# Patient Record
Sex: FEMALE | Race: WHITE | NOT HISPANIC OR LATINO | Employment: FULL TIME | URBAN - METROPOLITAN AREA
[De-identification: names, ages, dates, MRNs, and addresses within clinical notes are randomized per-mention and may not be internally consistent; named-entity substitution may affect disease eponyms.]

---

## 2020-05-11 ENCOUNTER — CLINICAL SUPPORT (OUTPATIENT)
Dept: FAMILY MEDICINE CLINIC | Facility: CLINIC | Age: 55
End: 2020-05-11
Payer: COMMERCIAL

## 2020-05-11 DIAGNOSIS — Z23 NEED FOR VACCINATION: Primary | ICD-10-CM

## 2020-05-11 PROCEDURE — 90746 HEPB VACCINE 3 DOSE ADULT IM: CPT | Performed by: FAMILY MEDICINE

## 2020-05-11 PROCEDURE — 90471 IMMUNIZATION ADMIN: CPT | Performed by: FAMILY MEDICINE

## 2020-07-10 DIAGNOSIS — J45.41 MODERATE PERSISTENT ASTHMA WITH ACUTE EXACERBATION: Primary | ICD-10-CM

## 2020-07-10 RX ORDER — FLUTICASONE FUROATE AND VILANTEROL TRIFENATATE 100; 25 UG/1; UG/1
POWDER RESPIRATORY (INHALATION)
Qty: 180 EACH | Refills: 2 | Status: SHIPPED | OUTPATIENT
Start: 2020-07-10 | End: 2021-06-23 | Stop reason: ALTCHOICE

## 2020-09-09 ENCOUNTER — CLINICAL SUPPORT (OUTPATIENT)
Dept: FAMILY MEDICINE CLINIC | Facility: CLINIC | Age: 55
End: 2020-09-09

## 2020-09-09 DIAGNOSIS — Z01.84 IMMUNITY TO HEPATITIS B VIRUS DEMONSTRATED BY SEROLOGIC TEST: Primary | ICD-10-CM

## 2020-09-09 PROCEDURE — 99211 OFF/OP EST MAY X REQ PHY/QHP: CPT

## 2020-09-09 PROCEDURE — 90746 HEPB VACCINE 3 DOSE ADULT IM: CPT

## 2020-09-09 PROCEDURE — 90471 IMMUNIZATION ADMIN: CPT

## 2020-09-14 DIAGNOSIS — R39.81 FUNCTIONAL URINARY INCONTINENCE: Primary | ICD-10-CM

## 2020-09-16 DIAGNOSIS — E11.9 TYPE 2 DIABETES MELLITUS WITHOUT COMPLICATION, WITHOUT LONG-TERM CURRENT USE OF INSULIN (HCC): Primary | ICD-10-CM

## 2020-09-16 DIAGNOSIS — R39.81 FUNCTIONAL URINARY INCONTINENCE: ICD-10-CM

## 2020-09-16 DIAGNOSIS — J45.41 MODERATE PERSISTENT ASTHMA WITH ACUTE EXACERBATION: ICD-10-CM

## 2020-09-16 DIAGNOSIS — F41.9 ANXIETY: ICD-10-CM

## 2020-09-16 RX ORDER — OXYBUTYNIN CHLORIDE 5 MG/1
5 TABLET, EXTENDED RELEASE ORAL DAILY
Qty: 90 TABLET | Refills: 2 | Status: SHIPPED | OUTPATIENT
Start: 2020-09-16 | End: 2021-03-25 | Stop reason: SDUPTHER

## 2020-09-16 RX ORDER — ESCITALOPRAM OXALATE 5 MG/1
5 TABLET ORAL DAILY
Qty: 30 TABLET | Refills: 5 | Status: SHIPPED | OUTPATIENT
Start: 2020-09-16 | End: 2020-10-01 | Stop reason: SDUPTHER

## 2020-09-16 RX ORDER — METFORMIN HYDROCHLORIDE EXTENDED-RELEASE TABLETS 1000 MG/1
1000 TABLET, FILM COATED, EXTENDED RELEASE ORAL
Qty: 90 TABLET | Refills: 2 | Status: SHIPPED | OUTPATIENT
Start: 2020-09-16 | End: 2020-09-16 | Stop reason: SDUPTHER

## 2020-09-16 RX ORDER — BUDESONIDE 0.5 MG/2ML
0.5 INHALANT ORAL 2 TIMES DAILY
Qty: 120 ML | Refills: 3 | Status: SHIPPED | OUTPATIENT
Start: 2020-09-16 | End: 2020-09-16 | Stop reason: SDUPTHER

## 2020-09-16 RX ORDER — BUDESONIDE 0.5 MG/2ML
0.5 INHALANT ORAL 2 TIMES DAILY
Qty: 120 ML | Refills: 3 | Status: SHIPPED | OUTPATIENT
Start: 2020-09-16 | End: 2021-08-11 | Stop reason: ALTCHOICE

## 2020-09-16 RX ORDER — METFORMIN HYDROCHLORIDE EXTENDED-RELEASE TABLETS 1000 MG/1
1000 TABLET, FILM COATED, EXTENDED RELEASE ORAL
Qty: 90 TABLET | Refills: 2 | Status: SHIPPED | OUTPATIENT
Start: 2020-09-16 | End: 2021-03-25 | Stop reason: SDUPTHER

## 2020-09-16 RX ORDER — OXYBUTYNIN CHLORIDE 5 MG/1
5 TABLET, EXTENDED RELEASE ORAL DAILY
Qty: 90 TABLET | Refills: 2 | Status: SHIPPED | OUTPATIENT
Start: 2020-09-16 | End: 2020-09-16 | Stop reason: SDUPTHER

## 2020-10-01 ENCOUNTER — OFFICE VISIT (OUTPATIENT)
Dept: FAMILY MEDICINE CLINIC | Facility: CLINIC | Age: 55
End: 2020-10-01

## 2020-10-01 VITALS
HEIGHT: 64 IN | SYSTOLIC BLOOD PRESSURE: 108 MMHG | OXYGEN SATURATION: 98 % | DIASTOLIC BLOOD PRESSURE: 74 MMHG | TEMPERATURE: 98.2 F | WEIGHT: 226 LBS | BODY MASS INDEX: 38.58 KG/M2 | RESPIRATION RATE: 16 BRPM | HEART RATE: 76 BPM

## 2020-10-01 DIAGNOSIS — F41.9 ANXIETY: ICD-10-CM

## 2020-10-01 DIAGNOSIS — E11.9 TYPE 2 DIABETES MELLITUS WITHOUT COMPLICATION, WITHOUT LONG-TERM CURRENT USE OF INSULIN (HCC): Primary | ICD-10-CM

## 2020-10-01 DIAGNOSIS — J45.41 MODERATE PERSISTENT ASTHMA WITH ACUTE EXACERBATION: ICD-10-CM

## 2020-10-01 DIAGNOSIS — R39.81 FUNCTIONAL URINARY INCONTINENCE: ICD-10-CM

## 2020-10-01 PROCEDURE — 99213 OFFICE O/P EST LOW 20 MIN: CPT | Performed by: FAMILY MEDICINE

## 2020-10-01 RX ORDER — ESCITALOPRAM OXALATE 10 MG/1
10 TABLET ORAL DAILY
Qty: 90 TABLET | Refills: 1 | Status: SHIPPED | OUTPATIENT
Start: 2020-10-01 | End: 2021-03-25 | Stop reason: SDUPTHER

## 2020-10-01 RX ORDER — IBUPROFEN 800 MG/1
TABLET ORAL EVERY 6 HOURS PRN
COMMUNITY

## 2020-10-01 RX ORDER — LANOLIN ALCOHOL/MO/W.PET/CERES
1 CREAM (GRAM) TOPICAL 3 TIMES DAILY
COMMUNITY
End: 2022-06-16

## 2020-10-01 RX ORDER — MELATONIN
1000 DAILY
COMMUNITY

## 2020-10-01 RX ORDER — FLUTICASONE PROPIONATE 50 MCG
2 SPRAY, SUSPENSION (ML) NASAL DAILY
COMMUNITY

## 2020-10-01 RX ORDER — LEVALBUTEROL INHALATION SOLUTION 0.31 MG/3ML
1 SOLUTION RESPIRATORY (INHALATION) EVERY 4 HOURS PRN
COMMUNITY

## 2020-10-01 RX ORDER — DIPHENOXYLATE HYDROCHLORIDE AND ATROPINE SULFATE 2.5; .025 MG/1; MG/1
1 TABLET ORAL DAILY
COMMUNITY
End: 2021-06-23 | Stop reason: ALTCHOICE

## 2020-10-01 RX ORDER — ACETAMINOPHEN 325 MG/1
650 TABLET ORAL EVERY 6 HOURS PRN
COMMUNITY

## 2020-10-01 RX ORDER — THIAMINE MONONITRATE (VIT B1) 100 MG
1000 TABLET ORAL DAILY
COMMUNITY
End: 2022-06-16

## 2020-10-13 DIAGNOSIS — Z79.4 TYPE 2 DIABETES MELLITUS WITHOUT COMPLICATION, WITH LONG-TERM CURRENT USE OF INSULIN (HCC): Primary | ICD-10-CM

## 2020-10-13 DIAGNOSIS — E11.9 TYPE 2 DIABETES MELLITUS WITHOUT COMPLICATION, WITH LONG-TERM CURRENT USE OF INSULIN (HCC): Primary | ICD-10-CM

## 2020-10-14 RX ORDER — LIRAGLUTIDE 6 MG/ML
INJECTION SUBCUTANEOUS
Qty: 27 PEN | Refills: 2 | Status: SHIPPED | OUTPATIENT
Start: 2020-10-14 | End: 2021-06-23 | Stop reason: ALTCHOICE

## 2021-02-24 ENCOUNTER — OFFICE VISIT (OUTPATIENT)
Dept: FAMILY MEDICINE CLINIC | Facility: CLINIC | Age: 56
End: 2021-02-24
Payer: COMMERCIAL

## 2021-02-24 VITALS
OXYGEN SATURATION: 97 % | TEMPERATURE: 97.2 F | SYSTOLIC BLOOD PRESSURE: 118 MMHG | HEART RATE: 65 BPM | WEIGHT: 217.8 LBS | DIASTOLIC BLOOD PRESSURE: 76 MMHG | RESPIRATION RATE: 14 BRPM | BODY MASS INDEX: 37.18 KG/M2 | HEIGHT: 64 IN

## 2021-02-24 DIAGNOSIS — E55.9 VITAMIN D DEFICIENCY: ICD-10-CM

## 2021-02-24 DIAGNOSIS — E11.9 TYPE 2 DIABETES MELLITUS WITHOUT COMPLICATION, WITHOUT LONG-TERM CURRENT USE OF INSULIN (HCC): Primary | ICD-10-CM

## 2021-02-24 DIAGNOSIS — E66.09 CLASS 2 OBESITY DUE TO EXCESS CALORIES WITHOUT SERIOUS COMORBIDITY WITH BODY MASS INDEX (BMI) OF 37.0 TO 37.9 IN ADULT: ICD-10-CM

## 2021-02-24 DIAGNOSIS — J45.41 MODERATE PERSISTENT ASTHMA WITH ACUTE EXACERBATION: ICD-10-CM

## 2021-02-24 DIAGNOSIS — E53.8 CYANOCOBALAMIN DEFICIENCY: ICD-10-CM

## 2021-02-24 DIAGNOSIS — R63.8 INCREASED BMI: ICD-10-CM

## 2021-02-24 DIAGNOSIS — R39.81 FUNCTIONAL URINARY INCONTINENCE: ICD-10-CM

## 2021-02-24 DIAGNOSIS — Z11.4 SCREENING FOR HIV (HUMAN IMMUNODEFICIENCY VIRUS): ICD-10-CM

## 2021-02-24 DIAGNOSIS — F41.9 ANXIETY: ICD-10-CM

## 2021-02-24 DIAGNOSIS — Z12.31 VISIT FOR SCREENING MAMMOGRAM: ICD-10-CM

## 2021-02-24 DIAGNOSIS — Z11.59 NEED FOR HEPATITIS C SCREENING TEST: ICD-10-CM

## 2021-02-24 PROBLEM — E66.812 CLASS 2 OBESITY DUE TO EXCESS CALORIES IN ADULT: Status: ACTIVE | Noted: 2021-02-24

## 2021-02-24 PROCEDURE — 99214 OFFICE O/P EST MOD 30 MIN: CPT | Performed by: FAMILY MEDICINE

## 2021-02-24 PROCEDURE — 3008F BODY MASS INDEX DOCD: CPT | Performed by: FAMILY MEDICINE

## 2021-02-24 PROCEDURE — 1036F TOBACCO NON-USER: CPT | Performed by: FAMILY MEDICINE

## 2021-02-24 RX ORDER — EPINEPHRINE 0.15 MG/.3ML
0.15 INJECTION INTRAMUSCULAR ONCE
COMMUNITY

## 2021-02-24 RX ORDER — LORATADINE 10 MG/1
CAPSULE, LIQUID FILLED ORAL
COMMUNITY

## 2021-02-24 NOTE — PROGRESS NOTES
BMI Counseling: Body mass index is 37 68 kg/m²  The BMI is above normal  Nutrition recommendations include decreasing portion sizes, encouraging healthy choices of fruits and vegetables, limiting drinks that contain sugar and reducing intake of cholesterol  Exercise recommendations include exercising 3-5 times per week  No pharmacotherapy was ordered  Assessment/Plan:  Order for mammogram   Order for blood work  Will check hemoglobin A1c and cholesterol since she was on keto diet  She had COVID vaccine done already  Will see her back in 3 months for Pap  Will plan to colonoscopy  Will monitor her left knee pain  Advised her knee brace  She can not do pneumonia vaccine due to reaction  I have spent 25 minutes with Patient  today in which greater than 50% of this time was spent in counseling/coordination of care regarding Risks and benefits of tx options           Problem List Items Addressed This Visit        Endocrine    Type 2 diabetes mellitus without complication, without long-term current use of insulin ([facility] [address] ) - Primary    Relevant Orders    CBC and differential    Hemoglobin A1C    Lipid Panel with Direct LDL reflex    Microalbumin,Urine    TSH, 3rd generation with Free T4 reflex    Uric acid       Respiratory    Moderate persistent asthma with acute exacerbation       Other    Functional urinary incontinence    Relevant Orders    UA w Reflex to Microscopic w Reflex to Culture    Anxiety    Class 2 obesity due to excess calories in adult      Other Visit Diagnoses     Vitamin D deficiency        Relevant Orders    Vitamin D 25 hydroxy    Cyanocobalamin deficiency        Relevant Orders    Vitamin B12    Visit for screening mammogram        Relevant Orders    Mammo screening bilateral w cad    Need for hepatitis C screening test        Relevant Orders    Hepatitis C Antibody ([facility])    Screening for HIV (human immunodeficiency virus)        Relevant Orders    HIV 1/2 Antigen/Antibody (4th Generation) w Reflex [facility]    Increased BMI                Subjective:      Patient ID: Garrett Flores is a 54 y o  female  63-year-old female follow-up type 2 diabetes asthma urinary incontinent  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   She working Friday Saturday Sunday and had the rest of the days off  She ran out Victoza and back on metformin daily  She is on Lexapro 10 mg daily from she has asthma has not needing Breo as much  She has not been able to exercise due to the weather  Her left knee started bothering her again only when she sleeps  Voltaren gel seem to help  She is due for complete physical in in March with Pap  The following portions of the patient's history were reviewed and updated as appropriate:   Past Medical History:  She has a past medical history of Anxiety (9/16/2020), Arthritis, Asthma, Class 2 obesity due to excess calories in adult (2/24/2021), Diabetes mellitus ([address] ), Functional urinary incontinence (9/14/2020), Hypertension, Moderate persistent asthma with acute exacerbation (9/16/2020), Obesity, Sleep apnea, Type 2 diabetes mellitus without complication, without long-term current use of insulin ([address] ) (9/16/2020), and Vitamin D deficiency  ,  _______________________________________________________________________  Medical Problems:  does not have any pertinent problems on file ,  _______________________________________________________________________  Past Surgical History:   has a past surgical history that includes Tonsillectomy; Colonoscopy (01/01/2015); Hysteroscopy (01/01/2008); Other surgical history (1998); Cyst Removal; and Mammo (historical) (01/04/2016)  ,  _______________________________________________________________________  Family History:  family history includes Arthritis in her brother, father, and mother; Cancer in her brother; Depression in her brother, father, and mother; Diabetes in her mother; Heart attack in her mother; Hyperlipidemia in her mother; Hypertension in her father and mother; Rheum arthritis in her father and mother ,  _______________________________________________________________________  Social History:   reports that she has never smoked  She has never used smokeless tobacco  She reports current alcohol use  She reports that she does not use drugs  ,  _______________________________________________________________________  Allergies:  is allergic to pneumococcal vaccines and other     _______________________________________________________________________  Current Outpatient Medications   Medication Sig Dispense Refill    ASPIRIN 81 PO Take 81 mg by mouth daily      b complex vitamins tablet Take 1 tablet by mouth daily      cholecalciferol (VITAMIN D3) 1,000 units tablet Take 2,000 Units by mouth daily      diphenhydrAMINE HCl (BENADRYL ALLERGY PO) Take by mouth      EPINEPHrine (EPIPEN JR) 0 15 mg/0 3 mL SOAJ Inject 0 15 mg into a muscle once      escitalopram (LEXAPRO) 10 mg tablet Take 1 tablet (10 mg total) by mouth daily 90 tablet 1    fluticasone (FLONASE) 50 mcg/act nasal spray 1 spray into each nostril daily      glucosamine-chondroitin 500-400 MG tablet Take 1 tablet by mouth 3 (three) times a day      ibuprofen (MOTRIN) 800 mg tablet Take by mouth every 6 (six) hours as needed for mild pain      Loratadine 10 MG CAPS Take by mouth      metFORMIN (FORTAMET) 1000 MG (OSM) 24 hr tablet Take 1 tablet (1,000 mg total) by mouth daily with breakfast 90 tablet 2    Omega-3 Fatty Acids (Fish Oil) 1200 MG CPDR Take 1,500 mg by mouth      oxybutynin (DITROPAN-XL) 5 mg 24 hr tablet Take 1 tablet (5 mg total) by mouth daily 90 tablet 2    acetaminophen (Tylenol) 325 mg tablet Take 650 mg by mouth every 6 (six) hours as needed for mild pain      BREO ELLIPTA 100-25 MCG/INH inhaler USE 1 INHALATION ORALLY    DAILY (Patient not taking: Reported on 10/1/2020) 180 each 2    budesonide (PULMICORT) 0 5 mg/2 mL nebulizer solution Take 1 vial (0 5 mg total) by nebulization 2 (two) times a day Rinse mouth after use  (Patient not taking: Reported on 2/24/2021) 120 mL 3    levalbuterol (XOPENEX) 0 31 mg/3 mL nebulizer solution Take 1 ampule by nebulization every 4 (four) hours as needed for wheezing      multivitamin (THERAGRAN) TABS Take 1 tablet by mouth daily      thiamine (VITAMIN B1) 100 mg tablet Take 1,000 mg by mouth daily      Victoza injection INJECT 1 8 MG ONCE DAILY FOR DIABETES= 90 DAYS SUPPLY (Patient not taking: Reported on 2/24/2021) 27 pen 2     No current facility-administered medications for this visit       _______________________________________________________________________  Review of Systems   Constitutional: Negative for activity change, appetite change, fatigue and unexpected weight change  HENT: Negative for ear pain, sore throat, trouble swallowing and voice change  Eyes: Negative for photophobia and visual disturbance  Respiratory: Negative for cough, chest tightness, shortness of breath and wheezing  Cardiovascular: Negative for chest pain, palpitations and leg swelling  Gastrointestinal: Negative for abdominal pain, constipation, diarrhea, nausea, rectal pain and vomiting  Endocrine: Negative for cold intolerance, polydipsia and polyuria  Genitourinary: Negative for difficulty urinating, dysuria, flank pain, menstrual problem and pelvic pain  Musculoskeletal: Negative for arthralgias, joint swelling and myalgias  Skin: Negative for color change and rash  Allergic/Immunologic: Negative for environmental allergies and immunocompromised state  Neurological: Negative for dizziness, weakness, numbness and headaches  Hematological: Negative for adenopathy  Does not bruise/bleed easily  Psychiatric/Behavioral: Negative for decreased concentration, dysphoric mood, self-injury, sleep disturbance and suicidal ideas  The patient is not nervous/anxious  Objective:  Vitals:    02/24/21 1207   BP: 118/76   BP Location: Left arm   Patient Position: Sitting   Cuff Size: Large   Pulse: 65   Resp: 14   Temp: (!) 97 2 °F (36 2 °C)   TempSrc: Temporal   SpO2: 97%   Weight: 98 8 kg (217 lb 12 8 oz)   Height: 5' 3 75" (1 619 m)     Body mass index is 37 68 kg/m²  Physical Exam  Vitals signs and nursing note reviewed  Constitutional:       Appearance: Normal appearance  She is obese  HENT:      Head: Normocephalic  Pulmonary:      Effort: Pulmonary effort is normal    Neurological:      Mental Status: She is alert and oriented to person, place, and time  Mental status is at baseline  Psychiatric:         Mood and Affect: Mood normal          Behavior: Behavior normal          Thought Content:  Thought content normal          Judgment: Judgment normal

## 2021-04-08 DIAGNOSIS — E11.9 TYPE 2 DIABETES MELLITUS WITHOUT COMPLICATION, WITHOUT LONG-TERM CURRENT USE OF INSULIN (HCC): Primary | ICD-10-CM

## 2021-04-08 RX ORDER — METFORMIN HYDROCHLORIDE 500 MG/1
1000 TABLET, EXTENDED RELEASE ORAL
Qty: 180 TABLET | Refills: 2 | Status: SHIPPED | OUTPATIENT
Start: 2021-04-08 | End: 2022-01-04

## 2021-06-15 ENCOUNTER — TELEPHONE (OUTPATIENT)
Dept: FAMILY MEDICINE CLINIC | Facility: CLINIC | Age: 56
End: 2021-06-15

## 2021-06-15 NOTE — TELEPHONE ENCOUNTER
Patient in [address] forgot c pap mask  ---she needs and order for c pap supplies faxed to  [phone]  Thank you

## 2021-06-17 ENCOUNTER — RA CDI HCC (OUTPATIENT)
Dept: OTHER | Facility: HOSPITAL | Age: 56
End: 2021-06-17

## 2021-06-17 NOTE — PROGRESS NOTES
[facility] [address]  coding opportunities             Chart reviewed, (number of) suggestions sent to provider: 1               Number of suggestions NOT actually used: 1     Patients insurance company: [employer] (Medicare Advantage and Commercial)     Visit status: Patient arrived for their scheduled appointment     Provider never responded to [employer]  coding request     [facility] [employer] [address]  coding opportunities             Chart reviewed, (number of) suggestions sent to provider: 1                  Patients insurance company: [employer] [address] [place] [address] (Medicare Advantage and Commercial)           E66 01: Morbid (severe) obesity due to excess calories ([facility] ) - please review if this is correct and assess and document if applicable

## 2021-06-22 ENCOUNTER — APPOINTMENT (OUTPATIENT)
Dept: LAB | Facility: CLINIC | Age: 56
End: 2021-06-22
Payer: COMMERCIAL

## 2021-06-22 DIAGNOSIS — E55.9 VITAMIN D DEFICIENCY: ICD-10-CM

## 2021-06-22 DIAGNOSIS — Z11.4 SCREENING FOR HIV (HUMAN IMMUNODEFICIENCY VIRUS): ICD-10-CM

## 2021-06-22 DIAGNOSIS — E53.8 CYANOCOBALAMIN DEFICIENCY: ICD-10-CM

## 2021-06-22 DIAGNOSIS — Z11.59 NEED FOR HEPATITIS C SCREENING TEST: ICD-10-CM

## 2021-06-22 DIAGNOSIS — E11.9 TYPE 2 DIABETES MELLITUS WITHOUT COMPLICATION, WITHOUT LONG-TERM CURRENT USE OF INSULIN (HCC): ICD-10-CM

## 2021-06-22 LAB
25(OH)D3 SERPL-MCNC: 30.2 NG/ML (ref 30–100)
BASOPHILS # BLD AUTO: 0.07 THOUSANDS/ΜL (ref 0–0.1)
BASOPHILS NFR BLD AUTO: 1 % (ref 0–1)
BILIRUB UR QL STRIP: NEGATIVE
CHOLEST SERPL-MCNC: 206 MG/DL (ref 50–200)
CLARITY UR: CLEAR
COLOR UR: YELLOW
EOSINOPHIL # BLD AUTO: 0.51 THOUSAND/ΜL (ref 0–0.61)
EOSINOPHIL NFR BLD AUTO: 8 % (ref 0–6)
ERYTHROCYTE [DISTWIDTH] IN BLOOD BY AUTOMATED COUNT: 12 % (ref 11.6–15.1)
EST. AVERAGE GLUCOSE BLD GHB EST-MCNC: 114 MG/DL
GLUCOSE UR STRIP-MCNC: NEGATIVE MG/DL
HBA1C MFR BLD: 5.6 %
HCT VFR BLD AUTO: 38.2 % (ref 34.8–46.1)
HCV AB SER QL: NORMAL
HDLC SERPL-MCNC: 54 MG/DL
HGB BLD-MCNC: 12.9 G/DL (ref 11.5–15.4)
HGB UR QL STRIP.AUTO: NEGATIVE
IMM GRANULOCYTES # BLD AUTO: 0.03 THOUSAND/UL (ref 0–0.2)
IMM GRANULOCYTES NFR BLD AUTO: 1 % (ref 0–2)
KETONES UR STRIP-MCNC: NEGATIVE MG/DL
LDLC SERPL CALC-MCNC: 121 MG/DL (ref 0–100)
LEUKOCYTE ESTERASE UR QL STRIP: NEGATIVE
LYMPHOCYTES # BLD AUTO: 1.82 THOUSANDS/ΜL (ref 0.6–4.47)
LYMPHOCYTES NFR BLD AUTO: 28 % (ref 14–44)
MCH RBC QN AUTO: 31.8 PG (ref 26.8–34.3)
MCHC RBC AUTO-ENTMCNC: 33.8 G/DL (ref 31.4–37.4)
MCV RBC AUTO: 94 FL (ref 82–98)
MONOCYTES # BLD AUTO: 0.42 THOUSAND/ΜL (ref 0.17–1.22)
MONOCYTES NFR BLD AUTO: 6 % (ref 4–12)
NEUTROPHILS # BLD AUTO: 3.76 THOUSANDS/ΜL (ref 1.85–7.62)
NEUTS SEG NFR BLD AUTO: 56 % (ref 43–75)
NITRITE UR QL STRIP: NEGATIVE
NRBC BLD AUTO-RTO: 0 /100 WBCS
PH UR STRIP.AUTO: 6 [PH]
PLATELET # BLD AUTO: 260 THOUSANDS/UL (ref 149–390)
PMV BLD AUTO: 11.3 FL (ref 8.9–12.7)
PROT UR STRIP-MCNC: NEGATIVE MG/DL
RBC # BLD AUTO: 4.06 MILLION/UL (ref 3.81–5.12)
SP GR UR STRIP.AUTO: 1.02 (ref 1–1.03)
TRIGL SERPL-MCNC: 153 MG/DL
TSH SERPL DL<=0.05 MIU/L-ACNC: 2.84 UIU/ML (ref 0.36–3.74)
URATE SERPL-MCNC: 6.2 MG/DL (ref 2–6.8)
UROBILINOGEN UR QL STRIP.AUTO: 0.2 E.U./DL
VIT B12 SERPL-MCNC: 770 PG/ML (ref 100–900)
WBC # BLD AUTO: 6.61 THOUSAND/UL (ref 4.31–10.16)

## 2021-06-22 PROCEDURE — 82306 VITAMIN D 25 HYDROXY: CPT

## 2021-06-22 PROCEDURE — 36415 COLL VENOUS BLD VENIPUNCTURE: CPT

## 2021-06-22 PROCEDURE — 82607 VITAMIN B-12: CPT

## 2021-06-22 PROCEDURE — 3044F HG A1C LEVEL LT 7.0%: CPT | Performed by: FAMILY MEDICINE

## 2021-06-22 PROCEDURE — 84443 ASSAY THYROID STIM HORMONE: CPT

## 2021-06-22 PROCEDURE — 87389 HIV-1 AG W/HIV-1&-2 AB AG IA: CPT

## 2021-06-22 PROCEDURE — 86803 HEPATITIS C AB TEST: CPT

## 2021-06-22 PROCEDURE — 84550 ASSAY OF BLOOD/URIC ACID: CPT

## 2021-06-22 PROCEDURE — 83036 HEMOGLOBIN GLYCOSYLATED A1C: CPT

## 2021-06-22 PROCEDURE — 85025 COMPLETE CBC W/AUTO DIFF WBC: CPT

## 2021-06-22 PROCEDURE — 80061 LIPID PANEL: CPT

## 2021-06-22 PROCEDURE — 81003 URINALYSIS AUTO W/O SCOPE: CPT | Performed by: FAMILY MEDICINE

## 2021-06-23 ENCOUNTER — OFFICE VISIT (OUTPATIENT)
Dept: FAMILY MEDICINE CLINIC | Facility: CLINIC | Age: 56
End: 2021-06-23
Payer: COMMERCIAL

## 2021-06-23 VITALS
HEART RATE: 78 BPM | BODY MASS INDEX: 37.66 KG/M2 | SYSTOLIC BLOOD PRESSURE: 124 MMHG | WEIGHT: 220.6 LBS | HEIGHT: 64 IN | OXYGEN SATURATION: 98 % | DIASTOLIC BLOOD PRESSURE: 84 MMHG | TEMPERATURE: 97.7 F

## 2021-06-23 DIAGNOSIS — J45.41 MODERATE PERSISTENT ASTHMA WITH ACUTE EXACERBATION: ICD-10-CM

## 2021-06-23 DIAGNOSIS — Z00.00 ANNUAL PHYSICAL EXAM: ICD-10-CM

## 2021-06-23 DIAGNOSIS — Z12.11 SCREENING FOR COLORECTAL CANCER: ICD-10-CM

## 2021-06-23 DIAGNOSIS — F41.9 ANXIETY: ICD-10-CM

## 2021-06-23 DIAGNOSIS — E11.9 TYPE 2 DIABETES MELLITUS WITHOUT COMPLICATION, WITHOUT LONG-TERM CURRENT USE OF INSULIN (HCC): Primary | ICD-10-CM

## 2021-06-23 DIAGNOSIS — E66.09 CLASS 2 OBESITY DUE TO EXCESS CALORIES WITHOUT SERIOUS COMORBIDITY WITH BODY MASS INDEX (BMI) OF 37.0 TO 37.9 IN ADULT: ICD-10-CM

## 2021-06-23 DIAGNOSIS — Z12.4 SCREENING FOR CERVICAL CANCER: ICD-10-CM

## 2021-06-23 DIAGNOSIS — Z12.12 SCREENING FOR COLORECTAL CANCER: ICD-10-CM

## 2021-06-23 DIAGNOSIS — Z12.11 ENCOUNTER FOR SCREENING COLONOSCOPY: ICD-10-CM

## 2021-06-23 DIAGNOSIS — R39.81 FUNCTIONAL URINARY INCONTINENCE: ICD-10-CM

## 2021-06-23 DIAGNOSIS — R63.8 INCREASED BMI: ICD-10-CM

## 2021-06-23 LAB — HIV 1+2 AB+HIV1 P24 AG SERPL QL IA: NORMAL

## 2021-06-23 PROCEDURE — 3008F BODY MASS INDEX DOCD: CPT | Performed by: FAMILY MEDICINE

## 2021-06-23 PROCEDURE — 87624 HPV HI-RISK TYP POOLED RSLT: CPT | Performed by: FAMILY MEDICINE

## 2021-06-23 PROCEDURE — 1036F TOBACCO NON-USER: CPT | Performed by: FAMILY MEDICINE

## 2021-06-23 PROCEDURE — G0145 SCR C/V CYTO,THINLAYER,RESCR: HCPCS | Performed by: FAMILY MEDICINE

## 2021-06-23 PROCEDURE — 99396 PREV VISIT EST AGE 40-64: CPT | Performed by: FAMILY MEDICINE

## 2021-06-23 PROCEDURE — 3725F SCREEN DEPRESSION PERFORMED: CPT | Performed by: FAMILY MEDICINE

## 2021-06-23 RX ORDER — ESCITALOPRAM OXALATE 10 MG/1
10 TABLET ORAL DAILY
Qty: 90 TABLET | Refills: 1 | Status: SHIPPED | OUTPATIENT
Start: 2021-06-23 | End: 2022-02-16 | Stop reason: SDUPTHER

## 2021-06-23 NOTE — PROGRESS NOTES
ADULT ANNUAL PHYSICAL  [facility] [place]    NAME: Radha Reyes  AGE: 64 y o  SEX: female  : 1965     DATE: 2021     Assessment and Plan:    discussed blood test results with patient  She has type 2 by diabetes on metformin 1000 mg daily  Hemoglobin A1c is 5 6   triglycerides 153 elevated  She is on fish oil 1500 milligram daily  Vitamin-D is low 30 she is on vitamin-D 2000 units once a day  B12 770 normal   Uric acid of 6 2  She is on Ditropan for urinary incontinent  Urine studies negative for infection  Mood is stable on Lexapro 10 milligram daily  She is in a great he new relationship  She is sexually active with no protection  Hep C was negative  HIV is pending  Last colonoscopy 5 years ago hyperplastic polyp  Will order for Cologuard study  Wait for Pap smear with HPV testing  Recommend to monitor her blood sugar her blood pressure and cholesterol  Supplement vitamin-D increase fish oil  See her back in 3 months follow-up her weight blood pressure  Problem List Items Addressed This Visit        Endocrine    Type 2 diabetes mellitus without complication, without long-term current use of insulin ([facility]) - Primary       Respiratory    Moderate persistent asthma with acute exacerbation       Other    Functional urinary incontinence    Anxiety    Relevant Medications    escitalopram (LEXAPRO) 10 mg tablet    Class 2 obesity due to excess calories in adult      Other Visit Diagnoses     Encounter for screening colonoscopy        Relevant Orders    Cologuard    Increased BMI        Annual physical exam        Screening for cervical cancer        Relevant Orders    Liquid-based pap, screening ([facility])    HPV High Risk ([facility])    Screening for colorectal cancer        Relevant Orders    Cologuard          Immunizations and preventive care screenings were discussed with patient today   Appropriate education was printed on patient's after visit summary  Counseling:  · Sexual health: discussed sexually transmitted diseases, partner selection, use of condoms, avoidance of unintended pregnancy, and contraceptive alternatives  Return in about 3 months (around 9/23/2021) for 30 min fu bp  Chief Complaint:     Chief Complaint   Patient presents with    Physical Exam     Review lab results     Gynecologic Exam      History of Present Illness:     Adult Annual Physical   Patient here for a comprehensive physical exam  The patient reports no problems  Diet and Physical Activity  · Diet/Nutrition: well balanced diet  · Exercise: walking  Depression Screening  PHQ-9 Depression Screening    PHQ-9:   Frequency of the following problems over the past two weeks:      Little interest or pleasure in doing things: 0 - not at all  Feeling down, depressed, or hopeless: 0 - not at all  PHQ-2 Score: 0       General Health  · Sleep: sleeps well  · Hearing: normal - bilateral   · Vision: no vision problems  · Dental: regular dental visits  /GYN Health  · Patient is: postmenopausal  · Last menstrual period: 5 y ago  · Contraceptive method: none  Review of Systems:     Review of Systems   Constitutional: Negative for activity change, appetite change, fatigue and unexpected weight change  HENT: Negative for ear pain, sore throat, trouble swallowing and voice change  Eyes: Negative for photophobia and visual disturbance  Respiratory: Negative for cough, chest tightness, shortness of breath and wheezing  Cardiovascular: Negative for chest pain, palpitations and leg swelling  Gastrointestinal: Negative for abdominal pain, constipation, diarrhea, nausea, rectal pain and vomiting  Endocrine: Negative for cold intolerance, polydipsia and polyuria  Genitourinary: Negative for difficulty urinating, dysuria, flank pain, menstrual problem and pelvic pain     Musculoskeletal: Negative for arthralgias, joint swelling and myalgias  Skin: Negative for color change and rash  Allergic/Immunologic: Negative for environmental allergies and immunocompromised state  Neurological: Negative for dizziness, weakness, numbness and headaches  Hematological: Negative for adenopathy  Does not bruise/bleed easily  Psychiatric/Behavioral: Negative for decreased concentration, dysphoric mood, self-injury, sleep disturbance and suicidal ideas  The patient is not nervous/anxious  Past Medical History:     Past Medical History:   Diagnosis Date    Anxiety 9/16/2020    Arthritis     Asthma     Class 2 obesity due to excess calories in adult 2/24/2021    Diabetes mellitus ([facility] [address] )     Functional urinary incontinence 9/14/2020    Hypertension     Moderate persistent asthma with acute exacerbation 9/16/2020    Obesity     Sleep apnea     Type 2 diabetes mellitus without complication, without long-term current use of insulin ([facility] [address] ) 9/16/2020    Vitamin D deficiency       Past Surgical History:     Past Surgical History:   Procedure Laterality Date    COLONOSCOPY  01/01/2015    tiny polyp=next 2020    CYST REMOVAL      thumb cyst    HYSTEROSCOPY  01/01/2008    with biopsy    MAMMO (HISTORICAL)  01/04/2016    normal=[facility]    OTHER SURGICAL HISTORY  1998    tongue papiloma    TONSILLECTOMY        Social History:     Social History     Socioeconomic History    Marital status:      Spouse name: None    Number of children: None    Years of education: None    Highest education level: None   Occupational History    None   Tobacco Use    Smoking status: Never Smoker    Smokeless tobacco: Never Used   Vaping Use    Vaping Use: Never used   Substance and Sexual Activity    Alcohol use: Yes     Alcohol/week: 0 0 standard drinks     Comment: Three to four per month hard cider      Drug use: Never    Sexual activity: Not Currently     Partners: Male     Birth control/protection: Post-menopausal Other Topics Concern    None   Social History Narrative    · Do you currently or have you served in the [employer]:   No      · Were you activated, into active duty, as a member of the [employer] or as a Reservist:   No      · Marital status:         · Exercise level: Moderate      · Diet:   Regular      · General stress level:   Low       · Caffeine intake:   Occasional        · Guns present in home:   No      · Seat belts used routinely:   Yes      · Sunscreen used routinely:   Yes      · Advance directive:   No         Per bianca      Social Determinants of Health     Financial Resource Strain:     Difficulty of Paying Living Expenses:    Food Insecurity:     Worried About Running Out of Food in the Last Year:     [address] in the Last Year:    Transportation Needs:     Lack of Transportation (Medical):      Lack of Transportation (Non-Medical):    Physical Activity:     Days of Exercise per Week:     Minutes of Exercise per Session:    Stress:     Feeling of Stress :    Social Connections:     Frequency of Communication with Friends and Family:     Frequency of Social Gatherings with Friends and Family:     Attends [employer] Services:     Active Member of Clubs or Organizations:     Attends Club or Organization Meetings:     Marital Status:    Intimate Partner Violence:     Fear of Current or Ex-Partner:     Emotionally Abused:     Physically Abused:     Sexually Abused:       Family History:     Family History   Problem Relation Age of Onset    Diabetes Mother     Hypertension Mother     Depression Mother     Arthritis Mother     Hyperlipidemia Mother     Heart attack Mother     Rheum arthritis Mother     Hypertension Father     Depression Father     Arthritis Father     Rheum arthritis Father     ADD / ADHD Father     Anxiety disorder Father     Depression Brother     Arthritis Brother     Cancer Brother         malignant neoplasm of lip, oral cavity, and pharynx    Drug abuse Brother     Substance Abuse Brother     Bipolar disorder Brother     Suicide Attempts Brother       Current Medications:     Current Outpatient Medications   Medication Sig Dispense Refill    acetaminophen (Tylenol) 325 mg tablet Take 650 mg by mouth every 6 (six) hours as needed for mild pain      ASPIRIN 81 PO Take 81 mg by mouth daily      b complex vitamins tablet Take 1 tablet by mouth daily      cholecalciferol (VITAMIN D3) 1,000 units tablet Take 2,000 Units by mouth daily      diphenhydrAMINE HCl (BENADRYL ALLERGY PO) Take by mouth      EPINEPHrine (EPIPEN JR) 0 15 mg/0 3 mL SOAJ Inject 0 15 mg into a muscle once      escitalopram (LEXAPRO) 10 mg tablet Take 1 tablet (10 mg total) by mouth daily 90 tablet 1    fluticasone (FLONASE) 50 mcg/act nasal spray 1 spray into each nostril daily      glucosamine-chondroitin 500-400 MG tablet Take 1 tablet by mouth 3 (three) times a day      ibuprofen (MOTRIN) 800 mg tablet Take by mouth every 6 (six) hours as needed for mild pain      levalbuterol (XOPENEX) 0 31 mg/3 mL nebulizer solution Take 1 ampule by nebulization every 4 (four) hours as needed for wheezing      Loratadine 10 MG CAPS Take by mouth      metFORMIN (GLUCOPHAGE-XR) 500 mg 24 hr tablet Take 2 tablets (1,000 mg total) by mouth daily with breakfast 180 tablet 2    Omega-3 Fatty Acids (Fish Oil) 1200 MG CPDR Take 1,500 mg by mouth      oxybutynin (DITROPAN-XL) 5 mg 24 hr tablet Take 1 tablet (5 mg total) by mouth daily 90 tablet 2    thiamine (VITAMIN B1) 100 mg tablet Take 1,000 mg by mouth daily      budesonide (PULMICORT) 0 5 mg/2 mL nebulizer solution Take 1 vial (0 5 mg total) by nebulization 2 (two) times a day Rinse mouth after use  (Patient not taking: Reported on 2/24/2021) 120 mL 3     No current facility-administered medications for this visit  Allergies:      Allergies   Allergen Reactions    Bee Venom Anaphylaxis, Hives, Shortness Of Breath, Throat Swelling and Wheezing    Pneumococcal Vaccines     Other Rash and Sneezing     Beech, birch (rash), maple tree pollen  Dog and cat danger  Physical Exam:     /84 (BP Location: Left arm, Patient Position: Sitting, Cuff Size: Standard)   Pulse 78   Temp 97 7 °F (36 5 °C) (Tympanic)   Ht 5' 3 75" (1 619 m)   Wt 100 kg (220 lb 9 6 oz)   SpO2 98%   BMI 38 16 kg/m²     Physical Exam  Vitals and nursing note reviewed  Constitutional:       General: She is not in acute distress  Appearance: She is well-developed  HENT:      Head: Normocephalic and atraumatic  Right Ear: Tympanic membrane, ear canal and external ear normal       Left Ear: Tympanic membrane, ear canal and external ear normal       Nose: Nose normal       Mouth/Throat:      Mouth: Mucous membranes are dry  Pharynx: Oropharynx is clear  Eyes:      Conjunctiva/sclera: Conjunctivae normal    Cardiovascular:      Rate and Rhythm: Normal rate and regular rhythm  Heart sounds: No murmur heard  Pulmonary:      Effort: Pulmonary effort is normal  No respiratory distress  Breath sounds: Normal breath sounds  Abdominal:      General: Abdomen is flat  Bowel sounds are normal       Palpations: Abdomen is soft  Tenderness: There is no abdominal tenderness  Hernia: There is no hernia in the left inguinal area or right inguinal area  Genitourinary:     General: Normal vulva  Exam position: Supine  Pubic Area: No rash  Kobe stage (genital): 5  Labia:         Right: No rash, tenderness, lesion or injury  Left: No rash, tenderness, lesion or injury  Urethra: No prolapse, urethral pain, urethral swelling or urethral lesion  Vagina: No vaginal discharge  Rectum: Normal       Comments: Pap done of cervix  Pt refused guaic testing/rectal exam  Musculoskeletal:         General: Normal range of motion        Cervical back: Normal range of motion and neck supple  Lymphadenopathy:      Lower Body: No right inguinal adenopathy  No left inguinal adenopathy  Skin:     General: Skin is warm and dry  Capillary Refill: Capillary refill takes less than 2 seconds  Neurological:      General: No focal deficit present  Mental Status: She is alert and oriented to person, place, and time  Mental status is at baseline  Psychiatric:         Mood and Affect: Mood normal          Behavior: Behavior normal          Thought Content:  Thought content normal          Judgment: Judgment normal           Emi Cazares MD  [facility]

## 2021-06-23 NOTE — PATIENT INSTRUCTIONS

## 2021-06-25 LAB
HPV HR 12 DNA CVX QL NAA+PROBE: NEGATIVE
HPV16 DNA CVX QL NAA+PROBE: NEGATIVE
HPV18 DNA CVX QL NAA+PROBE: NEGATIVE

## 2021-07-01 ENCOUNTER — TELEPHONE (OUTPATIENT)
Dept: FAMILY MEDICINE CLINIC | Facility: CLINIC | Age: 56
End: 2021-07-01

## 2021-07-01 LAB
LAB AP GYN PRIMARY INTERPRETATION: NORMAL
Lab: NORMAL

## 2021-07-01 NOTE — TELEPHONE ENCOUNTER
----- Message from Chan Blackwell MD sent at 7/1/2021 10:38 AM EDT -----  Please notify pt of normal pap test

## 2021-07-01 NOTE — TELEPHONE ENCOUNTER
Left a message for the patient to call the office back [facility]

## 2021-07-16 ENCOUNTER — TELEPHONE (OUTPATIENT)
Dept: FAMILY MEDICINE CLINIC | Facility: CLINIC | Age: 56
End: 2021-07-16

## 2021-08-11 ENCOUNTER — OFFICE VISIT (OUTPATIENT)
Dept: FAMILY MEDICINE CLINIC | Facility: CLINIC | Age: 56
End: 2021-08-11
Payer: COMMERCIAL

## 2021-08-11 VITALS
BODY MASS INDEX: 38.72 KG/M2 | HEIGHT: 64 IN | HEART RATE: 76 BPM | WEIGHT: 226.8 LBS | OXYGEN SATURATION: 97 % | TEMPERATURE: 97.5 F | SYSTOLIC BLOOD PRESSURE: 126 MMHG | DIASTOLIC BLOOD PRESSURE: 74 MMHG | RESPIRATION RATE: 16 BRPM

## 2021-08-11 DIAGNOSIS — R05.9 COUGH: ICD-10-CM

## 2021-08-11 DIAGNOSIS — J01.20 SUBACUTE ETHMOIDAL SINUSITIS: Primary | ICD-10-CM

## 2021-08-11 DIAGNOSIS — J45.41 MODERATE PERSISTENT ASTHMA WITH ACUTE EXACERBATION: ICD-10-CM

## 2021-08-11 DIAGNOSIS — J42 CHRONIC BRONCHITIS, UNSPECIFIED CHRONIC BRONCHITIS TYPE (HCC): ICD-10-CM

## 2021-08-11 DIAGNOSIS — E66.01 CLASS 2 SEVERE OBESITY DUE TO EXCESS CALORIES WITH SERIOUS COMORBIDITY AND BODY MASS INDEX (BMI) OF 39.0 TO 39.9 IN ADULT (HCC): ICD-10-CM

## 2021-08-11 PROCEDURE — U0005 INFEC AGEN DETEC AMPLI PROBE: HCPCS | Performed by: NURSE PRACTITIONER

## 2021-08-11 PROCEDURE — 99215 OFFICE O/P EST HI 40 MIN: CPT | Performed by: NURSE PRACTITIONER

## 2021-08-11 PROCEDURE — 1036F TOBACCO NON-USER: CPT | Performed by: NURSE PRACTITIONER

## 2021-08-11 PROCEDURE — 3074F SYST BP LT 130 MM HG: CPT | Performed by: NURSE PRACTITIONER

## 2021-08-11 PROCEDURE — U0003 INFECTIOUS AGENT DETECTION BY NUCLEIC ACID (DNA OR RNA); SEVERE ACUTE RESPIRATORY SYNDROME CORONAVIRUS 2 (SARS-COV-2) (CORONAVIRUS DISEASE [COVID-19]), AMPLIFIED PROBE TECHNIQUE, MAKING USE OF HIGH THROUGHPUT TECHNOLOGIES AS DESCRIBED BY CMS-2020-01-R: HCPCS | Performed by: NURSE PRACTITIONER

## 2021-08-11 PROCEDURE — 3008F BODY MASS INDEX DOCD: CPT | Performed by: NURSE PRACTITIONER

## 2021-08-11 PROCEDURE — 3078F DIAST BP <80 MM HG: CPT | Performed by: NURSE PRACTITIONER

## 2021-08-11 RX ORDER — PREDNISONE 20 MG/1
20 TABLET ORAL DAILY
Qty: 5 TABLET | Refills: 0 | Status: SHIPPED | OUTPATIENT
Start: 2021-08-11 | End: 2021-08-16

## 2021-08-11 RX ORDER — AZITHROMYCIN 250 MG/1
TABLET, FILM COATED ORAL
Qty: 6 TABLET | Refills: 0 | Status: SHIPPED | OUTPATIENT
Start: 2021-08-11 | End: 2021-08-16

## 2021-08-11 RX ORDER — FLUTICASONE FUROATE AND VILANTEROL TRIFENATATE 100; 25 UG/1; UG/1
1 POWDER RESPIRATORY (INHALATION) DAILY
Qty: 60 BLISTER | Refills: 0 | Status: SHIPPED | OUTPATIENT
Start: 2021-08-11 | End: 2022-06-16

## 2021-08-11 NOTE — PATIENT INSTRUCTIONS
Allergies   WHAT YOU NEED TO KNOW:   What are allergies? Allergies are an immune system reaction to a substance called an allergen  Your immune system sees the allergen as harmful and attacks it  What causes allergies? You may have allergies at certain times of the year or all year  The following are common allergies:  · Seasonal airborne allergies  happen during certain times of the year  This is also called hay fever  Tree, weed, or grass pollen are examples of allergens that you breathe in  · Environmental airborne allergy  triggers you may breathe in year-round include dust, mold, and pet hair  · Contact allergies  include latex, found in items such as condoms and medical gloves  Latex allergies can be very serious  · Insect sting allergies  may be caused by bees, hornets, fire ants, or other insects that sting or bite you  Insect allergies can be very serious  · Food allergies  commonly include shellfish, wheat, and eggs  Some foods must be eaten to produce an allergic reaction  Other foods can trigger a reaction if they touch your skin or are breathed in  What increases my risk for allergies? Allergic reactions can happen at any time, even if you have not had allergies before  You may develop an allergy after you have been exposed to an allergen more than once  Allergies are most common in children and elderly people, but anyone can have an allergic reaction  Your risk is also increased if you have a family history of allergies or a medical condition such as asthma  What are the signs and symptoms of allergies? · Mild symptoms  include sneezing and a runny, itchy, or stuffy nose  You may also have swollen, watery, or itchy eyes, or skin itching  You may have swelling or pain where an insect bit or stung you  · Anaphylaxis symptoms  include trouble breathing or swallowing, a rash or hives, or severe swelling  You may also have a cough, wheezing, or feel lightheaded or dizzy  Anaphylaxis is a sudden, life-threatening reaction that needs immediate treatment  How are allergies diagnosed? Your healthcare provider will ask about your signs and symptoms  He or she will ask what allergens you have been exposed to and if you have ever had other allergic reactions  He or she may look in your nose, ears, or throat  You may need additional testing if you developed anaphylaxis after you were exposed to a trigger and then exercised  This is called exercise-induced anaphylaxis  You may also need the following tests:  · Blood tests  are used to check for signs of a reaction to allergens  · Nasal tests  are used to see how your nasal passages react to allergens  A sample of your nasal fluid may also be tested  · Skin tests  can help your healthcare provider find what you are allergic to  He will place a small amount of allergen on your arm or back and then prick your skin with a needle  He will watch how your skin reacts to the allergen  How are allergies treated? · Antihistamines  help decrease itching, sneezing, and swelling  You may take them as a pill or use drops in your nose or eyes  · Decongestants  help your nose feel less stuffy  · Steroids  decrease swelling and redness  · Topical treatments  help decrease itching or swelling  You also may be given nasal sprays or eyedrops  · Epinephrine  is medicine used to treat severe allergic reactions such as anaphylaxis  · Desensitization  gets your body used to allergens you cannot avoid  Your healthcare provider will give you a shot that contains a small amount of an allergen  He or she will treat any allergic reaction you have  Your provider will give you more of the allergen a little at a time until your body gets used to it  Your reaction to the allergen may be less serious after this treatment  Your healthcare provider will tell you how long to get the shots      What steps do I need to take for signs or symptoms of anaphylaxis? · Immediately  give 1 shot of epinephrine only into the outer thigh muscle  · Leave the shot in place  as directed  Your healthcare provider may recommend you leave it in place for up to 10 seconds before you remove it  This helps make sure all of the epinephrine is delivered  · Call 911 and go to the emergency department,  even if the shot improved symptoms  Do not drive yourself  Bring the used epinephrine shot with you  What safety precautions do I need to take if I am at risk for anaphylaxis? · Keep 2 shots of epinephrine with you at all times  You may need a second shot, because epinephrine only works for about 20 minutes and symptoms may return  Your healthcare provider can show you and family members how to give the shot  Check the expiration date every month and replace it before it expires  · Create an action plan  Your healthcare provider can help you create a written plan that explains the allergy and an emergency plan to treat a reaction  The plan explains when to give a second epinephrine shot if symptoms return or do not improve after the first  Give copies of the action plan and emergency instructions to family members and work staff  Show them how to give a shot of epinephrine  · Be careful when you exercise  If you have had exercise-induced anaphylaxis, do not exercise right after you eat  Stop exercising right away if you start to develop any signs or symptoms of anaphylaxis  You may first feel tired, warm, or have itchy skin  Hives, swelling, and severe breathing problems may develop if you continue to exercise  · Carry medical alert identification  Wear medical alert jewelry or carry a card that explains the allergy  Ask your healthcare provider where to get these items  · Inform all healthcare providers of the allergy  This includes dentists, nurses, doctors, and surgeons  How can I manage allergies? · Use nasal rinses as directed    Rinse with a saline solution daily  This will help clear allergens out of your nose  Use distilled water if possible  You can also boil tap water and let it cool before you use it  Do not use tap water that has not been boiled  · Do not smoke  Allergy symptoms may decrease if you are not around smoke  Nicotine and other chemicals in cigarettes and cigars can cause lung damage  Ask your healthcare provider for information if you currently smoke and need help to quit  E-cigarettes or smokeless tobacco still contain nicotine  Talk to your healthcare provider before you use these products  How can I prevent an allergic reaction? · Do not go outside when pollen counts are high if you have seasonal allergies  Your symptoms may be better if you go outside only in the morning or evening  Use your air conditioner, and change air filters often  · Avoid dust, fur, and mold  Dust and vacuum your home often  You may want to wear a mask when you vacuum  Keep pets in certain rooms, and bathe them often  Use a dehumidifier (machine that decreases moisture) to help prevent mold  · Do not use products that contain latex if you have a latex allergy  Use nonlatex gloves if you work in healthcare or in food preparation  Always tell healthcare providers about a latex allergy  · Avoid areas that attract insects if you have an insect bite or sting allergy  Areas include trash cans, gardens, and picnics  Do not wear bright clothing or strong scents when you will be outside  · Prevent an allergic reaction caused by food  You may have a reaction if your food is not prepared safely  For example, you could be served food that touched your trigger food during preparation  This is called cross-contamination  Kitchen tools can also cause cross-contamination  You may also eat baked foods that contain a trigger food you do not know about  Ask if the food contains your trigger food before you handle or eat it      Call 258 for signs or symptoms of anaphylaxis,  such as trouble breathing, swelling in your mouth or throat, or wheezing  You may also have itching, a rash, hives, or feel like you are going to faint  When should I seek immediate care? · You have tingling in your hands or feet  · Your skin is red or flushed  When should I contact my healthcare provider? · You have questions or concerns about your condition or care  CARE AGREEMENT:   You have the right to help plan your care  Learn about your health condition and how it may be treated  Discuss treatment options with your healthcare providers to decide what care you want to receive  You always have the right to refuse treatment  The above information is an  only  It is not intended as medical advice for individual conditions or treatments  Talk to your doctor, nurse or pharmacist before following any medical regimen to see if it is safe and effective for you  © Copyright [employer] 2021 Information is for End User's use only and may not be sold, redistributed or otherwise used for commercial purposes  All illustrations and images included in CareNotes® are the copyrighted property of [employer] A M [employer] Inc  or [employer]    Asthma   WHAT YOU NEED TO KNOW:   What is asthma? Asthma is a lung disease that makes breathing difficult  Chronic inflammation and reactions to triggers narrow the airways in the lungs  Asthma can become life-threatening if it is not managed  What is cough-variant asthma? Cough-variant asthma is a type of asthma that causes a dry cough that keeps coming back  A dry cough may be your only symptom, or you may also have chest tightness  These symptoms may be caused by exercise or exposure to odors, allergens, or respiratory tract infections  Cough-variant asthma is treated the same way as typical asthma  What are the signs and symptoms of asthma?    · Coughing    · Wheezing    · Shortness of breath    · Chest tightness    What may trigger an asthma attack? · A cold, the flu, or a sinus infection    · Exercise    · Weather changes, especially cold, dry air    · Smoking or secondhand smoke    · Fumes from chemicals, dust, air pollution, or other small particles in the air    · Pets, pollen, dust mites, or cockroaches    How is asthma diagnosed? Your healthcare provider will examine you and listen to your lungs  He or she will ask how often you have symptoms and what makes them worse  Tell him or her if you have trouble sleeping, exercising, or doing other activities because of shortness of breath  Your provider will ask about your allergies and past colds, and if anyone in your family has allergies or asthma  Tell your healthcare provider about medicines you take, including over-the-counter drugs and herbal supplements  You may need a chest x-ray to check for lung problems, or a lung function test  Lung function tests show how well you can breathe  How is asthma treated? · Medicines  may be used to decrease inflammation, open airways, and make it easier to breathe  Medicines may be inhaled, taken as a pill, or injected  Short-term medicines relieve your symptoms quickly  Long-term medicines are used to prevent future asthma attacks  Other medicines may be needed if your regular medicines are not able to prevent attacks  · Allergy testing  may find allergies that trigger an asthma attack  You may need allergy shots or medicine to control allergies that make your asthma worse  How can I manage my symptoms and prevent future attacks? · Follow your Asthma Action Plan (AAP)  This is a written plan that you and your healthcare provider create  It explains which medicine you need and when to change doses if necessary  It also explains how you can monitor symptoms and use a peak flow meter  The meter measures how well your lungs are working      · Manage other health conditions , such as allergies, acid reflux, and sleep apnea  · Identify and avoid triggers  These may include pets, dust mites, mold, and cockroaches  · Do not smoke or be around others who smoke  Nicotine and other chemicals in cigarettes and cigars can cause lung damage  Ask your healthcare provider for information if you currently smoke and need help to quit  E-cigarettes or smokeless tobacco still contain nicotine  Talk to your healthcare provider before you use these products  · Ask about the flu vaccine  The flu can make your asthma worse  You may need a yearly flu shot  Call your local emergency number (911 in the [address]) if:   · You have severe shortness of breath  · The skin around your neck and ribs pulls in with each breath  · Your peak flow numbers are in the red zone of your AAP  When should I seek immediate care? · You have shortness of breath, even after you take your short-term medicine as directed  · Your lips or nails turn blue or gray  When should I call my doctor? · You run out of medicine before your next refill is due  · Your symptoms get worse  · You need to take more medicine than usual to control your symptoms  · You have questions or concerns about your condition or care  CARE AGREEMENT:   You have the right to help plan your care  Learn about your health condition and how it may be treated  Discuss treatment options with your healthcare providers to decide what care you want to receive  You always have the right to refuse treatment  The above information is an  only  It is not intended as medical advice for individual conditions or treatments  Talk to your doctor, nurse or pharmacist before following any medical regimen to see if it is safe and effective for you  © Copyright [employer] 2021 Information is for End User's use only and may not be sold, redistributed or otherwise used for commercial purposes   All illustrations and images included in CareNotes® are the copyrighted property of [employer]  or [employer] [facility]  Acute Cough   WHAT YOU NEED TO KNOW:   What is an acute cough? An acute cough can last up to 3 weeks  Common causes of an acute cough include a cold, allergies, or a lung infection  How is the cause of an acute cough diagnosed? Your healthcare provider will examine you and listen to your lungs  Tell your healthcare provider if you cough up any mucus, or have a fever or shortness of breath  Also tell your provider what makes the cough better or worse  Depending on your symptoms, you may need a chest x-ray  A sample of mucus may be collected and tested for infection  How is an acute cough treated? An acute cough usually goes away on its own  Ask your healthcare provider about medicines you can take to decrease your cough  You may need medicine to stop the cough, decrease swelling in your airways, or help open your airways  Medicine may also be given to help you cough up mucus  If you have an infection caused by bacteria, you may need antibiotics  What can I do to manage my cough? · Do not smoke and stay away from others who smoke  Nicotine and other chemicals in cigarettes and cigars can cause lung damage and make your cough worse  Ask your healthcare provider for information if you currently smoke and need help to quit  E-cigarettes or smokeless tobacco still contain nicotine  Talk to your healthcare provider before you use these products  · Drink extra liquids as directed  Liquids will help thin and loosen mucus so you can cough it up  Liquids will also help prevent dehydration  Examples of good liquids to drink include water, fruit juice, and broth  Do not drink liquids that contain caffeine  Caffeine can increase your risk for dehydration  Ask your healthcare provider how much liquid to drink each day  · Rest as directed  Do not do activities that make your cough worse, such as exercise  · Use a humidifier or vaporizer    Use a cool mist humidifier or a vaporizer to increase air moisture in your home  This may make it easier for you to breathe and help decrease your cough  · Eat 2 to 5 mL of honey 2 times each day  Honey can help thin mucus and decrease your cough  · Use cough drops or lozenges  These can help decrease throat irritation and your cough  When should I seek immediate care? · You have trouble breathing or feel short of breath  · You cough up blood, or you see blood in your mucus  · You faint or feel weak or dizzy  · You have chest pain when you cough or take a deep breath  · You have new wheezing  When should I contact my healthcare provider? · You have a fever  · Your cough lasts longer than 4 weeks  · Your symptoms do not improve with treatment  · You have questions or concerns about your condition or care  CARE AGREEMENT:   You have the right to help plan your care  Learn about your health condition and how it may be treated  Discuss treatment options with your healthcare providers to decide what care you want to receive  You always have the right to refuse treatment  The above information is an  only  It is not intended as medical advice for individual conditions or treatments  Talk to your doctor, nurse or pharmacist before following any medical regimen to see if it is safe and effective for you  © Copyright [employer] 2021 Information is for End User's use only and may not be sold, redistributed or otherwise used for commercial purposes  All illustrations and images included in CareNotes® are the copyrighted property of [employer]  or [employer]  Sinusitis   WHAT YOU NEED TO KNOW:   What is sinusitis? Sinusitis is inflammation or infection of your sinuses  Sinusitis is most often caused by a virus  Acute sinusitis may last up to 12 weeks  Chronic sinusitis lasts longer than 12 weeks   Recurrent sinusitis means you have 4 or more infections in 1 year  What increases my risk for sinusitis? · Medical conditions, such as an upper respiratory infection, allergies, asthma, or cystic fibrosis    · Dental infections or procedures, such as gum infections, tooth decay, or a root canal    · Smoking or exposure to secondhand smoke    · Abnormal sinus structure, such as nasal growths, swollen tonsils, or a deviated septum    · A weak immune system, from diseases such as diabetes or HIV    What are the signs and symptoms of sinusitis? · Fever    · Pain, pressure, redness, or swelling around the forehead, cheeks, or eyes    · Thick yellow or green discharge from your nose    · Tenderness when you touch your face over your sinuses    · Dry cough that happens mostly at night or when you lie down    · Headache and face pain that is worse when you lean forward    · Tooth pain, or pain when you chew    How is sinusitis diagnosed? Your healthcare provider will examine you and ask about your symptoms  He or she may check inside your nose using a nasal speculum  You may need any of the following tests:  · A sample of mucus from your nose  may show what germ is causing your infection  · A CT or MRI of your head  may show the mucous lining of your sinuses  You may be given contrast liquid to help your sinuses show up better in the pictures  Tell your healthcare provider if you have ever had an allergic reaction to contrast liquid  Do not enter the MRI room with anything metal  Metal can cause serious injury  Tell your healthcare provider if you have any metal in or on your body  How is sinusitis treated? Your symptoms may go away on their own  Your healthcare provider may recommend watchful waiting for up to 10 days before starting antibiotics  You may need any of the following:  · Acetaminophen  decreases pain and fever  It is available without a doctor's order  Ask how much to take and how often to take it  Follow directions   Read the labels of all other medicines you are using to see if they also contain acetaminophen, or ask your doctor or pharmacist  Acetaminophen can cause liver damage if not taken correctly  Do not use more than 4 grams (4,000 milligrams) total of acetaminophen in one day  · NSAIDs , such as ibuprofen, help decrease swelling, pain, and fever  This medicine is available with or without a doctor's order  NSAIDs can cause stomach bleeding or kidney problems in certain people  If you take blood thinner medicine, always ask your healthcare provider if NSAIDs are safe for you  Always read the medicine label and follow directions  · Nasal steroid sprays  may help decrease inflammation in your nose and sinuses  · Decongestants  help reduce swelling and drain mucus in the nose and sinuses  They may help you breathe easier  · Antihistamines  help dry mucus in the nose and relieve sneezing  · Antibiotics  help treat or prevent a bacterial infection  How can I manage my symptoms? · Rinse your sinuses as directed  Use a sinus rinse device to rinse your nasal passages with a saline (salt water) solution or distilled water  Do not use tap water  This will help thin the mucus in your nose and rinse away pollen and dirt  It will also help reduce swelling so you can breathe normally  · Use a humidifier  to increase air moisture in your home  This may make it easier for you to breathe and help decrease your cough  · Sleep with your head elevated  Place an extra pillow under your head before you go to sleep to help your sinuses drain  · Drink liquids as directed  Ask your healthcare provider how much liquid to drink each day and which liquids are best for you  Liquids will thin the mucus in your nose and help it drain  Avoid drinks that contain alcohol or caffeine  · Do not smoke, and avoid secondhand smoke  Nicotine and other chemicals in cigarettes and cigars can make your symptoms worse   Ask your healthcare provider for information if you currently smoke and need help to quit  E-cigarettes or smokeless tobacco still contain nicotine  Talk to your healthcare provider before you use these products  How can I help prevent the spread of germs? · Wash your hands often with soap and water  Wash your hands after you use the bathroom, change a child's diaper, or sneeze  Wash your hands before you prepare or eat food  · Stay away from people who are sick  Some germs spread easily and quickly through contact  When should I seek immediate care? · You have trouble breathing or wheezing that is getting worse  · You have a stiff neck, a fever, or a bad headache  · You cannot open your eye  · Your eyeball bulges out or you cannot move your eye  · You are more sleepy than normal, or you notice changes in your ability to think, move, or talk  · You have swelling of your forehead or scalp  When should I call my doctor? · You have vision changes, such as double vision  · Your eye and eyelid are red, swollen, and painful  · Your symptoms do not improve or go away after 10 days  · You have nausea and are vomiting  · Your nose is bleeding  · You have questions or concerns about your condition or care  CARE AGREEMENT:   You have the right to help plan your care  Learn about your health condition and how it may be treated  Discuss treatment options with your healthcare providers to decide what care you want to receive  You always have the right to refuse treatment  The above information is an  only  It is not intended as medical advice for individual conditions or treatments  Talk to your doctor, nurse or pharmacist before following any medical regimen to see if it is safe and effective for you  © Copyright [employer] 2021 Information is for End User's use only and may not be sold, redistributed or otherwise used for commercial purposes   All illustrations and images included in [address] are the copyrighted property of A D A M , Inc  or [facility] Sheila Benz

## 2021-08-11 NOTE — PROGRESS NOTES
BMI Counseling: Body mass index is 39 24 kg/m²  The BMI is above normal  Nutrition recommendations include decreasing portion sizes, encouraging healthy choices of fruits and vegetables, decreasing fast food intake, consuming healthier snacks, limiting drinks that contain sugar, moderation in carbohydrate intake, increasing intake of lean protein, reducing intake of saturated and trans fat and reducing intake of cholesterol  Exercise recommendations include vigorous physical activity 75 minutes/week, exercising 3-5 times per week, obtaining a gym membership and strength training exercises  No pharmacotherapy was ordered  Depression Screening and Follow-up Plan: Clincally patient does not have depression  No treatment is required  Assessment/Plan:         Problem List Items Addressed This Visit        Respiratory    Moderate persistent asthma with acute exacerbation    Relevant Medications    azithromycin (Zithromax) 250 mg tablet    fluticasone-vilanterol (Breo Ellipta) 100-25 mcg/inh inhaler    predniSONE 20 mg tablet    Subacute ethmoidal sinusitis - Primary    Relevant Medications    azithromycin (Zithromax) 250 mg tablet    Chronic bronchitis, unspecified chronic bronchitis type (HCC)    Relevant Medications    fluticasone-vilanterol (Breo Ellipta) 100-25 mcg/inh inhaler       Other    Class 2 obesity due to excess calories in adult    Cough    Relevant Orders    Novel Coronavirus (COVID-19), PCR [facility] Collected in Office    XR chest pa & lateral            Subjective:      Patient ID: Raina Colbert is a 64 y o  female  Patient is a 64year old female present for a productive cough with burning in chest, sore throat since Monday 8/9/2021, sinus problem with post nasal drip and congestion  She also uses a cpap and cleans out routinely  She reports diarrhea with one small amount of diarrhea last night but has since resolved, she did indicate she ate [employer] yesterday   Intermittent wheezing patient states using albuterol HFA routinely 4 puffs this afternoon  The following portions of the patient's history were reviewed and updated as appropriate:   Past Medical History:  She has a past medical history of Anxiety (9/16/2020), Arthritis, Asthma, Class 2 obesity due to excess calories in adult (2/24/2021), Diabetes mellitus ([facility] [address] ), Functional urinary incontinence (9/14/2020), Hypertension, Moderate persistent asthma with acute exacerbation (9/16/2020), Obesity, Sleep apnea, Type 2 diabetes mellitus without complication, without long-term current use of insulin ([facility] [address] ) (9/16/2020), and Vitamin D deficiency  ,  _______________________________________________________________________  Medical Problems:  does not have any pertinent problems on file ,  _______________________________________________________________________  Past Surgical History:   has a past surgical history that includes Tonsillectomy; Colonoscopy (01/01/2015); Hysteroscopy (01/01/2008); Other surgical history (1998); Cyst Removal; and Mammo (historical) (01/04/2016)  ,  _______________________________________________________________________  Family History:  family history includes ADD / ADHD in her father; Anxiety disorder in her father; Arthritis in her brother, father, and mother; Bipolar disorder in her brother; Cancer in her brother; Depression in her brother, father, and mother; Diabetes in her mother; Drug abuse in her brother; Heart attack in her mother; Hyperlipidemia in her mother; Hypertension in her father and mother; Rheum arthritis in her father and mother; Substance Abuse in her brother; Suicide Attempts in her brother ,  _______________________________________________________________________  Social History:   reports that she has never smoked  She has never used smokeless tobacco  She reports current alcohol use   She reports that she does not use drugs ,  _______________________________________________________________________  Allergies:  is allergic to bee venom, pneumococcal vaccines, and other     _______________________________________________________________________  Current Outpatient Medications   Medication Sig Dispense Refill    acetaminophen (Tylenol) 325 mg tablet Take 650 mg by mouth every 6 (six) hours as needed for mild pain      ASPIRIN 81 PO Take 81 mg by mouth daily      b complex vitamins tablet Take 1 tablet by mouth daily      cholecalciferol (VITAMIN D3) 1,000 units tablet Take 1,000 Units by mouth daily Patient states 10,000 units daily      diphenhydrAMINE HCl (BENADRYL ALLERGY PO) Take by mouth      EPINEPHrine (EPIPEN JR) 0 15 mg/0 3 mL SOAJ Inject 0 15 mg into a muscle once      escitalopram (LEXAPRO) 10 mg tablet Take 1 tablet (10 mg total) by mouth daily 90 tablet 1    fluticasone (FLONASE) 50 mcg/act nasal spray 1 spray into each nostril daily      glucosamine-chondroitin 500-400 MG tablet Take 1 tablet by mouth 3 (three) times a day      ibuprofen (MOTRIN) 800 mg tablet Take by mouth every 6 (six) hours as needed for mild pain      levalbuterol (XOPENEX) 0 31 mg/3 mL nebulizer solution Take 1 ampule by nebulization every 4 (four) hours as needed for wheezing      Loratadine 10 MG CAPS Take by mouth      Omega-3 Fatty Acids (Fish Oil) 1200 MG CPDR Take 3,000 mg by mouth       thiamine (VITAMIN B1) 100 mg tablet Take 1,000 mg by mouth daily      azithromycin (Zithromax) 250 mg tablet Take 2 tablets (500 mg total) by mouth daily for 1 day, THEN 1 tablet (250 mg total) daily for 4 days  6 tablet 0    fluticasone-vilanterol (Breo Ellipta) 100-25 mcg/inh inhaler Inhale 1 puff daily Rinse mouth after use   60 blister 0    metFORMIN (GLUCOPHAGE-XR) 500 mg 24 hr tablet Take 2 tablets (1,000 mg total) by mouth daily with breakfast 180 tablet 2    oxybutynin (DITROPAN-XL) 5 mg 24 hr tablet Take 1 tablet (5 mg total) by mouth daily 90 tablet 2    predniSONE 20 mg tablet Take 1 tablet (20 mg total) by mouth daily for 5 days 5 tablet 0     No current facility-administered medications for this visit      _______________________________________________________________________  Review of Systems   Constitutional: Negative  HENT: Positive for congestion, postnasal drip and sneezing  Negative for sore throat and voice change  Eyes: Negative  Respiratory: Positive for cough, chest tightness, shortness of breath and wheezing  Cardiovascular: Negative for chest pain and palpitations  Gastrointestinal: Positive for abdominal distention and abdominal pain  Endocrine: Negative  Genitourinary: Negative  Musculoskeletal: Negative  Allergic/Immunologic: Negative  Neurological: Negative  Hematological: Negative  Psychiatric/Behavioral: Negative  Objective:  Vitals:    08/11/21 1342   BP: 126/74   Pulse: 76   Resp: 16   Temp: 97 5 °F (36 4 °C)   TempSrc: Tympanic   SpO2: 97%   Weight: 103 kg (226 lb 12 8 oz)   Height: 5' 3 75" (1 619 m)     Body mass index is 39 24 kg/m²  Physical Exam  Vitals and nursing note reviewed  Constitutional:       Appearance: Normal appearance  She is well-developed  She is obese  HENT:      Head: Normocephalic and atraumatic  Right Ear: Tympanic membrane, ear canal and external ear normal       Left Ear: Tympanic membrane, ear canal and external ear normal       Nose: Congestion and rhinorrhea present  Mouth/Throat:      Mouth: Mucous membranes are moist  Mucous membranes are pale  Tonsils: No tonsillar exudate or tonsillar abscesses  0 on the right  0 on the left  Eyes:      Extraocular Movements: Extraocular movements intact  Conjunctiva/sclera: Conjunctivae normal       Pupils: Pupils are equal, round, and reactive to light  Cardiovascular:      Rate and Rhythm: Normal rate and regular rhythm  Pulses: Normal pulses        Heart sounds: Normal heart sounds  No murmur heard  Pulmonary:      Effort: Pulmonary effort is normal       Breath sounds: Normal breath sounds  Comments: Intermittent cough with decreased breath sounds  Abdominal:      General: Bowel sounds are normal       Palpations: Abdomen is soft  Musculoskeletal:         General: Normal range of motion  Cervical back: Normal range of motion  Skin:     General: Skin is warm  Capillary Refill: Capillary refill takes less than 2 seconds  Neurological:      General: No focal deficit present  Mental Status: She is alert and oriented to person, place, and time     Psychiatric:         Mood and Affect: Mood normal          Behavior: Behavior normal

## 2021-08-12 ENCOUNTER — TELEPHONE (OUTPATIENT)
Dept: FAMILY MEDICINE CLINIC | Facility: CLINIC | Age: 56
End: 2021-08-12

## 2021-08-12 LAB — SARS-COV-2 RNA RESP QL NAA+PROBE: NEGATIVE

## 2021-08-12 NOTE — TELEPHONE ENCOUNTER
----- Message from Jean Claude Kumar sent at 8/12/2021  2:50 PM EDT -----  Please call patient and notify test results are normal

## 2021-08-20 ENCOUNTER — OCCMED (OUTPATIENT)
Dept: URGENT CARE | Facility: MEDICAL CENTER | Age: 56
End: 2021-08-20
Payer: OTHER MISCELLANEOUS

## 2021-08-20 ENCOUNTER — APPOINTMENT (OUTPATIENT)
Dept: RADIOLOGY | Facility: MEDICAL CENTER | Age: 56
End: 2021-08-20
Payer: OTHER MISCELLANEOUS

## 2021-08-20 DIAGNOSIS — S29.9XXA CHEST WALL INJURY, INITIAL ENCOUNTER: ICD-10-CM

## 2021-08-20 DIAGNOSIS — S29.9XXA CHEST WALL INJURY, INITIAL ENCOUNTER: Primary | ICD-10-CM

## 2021-08-20 PROCEDURE — 99283 EMERGENCY DEPT VISIT LOW MDM: CPT | Performed by: PHYSICIAN ASSISTANT

## 2021-08-20 PROCEDURE — G0382 LEV 3 HOSP TYPE B ED VISIT: HCPCS | Performed by: PHYSICIAN ASSISTANT

## 2021-08-20 PROCEDURE — 71101 X-RAY EXAM UNILAT RIBS/CHEST: CPT

## 2021-08-24 ENCOUNTER — HOSPITAL ENCOUNTER (EMERGENCY)
Facility: HOSPITAL | Age: 56
Discharge: HOME | End: 2021-08-24
Attending: EMERGENCY MEDICINE
Payer: OTHER MISCELLANEOUS

## 2021-08-24 ENCOUNTER — APPOINTMENT (EMERGENCY)
Dept: CT IMAGING | Facility: HOSPITAL | Age: 56
End: 2021-08-24
Payer: OTHER MISCELLANEOUS

## 2021-08-24 ENCOUNTER — OCCMED (OUTPATIENT)
Dept: URGENT CARE | Facility: MEDICAL CENTER | Age: 56
End: 2021-08-24
Payer: OTHER MISCELLANEOUS

## 2021-08-24 VITALS
BODY MASS INDEX: 39.36 KG/M2 | RESPIRATION RATE: 22 BRPM | HEART RATE: 64 BPM | SYSTOLIC BLOOD PRESSURE: 157 MMHG | DIASTOLIC BLOOD PRESSURE: 81 MMHG | WEIGHT: 227.51 LBS | TEMPERATURE: 98.9 F | OXYGEN SATURATION: 97 %

## 2021-08-24 DIAGNOSIS — R07.82 INTERCOSTAL PAIN: Primary | ICD-10-CM

## 2021-08-24 DIAGNOSIS — S20.211A CONTUSION OF RIB ON RIGHT SIDE, INITIAL ENCOUNTER: Primary | ICD-10-CM

## 2021-08-24 PROCEDURE — 99283 EMERGENCY DEPT VISIT LOW MDM: CPT

## 2021-08-24 PROCEDURE — 99213 OFFICE O/P EST LOW 20 MIN: CPT | Performed by: PHYSICIAN ASSISTANT

## 2021-08-24 PROCEDURE — 71250 CT THORAX DX C-: CPT

## 2021-08-24 PROCEDURE — 99284 EMERGENCY DEPT VISIT MOD MDM: CPT | Performed by: PHYSICIAN ASSISTANT

## 2021-08-24 RX ORDER — METHOCARBAMOL 500 MG/1
500 TABLET, FILM COATED ORAL 2 TIMES DAILY
Qty: 20 TABLET | Refills: 0 | Status: SHIPPED | OUTPATIENT
Start: 2021-08-24 | End: 2022-06-16

## 2021-08-24 RX ORDER — LIDOCAINE 50 MG/G
1 PATCH TOPICAL ONCE
Status: DISCONTINUED | OUTPATIENT
Start: 2021-08-24 | End: 2021-08-24 | Stop reason: HOSPADM

## 2021-08-24 RX ORDER — METHOCARBAMOL 500 MG/1
500 TABLET, FILM COATED ORAL ONCE
Status: COMPLETED | OUTPATIENT
Start: 2021-08-24 | End: 2021-08-24

## 2021-08-24 RX ADMIN — LIDOCAINE 1 PATCH: 50 PATCH CUTANEOUS at 19:05

## 2021-08-24 RX ADMIN — METHOCARBAMOL 500 MG: 500 TABLET ORAL at 19:07

## 2021-08-24 NOTE — ED ATTENDING ATTESTATION
8/24/2021  IGarret MD, saw and evaluated the patient  I have discussed the patient with the resident/non-physician practitioner and agree with the resident's/non-physician practitioner's findings, Plan of Care, and MDM as documented in the resident's/non-physician practitioner's note, except where noted  All available labs and Radiology studies were reviewed  I was present for key portions of any procedure(s) performed by the resident/non-physician practitioner and I was immediately available to provide assistance  At this point I agree with the current assessment done in the Emergency Department  I have conducted an independent evaluation of this patient a history and physical is as follows:  Patient with right posterior rib pain, struck rib cage at edge of sink, did not hit head, no LOC, worse with movement, no chest pain, no LOC, no N/V/D  Patient seen at [facility], sent for CT as Rib XR didn't show anything  Patient is alert, oriented, well-appearing, hemodynamically stable vital signs noted; Lungs clear to auscultation bilaterally; Cardiovascular normal heart sounds:  Normal, equal pulses bilaterally; Abdomen: soft, nontender, nondistended, bowel sounds present; Neuro: normal cranial nerve, motor and sensory exam, no focal deficits; no neck stiffness; Extremities: no calf swelling or tenderness, neurovascular intact distally  Impression: Chest trauma, rule out rib fracture, will get CT chest, lidocaine patch for analgesia  ED Course     CT scan does not show any acute abnormality  Stable for discharge, advised follow-up with of [facility]      Critical Care Time  Procedures

## 2021-08-24 NOTE — Clinical Note
Edmond Bolanos was seen and treated in our emergency department on 8/24/2021  Diagnosis:      Jing  is off the rest of the shift today  She may return on this date: 08/30/2021    Please excuse Edmond Bolanos from work until cleared by occupational medicine  If you have any questions or concerns, please don't hesitate to call        Ana Maria Mena PA-C    ______________________________           _______________          _______________  Hospital Representative                              Date                                Time

## 2021-08-25 ENCOUNTER — APPOINTMENT (OUTPATIENT)
Dept: URGENT CARE | Facility: MEDICAL CENTER | Age: 56
End: 2021-08-25
Payer: OTHER MISCELLANEOUS

## 2021-08-25 PROCEDURE — 99213 OFFICE O/P EST LOW 20 MIN: CPT | Performed by: PHYSICIAN ASSISTANT

## 2021-08-25 NOTE — DISCHARGE INSTRUCTIONS
I prescribed a medication for muscle relaxation; please take this medication as prescribed  Please to not operate heavy machinery or go to work on this medication  You may continue using Tylenol and Aleve for pain relief  Please see the back of the bottle for dosing instructions  I recommend picking up lidocaine patches over-the-counter; these medications stay on for 12 hours and then a removed for 12 hours  Please see the back of the box for instructions  Please follow-up with occupational medicine at your next appointment for further workman's compensation evaluation  Please return to the emergency department for any worsening symptoms including chest pain, shortness of breath, dizziness, lightheadedness, syncopal episodes, inability to ambulate, severe pain, unremitting pain, etc  Please follow-up with the family practice physician at your earliest convenience

## 2021-08-25 NOTE — ED PROVIDER NOTES
History  Chief Complaint   Patient presents with    Medical Problem     pt had a fall at work on friday landed right side on sink was evaluated xray showed no fracture pain getting worse was sent from [address] [place] [address] to ED for ct scan      This is a 59-year-old female presenting to the emergency department for right-sided rib pain after having a mechanical fall 4 days ago  The patient notes she was at work and she tried open the door and ended up stumbling backwards and striking her right-sided chest wall against a sink  The patient noted significant pain at that time  The patient was evaluated with occupational medicine through [facility] and had chest x-ray and rib series completed which did not show any rib fractures  The patient denies any head strike  The patient has had increasing pain since the initial strike  When sitting, the patient has no pain  When breathing in deep, the patient has pain 1/10  However, when the patient attempts to move or twist her torso, the patient has 10/10 pain, specifically when she is attempting use or core muscles  The patient has been using Tylenol and Advil without any relief  No cardiac chest pain or shortness of breath  No other injuries  No abdominal pain  No nausea, vomiting, diarrhea, or constipation  The patient does have some bruising to the lateral aspect of her right-sided ribcage  The patient denies other complaints at this time  History provided by:  Patient   used: No    Medical Problem  Location:  Right Lateral Ribs  Quality:  Painful  Severity:  Moderate  Onset quality:  Sudden  Duration:  4 days  Timing:  Intermittent  Progression:  Worsening  Chronicity:  New  Context:  S/P Injury  Relieved by:  Stillness  Worsened by:   Movement  Ineffective treatments:  NSAIDs and Tylenol  Associated symptoms: no abdominal pain, no chest pain, no cough, no diarrhea, no fatigue, no fever, no headaches, no loss of consciousness, no nausea, no rhinorrhea, no shortness of breath, no vomiting and no wheezing        Prior to Admission Medications   Prescriptions Last Dose Informant Patient Reported? Taking? ASPIRIN 81 PO   Yes Yes   Sig: Take 81 mg by mouth daily   EPINEPHrine (EPIPEN JR) 0 15 mg/0 3 mL SOAJ   Yes Yes   Sig: Inject 0 15 mg into a muscle once   Loratadine 10 MG CAPS   Yes No   Sig: Take by mouth   Omega-3 Fatty Acids (Fish Oil) 1200 MG CPDR   Yes Yes   Sig: Take 3,000 mg by mouth    acetaminophen (Tylenol) 325 mg tablet   Yes No   Sig: Take 650 mg by mouth every 6 (six) hours as needed for mild pain   b complex vitamins tablet   Yes Yes   Sig: Take 1 tablet by mouth daily   cholecalciferol (VITAMIN D3) 1,000 units tablet   Yes Yes   Sig: Take 1,000 Units by mouth daily Patient states 10,000 units daily   diphenhydrAMINE HCl (BENADRYL ALLERGY PO)   Yes Yes   Sig: Take by mouth   escitalopram (LEXAPRO) 10 mg tablet   No Yes   Sig: Take 1 tablet (10 mg total) by mouth daily   fluticasone (FLONASE) 50 mcg/act nasal spray   Yes Yes   Si spray into each nostril daily   fluticasone-vilanterol (Breo Ellipta) 100-25 mcg/inh inhaler   No Yes   Sig: Inhale 1 puff daily Rinse mouth after use     glucosamine-chondroitin 500-400 MG tablet   Yes Yes   Sig: Take 1 tablet by mouth 3 (three) times a day   ibuprofen (MOTRIN) 800 mg tablet   Yes Yes   Sig: Take by mouth every 6 (six) hours as needed for mild pain   levalbuterol (XOPENEX) 0 31 mg/3 mL nebulizer solution   Yes Yes   Sig: Take 1 ampule by nebulization every 4 (four) hours as needed for wheezing   metFORMIN (GLUCOPHAGE-XR) 500 mg 24 hr tablet   No No   Sig: Take 2 tablets (1,000 mg total) by mouth daily with breakfast   oxybutynin (DITROPAN-XL) 5 mg 24 hr tablet   No No   Sig: Take 1 tablet (5 mg total) by mouth daily   thiamine (VITAMIN B1) 100 mg tablet   Yes Yes   Sig: Take 1,000 mg by mouth daily      Facility-Administered Medications: None       Past Medical History:   Diagnosis Date    Anxiety 9/16/2020    Arthritis     Asthma     Class 2 obesity due to excess calories in adult 2/24/2021    Diabetes mellitus ([facility] [address] )     Functional urinary incontinence 9/14/2020    Hypertension     Moderate persistent asthma with acute exacerbation 9/16/2020    Obesity     Sleep apnea     Type 2 diabetes mellitus without complication, without long-term current use of insulin ([facility] [address] ) 9/16/2020    Vitamin D deficiency        Past Surgical History:   Procedure Laterality Date    COLONOSCOPY  01/01/2015    tiny polyp=next 2020    CYST REMOVAL      thumb cyst    HYSTEROSCOPY  01/01/2008    with biopsy    MAMMO (HISTORICAL)  01/04/2016    [facility]    OTHER SURGICAL HISTORY  1998    tongue papiloma    TONSILLECTOMY         Family History   Problem Relation Age of Onset    Diabetes Mother     Hypertension Mother     Depression Mother     Arthritis Mother     Hyperlipidemia Mother     Heart attack Mother     Rheum arthritis Mother     Hypertension Father     Depression Father     Arthritis Father     Rheum arthritis Father     ADD / ADHD Father     Anxiety disorder Father     Depression Brother     Arthritis Brother     Cancer Brother         malignant neoplasm of lip, oral cavity, and pharynx    Drug abuse Brother     Substance Abuse Brother     Bipolar disorder Brother     Suicide Attempts Brother      I have reviewed and agree with the history as documented  E-Cigarette/Vaping    E-Cigarette Use Never User      E-Cigarette/Vaping Substances     Social History     Tobacco Use    Smoking status: Never Smoker    Smokeless tobacco: Never Used   Vaping Use    Vaping Use: Never used   Substance Use Topics    Alcohol use: Yes     Alcohol/week: 0 0 standard drinks     Comment: Three to four per month hard cider   Drug use: Never       Review of Systems   Constitutional: Negative for chills, fatigue and fever  HENT: Negative for rhinorrhea      Eyes: Negative for visual disturbance  Respiratory: Negative for cough, chest tightness, shortness of breath and wheezing  Cardiovascular: Negative for chest pain and palpitations  Gastrointestinal: Negative for abdominal pain, constipation, diarrhea, nausea and vomiting  Musculoskeletal: Negative for arthralgias, back pain, neck pain and neck stiffness  + Chest Wall Pain   Skin: Positive for color change (Ecchymosis)  Negative for wound  Neurological: Negative for dizziness, seizures, loss of consciousness, syncope, weakness, light-headedness, numbness and headaches  Psychiatric/Behavioral: Negative for confusion  All other systems reviewed and are negative  Physical Exam  Physical Exam  Vitals and nursing note reviewed  Constitutional:       General: She is not in acute distress  Appearance: Normal appearance  She is obese  She is not ill-appearing, toxic-appearing or diaphoretic  HENT:      Head: Normocephalic and atraumatic  Nose: Nose normal       Mouth/Throat:      Mouth: Mucous membranes are moist    Eyes:      General: No scleral icterus  Right eye: No discharge  Left eye: No discharge  Extraocular Movements: Extraocular movements intact  Conjunctiva/sclera: Conjunctivae normal    Neck:      Comments: Atraumatic  Cardiovascular:      Rate and Rhythm: Normal rate and regular rhythm  Pulses: Normal pulses  Heart sounds: Normal heart sounds  No murmur heard  No friction rub  No gallop  Comments: 2+ radial and DP pulses bilaterally  Pulmonary:      Effort: Pulmonary effort is normal  No respiratory distress  Breath sounds: Normal breath sounds  No stridor  No wheezing, rhonchi or rales  Comments: Chest wall tenderness to palpation of the patient's right lateral ribs  There is ecchymosis noted to the patient's right lateral ribs  Chest:      Chest wall: Tenderness present     Abdominal:      Comments: Soft, nontender, nondistended, without organomegaly  No ecchymosis to abdomen or flanks   Musculoskeletal:         General: Normal range of motion  Right lower leg: No edema  Left lower leg: No edema  Skin:     General: Skin is warm and dry  Capillary Refill: Capillary refill takes less than 2 seconds  Coloration: Skin is not jaundiced or pale  Comments: Ecchymosis as listed above   Neurological:      General: No focal deficit present  Mental Status: She is alert and oriented to person, place, and time  Mental status is at baseline  Psychiatric:         Mood and Affect: Mood normal          Behavior: Behavior normal          Vital Signs  ED Triage Vitals   Temperature Pulse Respirations Blood Pressure SpO2   08/24/21 1716 08/24/21 1716 08/24/21 1718 08/24/21 1716 08/24/21 1716   98 9 °F (37 2 °C) 62 16 166/77 96 %      Temp Source Heart Rate Source Patient Position - Orthostatic VS BP Location FiO2 (%)   08/24/21 1716 08/24/21 1716 08/24/21 1716 08/24/21 1716 --   Oral Monitor Sitting Left arm       Pain Score       08/24/21 1716       8           Vitals:    08/24/21 1716 08/24/21 1909   BP: 166/77 157/81   Pulse: 62 64   Patient Position - Orthostatic VS: Sitting Sitting         Visual Acuity      ED Medications  Medications   lidocaine (LIDODERM) 5 % patch 1 patch (1 patch Topical Medication Applied 8/24/21 1905)   methocarbamol (ROBAXIN) tablet 500 mg (500 mg Oral Given 8/24/21 1907)       Diagnostic Studies  Results Reviewed     None                 CT chest without contrast   Final Result by Sumit Aldridge MD (08/24 2009)      No evidence of acute chest trauma or acute cardiopulmonary process  Workstation performed: [id number]                    Procedures  Procedures         ED Course                             SBIRT 20yo+      Most Recent Value   SBIRT (22 yo +)   In order to provide better care to our patients, we are screening all of our patients for alcohol and drug use   Would it be okay to ask you these screening questions? No Filed at: 08/24/2021 1453                    [facility]  Number of Diagnoses or Management Options  Contusion of rib on right side, initial encounter: new and requires workup  Diagnosis management comments: This is a 59-year-old female presenting to the emergency department for right-sided rib pain after striking the edge of a sink last Friday  The patient has been evaluated at [address] and has had chest x-rays and rib series both without any acute rib fractures  The patient has been noting increasing pain over the weekend and since her injury  It is not difficulty moving go to work  She was sent here from Occupational Medicine for a CT scan  CT without contrast of chest negative for any acute fractures  The patient was given a lidocaine patch and Robaxin here  The patient is stable for discharge at this time  Recommended patient  lidocaine patches over-the-counter  I prescribed the patient Robaxin for outpatient as she noted significant relief with this medication  I also recommended the patient take Tylenol and ibuprofen related products  Strict return precautions given including chest pain, shortness of breath, dizziness, lightheadedness, inability to walk, severe pain, fever, unremitting pain, etc  Recommended patient follow-up with Occupational Medicine at her earliest convenience  The patient verifies her understanding and agrees the plan at this time  All questions answered to the patient's satisfaction         Amount and/or Complexity of Data Reviewed  Tests in the radiology section of CPT®: ordered and reviewed  Discuss the patient with other providers: yes (Dr Rey Smallwood)  Independent visualization of images, tracings, or specimens: yes    Patient Progress  Patient progress: stable      Disposition  Final diagnoses:   Contusion of rib on right side, initial encounter     Time reflects when diagnosis was documented in both MDM as applicable and the Disposition within this note     Time User Action Codes Description Comment    8/24/2021  8:23 PM Claudia Hewitt Add [S20 211A] Contusion of rib on right side, initial encounter       ED Disposition     ED Disposition Condition Date/Time Comment    Discharge Stable Tue Aug 24, 2021 [id number] [facility] discharge to home/self care              Follow-up Information     Follow up With Specialties Details Why Contact Info Additional Information    Emi Cazares MD Family Medicine Schedule an appointment as soon as possible for a visit   [facility],  [address] [place] [address] 79   87 [facility] Emergency Medicine Go to  If symptoms worsen [facility] [id number]  [address] [facility] Emergency Department, [facility] , [place], [place], [address]          Discharge Medication List as of 8/24/2021  8:27 PM      START taking these medications    Details   methocarbamol (ROBAXIN) 500 mg tablet Take 1 tablet (500 mg total) by mouth 2 (two) times a day, Starting Tue 8/24/2021, Normal         CONTINUE these medications which have NOT CHANGED    Details   ASPIRIN 81 PO Take 81 mg by mouth daily, Historical Med      b complex vitamins tablet Take 1 tablet by mouth daily, Historical Med      cholecalciferol (VITAMIN D3) 1,000 units tablet Take 1,000 Units by mouth daily Patient states 10,000 units daily, Historical Med      diphenhydrAMINE HCl (BENADRYL ALLERGY PO) Take by mouth, Historical Med      EPINEPHrine (EPIPEN JR) 0 15 mg/0 3 mL SOAJ Inject 0 15 mg into a muscle once, Historical Med      escitalopram (LEXAPRO) 10 mg tablet Take 1 tablet (10 mg total) by mouth daily, Starting Wed 6/23/2021, Until Tue 9/21/2021, Normal      fluticasone (FLONASE) 50 mcg/act nasal spray 1 spray into each nostril daily, Historical Med      fluticasone-vilanterol (Breo Ellipta) 100-25 mcg/inh inhaler Inhale 1 puff daily Rinse mouth after use , Starting Wed 8/11/2021, Until Fri 9/10/2021, Normal      glucosamine-chondroitin 500-400 MG tablet Take 1 tablet by mouth 3 (three) times a day, Historical Med      ibuprofen (MOTRIN) 800 mg tablet Take by mouth every 6 (six) hours as needed for mild pain, Historical Med      levalbuterol (XOPENEX) 0 31 mg/3 mL nebulizer solution Take 1 ampule by nebulization every 4 (four) hours as needed for wheezing, Historical Med      Omega-3 Fatty Acids (Fish Oil) 1200 MG CPDR Take 3,000 mg by mouth , Historical Med      thiamine (VITAMIN B1) 100 mg tablet Take 1,000 mg by mouth daily, Historical Med      acetaminophen (Tylenol) 325 mg tablet Take 650 mg by mouth every 6 (six) hours as needed for mild pain, Historical Med      Loratadine 10 MG CAPS Take by mouth, Historical Med      metFORMIN (GLUCOPHAGE-XR) 500 mg 24 hr tablet Take 2 tablets (1,000 mg total) by mouth daily with breakfast, Starting Thu 4/8/2021, Until Wed 7/7/2021, Normal      oxybutynin (DITROPAN-XL) 5 mg 24 hr tablet Take 1 tablet (5 mg total) by mouth daily, Starting Thu 3/25/2021, Until Wed 6/23/2021, Normal           No discharge procedures on file      PDMP Review     None          ED Provider  Electronically Signed by           Hank Samuel PA-C  08/24/21 1022

## 2021-08-27 ENCOUNTER — VBI (OUTPATIENT)
Dept: ADMINISTRATIVE | Facility: OTHER | Age: 56
End: 2021-08-27

## 2021-08-31 ENCOUNTER — APPOINTMENT (OUTPATIENT)
Dept: URGENT CARE | Facility: MEDICAL CENTER | Age: 56
End: 2021-08-31
Payer: OTHER MISCELLANEOUS

## 2021-08-31 PROCEDURE — 99213 OFFICE O/P EST LOW 20 MIN: CPT | Performed by: PHYSICIAN ASSISTANT

## 2021-09-08 ENCOUNTER — APPOINTMENT (OUTPATIENT)
Dept: URGENT CARE | Facility: MEDICAL CENTER | Age: 56
End: 2021-09-08
Payer: OTHER MISCELLANEOUS

## 2021-09-08 PROCEDURE — 99213 OFFICE O/P EST LOW 20 MIN: CPT | Performed by: PHYSICIAN ASSISTANT

## 2021-09-15 ENCOUNTER — RA CDI HCC (OUTPATIENT)
Dept: OTHER | Facility: HOSPITAL | Age: 56
End: 2021-09-15

## 2021-09-15 NOTE — PROGRESS NOTES
[facility]  coding opportunities       Chart reviewed, no opportunity found: CHART REVIEWED, NO OPPORTUNITY FOUND                        Patients insurance company: [employer] (Medicare Advantage and Commercial)

## 2021-09-22 ENCOUNTER — OFFICE VISIT (OUTPATIENT)
Dept: FAMILY MEDICINE CLINIC | Facility: CLINIC | Age: 56
End: 2021-09-22
Payer: COMMERCIAL

## 2021-09-22 VITALS
TEMPERATURE: 97.4 F | DIASTOLIC BLOOD PRESSURE: 86 MMHG | WEIGHT: 225 LBS | HEART RATE: 68 BPM | HEIGHT: 64 IN | SYSTOLIC BLOOD PRESSURE: 126 MMHG | OXYGEN SATURATION: 98 % | BODY MASS INDEX: 38.41 KG/M2

## 2021-09-22 DIAGNOSIS — R39.81 FUNCTIONAL URINARY INCONTINENCE: ICD-10-CM

## 2021-09-22 DIAGNOSIS — Z23 NEED FOR INFLUENZA VACCINATION: ICD-10-CM

## 2021-09-22 DIAGNOSIS — S20.211A CONTUSION OF RIBS, RIGHT, INITIAL ENCOUNTER: ICD-10-CM

## 2021-09-22 DIAGNOSIS — E66.01 CLASS 2 SEVERE OBESITY DUE TO EXCESS CALORIES WITH SERIOUS COMORBIDITY AND BODY MASS INDEX (BMI) OF 39.0 TO 39.9 IN ADULT (HCC): ICD-10-CM

## 2021-09-22 DIAGNOSIS — E55.9 VITAMIN D DEFICIENCY: ICD-10-CM

## 2021-09-22 DIAGNOSIS — E11.9 TYPE 2 DIABETES MELLITUS WITHOUT COMPLICATION, WITHOUT LONG-TERM CURRENT USE OF INSULIN (HCC): Primary | ICD-10-CM

## 2021-09-22 DIAGNOSIS — J45.41 MODERATE PERSISTENT ASTHMA WITH ACUTE EXACERBATION: ICD-10-CM

## 2021-09-22 DIAGNOSIS — E53.8 CYANOCOBALAMIN DEFICIENCY: ICD-10-CM

## 2021-09-22 DIAGNOSIS — Z23 NEED FOR SHINGLES VACCINE: ICD-10-CM

## 2021-09-22 PROCEDURE — 99214 OFFICE O/P EST MOD 30 MIN: CPT | Performed by: FAMILY MEDICINE

## 2021-09-22 PROCEDURE — 90750 HZV VACC RECOMBINANT IM: CPT

## 2021-09-22 PROCEDURE — 90472 IMMUNIZATION ADMIN EACH ADD: CPT

## 2021-09-22 PROCEDURE — 90682 RIV4 VACC RECOMBINANT DNA IM: CPT

## 2021-09-22 PROCEDURE — 90471 IMMUNIZATION ADMIN: CPT

## 2021-09-22 PROCEDURE — 3008F BODY MASS INDEX DOCD: CPT | Performed by: FAMILY MEDICINE

## 2021-09-22 PROCEDURE — 1036F TOBACCO NON-USER: CPT | Performed by: FAMILY MEDICINE

## 2021-09-22 PROCEDURE — 3074F SYST BP LT 130 MM HG: CPT | Performed by: FAMILY MEDICINE

## 2021-09-22 PROCEDURE — 3079F DIAST BP 80-89 MM HG: CPT | Performed by: FAMILY MEDICINE

## 2021-09-22 RX ORDER — OXYBUTYNIN CHLORIDE 10 MG/1
10 TABLET, EXTENDED RELEASE ORAL
Qty: 90 TABLET | Refills: 2 | Status: SHIPPED | OUTPATIENT
Start: 2021-09-22 | End: 2022-06-16 | Stop reason: SDUPTHER

## 2021-09-22 RX ORDER — OXYBUTYNIN CHLORIDE 10 MG/1
10 TABLET, EXTENDED RELEASE ORAL
Qty: 90 TABLET | Refills: 2 | Status: SHIPPED | OUTPATIENT
Start: 2021-09-22 | End: 2021-09-22 | Stop reason: SDUPTHER

## 2021-09-22 NOTE — PROGRESS NOTES
Assessment/Plan:    Increase Ditropan to 10 mg daily at bedtime continue metformin  Monitor her blood pressure  Flu in Shingrix vaccine 1  Given to her today  Follow-up in 3 months blood work and 2nd Shingrix vaccine  I have spent 25 minutes with Patient  today in which greater than 50% of this time was spent in counseling/coordination of care regarding Prognosis, Risks and benefits of tx options and Intructions for management  Problem List Items Addressed This Visit        Endocrine    Type 2 diabetes mellitus without complication, without long-term current use of insulin (HCC) - Primary    Relevant Orders    Hemoglobin A1C ([facility])    Microalbumin,Urine    CBC and differential    Comprehensive metabolic panel    Lipid Panel with Direct LDL reflex    Uric acid    UA w Reflex to Microscopic w Reflex to Culture    TSH, 3rd generation with Free T4 reflex       Respiratory    Moderate persistent asthma with acute exacerbation       Musculoskeletal and Integument    Contusion of ribs, right, initial encounter       Other    Functional urinary incontinence    Relevant Medications    oxybutynin (DITROPAN-XL) 10 MG 24 hr tablet    Class 2 obesity due to excess calories in adult    Relevant Orders    Lipid Panel with Direct LDL reflex    TSH, 3rd generation with Free T4 reflex      Other Visit Diagnoses     Cyanocobalamin deficiency        Relevant Orders    Vitamin B12    Vitamin D deficiency        Relevant Orders    Vitamin D 25 hydroxy    Need for influenza vaccination        Relevant Orders    influenza vaccine, quadrivalent, recombinant, PF, 0 5 mL, for patients 18 yr+ (FLUBLOK) (Completed)    Need for shingles vaccine        Relevant Orders    Zoster Vaccine Recombinant IM (Completed)            Subjective:      Patient ID: Hector Carter is a 64 y o  female  79-year-old female follow-up type 2 diabetes blood pressure asthma    Last month she fell at work and hurt her right ribs x-ray negative for fracture she has severe pain in 2 weeks  She is using ice pack Tylenol and Aleve with some improvement  She has not been able to exercise due to the pain  She is on metformin 1000 mg twice a day  She is not on anything for blood pressure  She has a exacerbation of her asthma last month resolve on antibiotic and prednisone  She works as a respiratory therapist   She is up-to-date with COVID vaccine  She has worsening urinary incontinent since her ribs was hurting her  She is on Ditropan 5 mg once a day  She is on Lexapro for mood  Breo for in asthma  The following portions of the patient's history were reviewed and updated as appropriate:   Past Medical History:  She has a past medical history of Anxiety (9/16/2020), Arthritis, Asthma, Class 2 obesity due to excess calories in adult (2/24/2021), Contusion of ribs, right, initial encounter (9/22/2021), Diabetes mellitus ([facility] [address] ), Functional urinary incontinence (9/14/2020), Hypertension, Moderate persistent asthma with acute exacerbation (9/16/2020), Obesity, Sleep apnea, Type 2 diabetes mellitus without complication, without long-term current use of insulin ([facility] [address] ) (9/16/2020), and Vitamin D deficiency  ,  _______________________________________________________________________  Medical Problems:  does not have any pertinent problems on file ,  _______________________________________________________________________  Past Surgical History:   has a past surgical history that includes Tonsillectomy; Colonoscopy (01/01/2015); Hysteroscopy (01/01/2008); Other surgical history (1998); Cyst Removal; and Mammo (historical) (01/04/2016)  ,  _______________________________________________________________________  Family History:  family history includes ADD / ADHD in her father;  Anxiety disorder in her father; Arthritis in her brother, father, and mother; Bipolar disorder in her brother; Cancer in her brother; Depression in her brother, father, and mother; Diabetes in her mother; Drug abuse in her brother; Heart attack in her mother; Hyperlipidemia in her mother; Hypertension in her father and mother; Rheum arthritis in her father and mother; Substance Abuse in her brother; Suicide Attempts in her brother ,  _______________________________________________________________________  Social History:   reports that she has never smoked  She has never used smokeless tobacco  She reports current alcohol use  She reports that she does not use drugs  ,  _______________________________________________________________________  Allergies:  is allergic to bee venom, pneumococcal vaccines, and other     _______________________________________________________________________  Current Outpatient Medications   Medication Sig Dispense Refill    acetaminophen (Tylenol) 325 mg tablet Take 650 mg by mouth every 6 (six) hours as needed for mild pain      ASPIRIN 81 PO Take 81 mg by mouth daily      b complex vitamins tablet Take 1 tablet by mouth daily      cholecalciferol (VITAMIN D3) 1,000 units tablet Take 1,000 Units by mouth daily Patient states 10,000 units daily      diphenhydrAMINE HCl (BENADRYL ALLERGY PO) Take by mouth      EPINEPHrine (EPIPEN JR) 0 15 mg/0 3 mL SOAJ Inject 0 15 mg into a muscle once      escitalopram (LEXAPRO) 10 mg tablet Take 1 tablet (10 mg total) by mouth daily 90 tablet 1    fluticasone (FLONASE) 50 mcg/act nasal spray 1 spray into each nostril daily      glucosamine-chondroitin 500-400 MG tablet Take 1 tablet by mouth 3 (three) times a day      ibuprofen (MOTRIN) 800 mg tablet Take by mouth every 6 (six) hours as needed for mild pain      levalbuterol (XOPENEX) 0 31 mg/3 mL nebulizer solution Take 1 ampule by nebulization every 4 (four) hours as needed for wheezing      Loratadine 10 MG CAPS Take by mouth      metFORMIN (GLUCOPHAGE-XR) 500 mg 24 hr tablet Take 2 tablets (1,000 mg total) by mouth daily with breakfast 180 tablet 2    Omega-3 Fatty Acids (Fish Oil) 1200 MG CPDR Take 3,000 mg by mouth       oxybutynin (DITROPAN-XL) 10 MG 24 hr tablet Take 1 tablet (10 mg total) by mouth daily at bedtime 90 tablet 2    thiamine (VITAMIN B1) 100 mg tablet Take 1,000 mg by mouth daily      fluticasone-vilanterol (Breo Ellipta) 100-25 mcg/inh inhaler Inhale 1 puff daily Rinse mouth after use  60 blister 0    methocarbamol (ROBAXIN) 500 mg tablet Take 1 tablet (500 mg total) by mouth 2 (two) times a day (Patient not taking: Reported on 9/22/2021) 20 tablet 0     No current facility-administered medications for this visit      _______________________________________________________________________  Review of Systems   Constitutional: Negative for activity change, appetite change, fatigue and unexpected weight change  HENT: Negative for ear pain, sore throat, trouble swallowing and voice change  Eyes: Negative for photophobia and visual disturbance  Respiratory: Negative for cough, chest tightness, shortness of breath and wheezing  Cardiovascular: Negative for chest pain, palpitations and leg swelling  Gastrointestinal: Negative for abdominal pain, constipation, diarrhea, nausea, rectal pain and vomiting  Endocrine: Negative for cold intolerance, polydipsia and polyuria  Genitourinary: Positive for frequency  Negative for difficulty urinating, dysuria, flank pain, menstrual problem and pelvic pain  Musculoskeletal: Positive for arthralgias  Negative for joint swelling and myalgias  Skin: Negative for color change and rash  Allergic/Immunologic: Negative for environmental allergies and immunocompromised state  Neurological: Negative for dizziness, weakness, numbness and headaches  Hematological: Negative for adenopathy  Does not bruise/bleed easily  Psychiatric/Behavioral: Negative for decreased concentration, dysphoric mood, self-injury, sleep disturbance and suicidal ideas  The patient is not nervous/anxious  Objective:  Vitals:    09/22/21 0911   BP: 126/86   BP Location: Left arm   Patient Position: Sitting   Cuff Size: Large   Pulse: 68   Temp: (!) 97 4 °F (36 3 °C)   TempSrc: Tympanic   SpO2: 98%   Weight: 102 kg (225 lb)   Height: 5' 3 75" (1 619 m)     Body mass index is 38 92 kg/m²  Physical Exam  Vitals and nursing note reviewed  Constitutional:       Appearance: She is well-developed  She is obese  HENT:      Head: Normocephalic and atraumatic  Right Ear: Tympanic membrane, ear canal and external ear normal       Left Ear: Tympanic membrane, ear canal and external ear normal       Nose: Nose normal       Mouth/Throat:      Pharynx: No oropharyngeal exudate  Eyes:      Pupils: Pupils are equal, round, and reactive to light  Neck:      Thyroid: No thyromegaly  Cardiovascular:      Rate and Rhythm: Normal rate and regular rhythm  Pulses: no weak pulses          Dorsalis pedis pulses are 2+ on the right side and 2+ on the left side  Posterior tibial pulses are 1+ on the right side and 1+ on the left side  Heart sounds: Normal heart sounds  No murmur heard  Pulmonary:      Effort: Pulmonary effort is normal  No respiratory distress  Breath sounds: Normal breath sounds  No wheezing or rales  Abdominal:      General: Bowel sounds are normal  There is no distension  Palpations: Abdomen is soft  Tenderness: There is no abdominal tenderness  There is no guarding  Genitourinary:     Vagina: Normal    Musculoskeletal:         General: Swelling present  No tenderness  Normal range of motion  Cervical back: Normal range of motion and neck supple  Comments: Hematoma noted on the right ribs tender on palpation no erythema no warmth   Feet:      Right foot:      Skin integrity: No ulcer, skin breakdown, erythema, warmth, callus or dry skin  Left foot:      Skin integrity: No ulcer, skin breakdown, erythema, warmth, callus or dry skin     Skin: General: Skin is warm and dry  Findings: No rash  Neurological:      General: No focal deficit present  Mental Status: She is alert and oriented to person, place, and time  Mental status is at baseline  Psychiatric:         Mood and Affect: Mood normal          Behavior: Behavior normal          Thought Content: Thought content normal          Judgment: Judgment normal        Patient's shoes and socks removed  Right Foot/Ankle   Right Foot Inspection  Skin Exam: skin normal and skin intact no dry skin, no warmth, no callus, no erythema, no maceration, no abnormal color, no pre-ulcer, no ulcer and no callus                          Toe Exam: ROM and strength within normal limits  Sensory   Vibration: intact  Proprioception: intact   Monofilament testing: intact  Vascular  Capillary refills: < 3 seconds  The right DP pulse is 2+  The right PT pulse is 1+  Right Toe  - Comprehensive Exam  Ecchymosis: none  Arch: normal  Hammertoes: absent  Claw Toes: absent  Swelling: none   Tenderness: none         Left Foot/Ankle  Left Foot Inspection  Skin Exam: skin normal and skin intactno dry skin, no warmth, no erythema, no maceration, normal color, no pre-ulcer, no ulcer and no callus                         Toe Exam: ROM and strength within normal limits                   Sensory   Vibration: intact  Proprioception: intact  Monofilament: intact  Vascular  Capillary refills: < 3 seconds  The left DP pulse is 2+  The left PT pulse is 1+  Left Toe  - Comprehensive Exam  Ecchymosis: none  Arch: normal  Hammertoes: absent  Claw toes: absent  Swelling: none   Tenderness: none       Assign Risk Category:  No deformity present; No loss of protective sensation;  No weak pulses       Risk: 0

## 2021-11-08 ENCOUNTER — VBI (OUTPATIENT)
Dept: ADMINISTRATIVE | Facility: OTHER | Age: 56
End: 2021-11-08

## 2022-01-04 ENCOUNTER — PATIENT MESSAGE (OUTPATIENT)
Dept: FAMILY MEDICINE CLINIC | Facility: CLINIC | Age: 57
End: 2022-01-04

## 2022-01-04 DIAGNOSIS — E11.9 TYPE 2 DIABETES MELLITUS WITHOUT COMPLICATION, WITHOUT LONG-TERM CURRENT USE OF INSULIN (HCC): ICD-10-CM

## 2022-01-04 RX ORDER — METFORMIN HYDROCHLORIDE 500 MG/1
1000 TABLET, EXTENDED RELEASE ORAL
Qty: 180 TABLET | Refills: 0 | Status: SHIPPED | OUTPATIENT
Start: 2022-01-04 | End: 2022-01-05 | Stop reason: SDUPTHER

## 2022-01-04 RX ORDER — METFORMIN HYDROCHLORIDE 500 MG/1
TABLET, EXTENDED RELEASE ORAL
Qty: 180 TABLET | Refills: 3 | Status: SHIPPED | OUTPATIENT
Start: 2022-01-04 | End: 2022-01-04 | Stop reason: SDUPTHER

## 2022-01-05 DIAGNOSIS — E11.9 TYPE 2 DIABETES MELLITUS WITHOUT COMPLICATION, WITHOUT LONG-TERM CURRENT USE OF INSULIN (HCC): ICD-10-CM

## 2022-01-05 RX ORDER — METFORMIN HYDROCHLORIDE 500 MG/1
1000 TABLET, EXTENDED RELEASE ORAL
Qty: 180 TABLET | Refills: 2 | Status: SHIPPED | OUTPATIENT
Start: 2022-01-05 | End: 2022-01-06 | Stop reason: SDUPTHER

## 2022-01-06 DIAGNOSIS — E11.9 TYPE 2 DIABETES MELLITUS WITHOUT COMPLICATION, WITHOUT LONG-TERM CURRENT USE OF INSULIN (HCC): ICD-10-CM

## 2022-01-06 RX ORDER — METFORMIN HYDROCHLORIDE 500 MG/1
1000 TABLET, EXTENDED RELEASE ORAL
Qty: 180 TABLET | Refills: 2 | Status: SHIPPED | OUTPATIENT
Start: 2022-01-06 | End: 2022-04-27 | Stop reason: SDUPTHER

## 2022-01-11 ENCOUNTER — VBI (OUTPATIENT)
Dept: ADMINISTRATIVE | Facility: OTHER | Age: 57
End: 2022-01-11

## 2022-02-16 DIAGNOSIS — F41.9 ANXIETY: ICD-10-CM

## 2022-02-16 RX ORDER — ESCITALOPRAM OXALATE 10 MG/1
10 TABLET ORAL DAILY
Qty: 90 TABLET | Refills: 0 | Status: SHIPPED | OUTPATIENT
Start: 2022-02-16 | End: 2022-06-16 | Stop reason: SDUPTHER

## 2022-04-27 DIAGNOSIS — E11.9 TYPE 2 DIABETES MELLITUS WITHOUT COMPLICATION, WITHOUT LONG-TERM CURRENT USE OF INSULIN (HCC): ICD-10-CM

## 2022-04-27 RX ORDER — METFORMIN HYDROCHLORIDE 500 MG/1
1000 TABLET, EXTENDED RELEASE ORAL
Qty: 180 TABLET | Refills: 2 | Status: SHIPPED | OUTPATIENT
Start: 2022-04-27 | End: 2022-07-26

## 2022-05-17 ENCOUNTER — VBI (OUTPATIENT)
Dept: ADMINISTRATIVE | Facility: OTHER | Age: 57
End: 2022-05-17

## 2022-06-10 ENCOUNTER — RA CDI HCC (OUTPATIENT)
Dept: OTHER | Facility: HOSPITAL | Age: 57
End: 2022-06-10

## 2022-06-10 NOTE — PROGRESS NOTES
[facility]  coding opportunities       Chart reviewed, no opportunity found: CHART REVIEWED, NO OPPORTUNITY FOUND        Patients Insurance        Commercial Insurance: [employer]

## 2022-06-13 ENCOUNTER — APPOINTMENT (OUTPATIENT)
Dept: LAB | Facility: CLINIC | Age: 57
End: 2022-06-13
Payer: COMMERCIAL

## 2022-06-13 DIAGNOSIS — E53.8 CYANOCOBALAMIN DEFICIENCY: ICD-10-CM

## 2022-06-13 DIAGNOSIS — E55.9 VITAMIN D DEFICIENCY: ICD-10-CM

## 2022-06-13 DIAGNOSIS — E11.9 TYPE 2 DIABETES MELLITUS WITHOUT COMPLICATION, WITHOUT LONG-TERM CURRENT USE OF INSULIN (HCC): ICD-10-CM

## 2022-06-13 DIAGNOSIS — E66.01 CLASS 2 SEVERE OBESITY DUE TO EXCESS CALORIES WITH SERIOUS COMORBIDITY AND BODY MASS INDEX (BMI) OF 39.0 TO 39.9 IN ADULT (HCC): ICD-10-CM

## 2022-06-13 LAB
25(OH)D3 SERPL-MCNC: 43.8 NG/ML (ref 30–100)
ALBUMIN SERPL BCP-MCNC: 3.6 G/DL (ref 3.5–5)
ALP SERPL-CCNC: 53 U/L (ref 46–116)
ALT SERPL W P-5'-P-CCNC: 49 U/L (ref 12–78)
ANION GAP SERPL CALCULATED.3IONS-SCNC: 1 MMOL/L (ref 4–13)
AST SERPL W P-5'-P-CCNC: 26 U/L (ref 5–45)
BASOPHILS # BLD AUTO: 0.06 THOUSANDS/ΜL (ref 0–0.1)
BASOPHILS NFR BLD AUTO: 1 % (ref 0–1)
BILIRUB SERPL-MCNC: 0.38 MG/DL (ref 0.2–1)
BILIRUB UR QL STRIP: NEGATIVE
BUN SERPL-MCNC: 20 MG/DL (ref 5–25)
CALCIUM SERPL-MCNC: 9.2 MG/DL (ref 8.3–10.1)
CHLORIDE SERPL-SCNC: 109 MMOL/L (ref 100–108)
CHOLEST SERPL-MCNC: 203 MG/DL
CLARITY UR: CLEAR
CO2 SERPL-SCNC: 29 MMOL/L (ref 21–32)
COLOR UR: NORMAL
CREAT SERPL-MCNC: 0.95 MG/DL (ref 0.6–1.3)
EOSINOPHIL # BLD AUTO: 0.57 THOUSAND/ΜL (ref 0–0.61)
EOSINOPHIL NFR BLD AUTO: 7 % (ref 0–6)
ERYTHROCYTE [DISTWIDTH] IN BLOOD BY AUTOMATED COUNT: 12 % (ref 11.6–15.1)
EST. AVERAGE GLUCOSE BLD GHB EST-MCNC: 120 MG/DL
GFR SERPL CREATININE-BSD FRML MDRD: 66 ML/MIN/1.73SQ M
GLUCOSE P FAST SERPL-MCNC: 111 MG/DL (ref 65–99)
GLUCOSE UR STRIP-MCNC: NEGATIVE MG/DL
HBA1C MFR BLD: 5.8 %
HCT VFR BLD AUTO: 39.6 % (ref 34.8–46.1)
HDLC SERPL-MCNC: 53 MG/DL
HGB BLD-MCNC: 13.2 G/DL (ref 11.5–15.4)
HGB UR QL STRIP.AUTO: NEGATIVE
IMM GRANULOCYTES # BLD AUTO: 0.02 THOUSAND/UL (ref 0–0.2)
IMM GRANULOCYTES NFR BLD AUTO: 0 % (ref 0–2)
KETONES UR STRIP-MCNC: NEGATIVE MG/DL
LDLC SERPL CALC-MCNC: 127 MG/DL (ref 0–100)
LEUKOCYTE ESTERASE UR QL STRIP: NEGATIVE
LYMPHOCYTES # BLD AUTO: 2.47 THOUSANDS/ΜL (ref 0.6–4.47)
LYMPHOCYTES NFR BLD AUTO: 31 % (ref 14–44)
MCH RBC QN AUTO: 31.6 PG (ref 26.8–34.3)
MCHC RBC AUTO-ENTMCNC: 33.3 G/DL (ref 31.4–37.4)
MCV RBC AUTO: 95 FL (ref 82–98)
MONOCYTES # BLD AUTO: 0.5 THOUSAND/ΜL (ref 0.17–1.22)
MONOCYTES NFR BLD AUTO: 6 % (ref 4–12)
NEUTROPHILS # BLD AUTO: 4.3 THOUSANDS/ΜL (ref 1.85–7.62)
NEUTS SEG NFR BLD AUTO: 55 % (ref 43–75)
NITRITE UR QL STRIP: NEGATIVE
NRBC BLD AUTO-RTO: 0 /100 WBCS
PH UR STRIP.AUTO: 5.5 [PH]
PLATELET # BLD AUTO: 241 THOUSANDS/UL (ref 149–390)
PMV BLD AUTO: 11.7 FL (ref 8.9–12.7)
POTASSIUM SERPL-SCNC: 4.5 MMOL/L (ref 3.5–5.3)
PROT SERPL-MCNC: 7.3 G/DL (ref 6.4–8.2)
PROT UR STRIP-MCNC: NEGATIVE MG/DL
RBC # BLD AUTO: 4.18 MILLION/UL (ref 3.81–5.12)
SODIUM SERPL-SCNC: 139 MMOL/L (ref 136–145)
SP GR UR STRIP.AUTO: 1.02 (ref 1–1.03)
TRIGL SERPL-MCNC: 114 MG/DL
TSH SERPL DL<=0.05 MIU/L-ACNC: 3.81 UIU/ML (ref 0.45–4.5)
URATE SERPL-MCNC: 5 MG/DL (ref 2–6.8)
UROBILINOGEN UR STRIP-ACNC: <2 MG/DL
VIT B12 SERPL-MCNC: 1298 PG/ML (ref 100–900)
WBC # BLD AUTO: 7.92 THOUSAND/UL (ref 4.31–10.16)

## 2022-06-13 PROCEDURE — 84550 ASSAY OF BLOOD/URIC ACID: CPT

## 2022-06-13 PROCEDURE — 80053 COMPREHEN METABOLIC PANEL: CPT

## 2022-06-13 PROCEDURE — 3044F HG A1C LEVEL LT 7.0%: CPT | Performed by: FAMILY MEDICINE

## 2022-06-13 PROCEDURE — 81003 URINALYSIS AUTO W/O SCOPE: CPT | Performed by: FAMILY MEDICINE

## 2022-06-13 PROCEDURE — 82306 VITAMIN D 25 HYDROXY: CPT

## 2022-06-13 PROCEDURE — 84443 ASSAY THYROID STIM HORMONE: CPT

## 2022-06-13 PROCEDURE — 85025 COMPLETE CBC W/AUTO DIFF WBC: CPT

## 2022-06-13 PROCEDURE — 82607 VITAMIN B-12: CPT

## 2022-06-13 PROCEDURE — 83036 HEMOGLOBIN GLYCOSYLATED A1C: CPT

## 2022-06-13 PROCEDURE — 80061 LIPID PANEL: CPT

## 2022-06-13 PROCEDURE — 36415 COLL VENOUS BLD VENIPUNCTURE: CPT

## 2022-06-16 ENCOUNTER — OFFICE VISIT (OUTPATIENT)
Dept: FAMILY MEDICINE CLINIC | Facility: CLINIC | Age: 57
End: 2022-06-16
Payer: COMMERCIAL

## 2022-06-16 VITALS
HEART RATE: 66 BPM | DIASTOLIC BLOOD PRESSURE: 78 MMHG | BODY MASS INDEX: 37.56 KG/M2 | OXYGEN SATURATION: 98 % | HEIGHT: 64 IN | WEIGHT: 220 LBS | TEMPERATURE: 96.7 F | SYSTOLIC BLOOD PRESSURE: 110 MMHG

## 2022-06-16 DIAGNOSIS — F41.9 ANXIETY: ICD-10-CM

## 2022-06-16 DIAGNOSIS — R39.81 FUNCTIONAL URINARY INCONTINENCE: ICD-10-CM

## 2022-06-16 DIAGNOSIS — E66.01 CLASS 2 SEVERE OBESITY DUE TO EXCESS CALORIES WITH SERIOUS COMORBIDITY AND BODY MASS INDEX (BMI) OF 38.0 TO 38.9 IN ADULT (HCC): ICD-10-CM

## 2022-06-16 DIAGNOSIS — Z12.31 ENCOUNTER FOR SCREENING MAMMOGRAM FOR BREAST CANCER: Primary | ICD-10-CM

## 2022-06-16 DIAGNOSIS — Z23 NEED FOR SHINGLES VACCINE: ICD-10-CM

## 2022-06-16 DIAGNOSIS — Z99.89 OSA ON CPAP: ICD-10-CM

## 2022-06-16 DIAGNOSIS — J42 CHRONIC BRONCHITIS, UNSPECIFIED CHRONIC BRONCHITIS TYPE (HCC): ICD-10-CM

## 2022-06-16 DIAGNOSIS — J45.41 MODERATE PERSISTENT ASTHMA WITH ACUTE EXACERBATION: ICD-10-CM

## 2022-06-16 DIAGNOSIS — E11.9 TYPE 2 DIABETES MELLITUS WITHOUT COMPLICATION, WITHOUT LONG-TERM CURRENT USE OF INSULIN (HCC): ICD-10-CM

## 2022-06-16 DIAGNOSIS — G47.33 OSA ON CPAP: ICD-10-CM

## 2022-06-16 PROBLEM — S20.211A CONTUSION OF RIBS, RIGHT, INITIAL ENCOUNTER: Status: RESOLVED | Noted: 2021-09-22 | Resolved: 2022-06-16

## 2022-06-16 PROBLEM — R05.9 COUGH: Status: RESOLVED | Noted: 2021-08-11 | Resolved: 2022-06-16

## 2022-06-16 PROCEDURE — 90750 HZV VACC RECOMBINANT IM: CPT

## 2022-06-16 PROCEDURE — 1036F TOBACCO NON-USER: CPT | Performed by: FAMILY MEDICINE

## 2022-06-16 PROCEDURE — 3078F DIAST BP <80 MM HG: CPT | Performed by: FAMILY MEDICINE

## 2022-06-16 PROCEDURE — 90471 IMMUNIZATION ADMIN: CPT

## 2022-06-16 PROCEDURE — 3074F SYST BP LT 130 MM HG: CPT | Performed by: FAMILY MEDICINE

## 2022-06-16 PROCEDURE — 99214 OFFICE O/P EST MOD 30 MIN: CPT | Performed by: FAMILY MEDICINE

## 2022-06-16 PROCEDURE — 3008F BODY MASS INDEX DOCD: CPT | Performed by: FAMILY MEDICINE

## 2022-06-16 RX ORDER — ESCITALOPRAM OXALATE 10 MG/1
10 TABLET ORAL DAILY
Qty: 90 TABLET | Refills: 2 | Status: SHIPPED | OUTPATIENT
Start: 2022-06-16 | End: 2022-09-14

## 2022-06-16 RX ORDER — CALCIUM CARB/VITAMIN D3/VIT K1 500-500-40
TABLET,CHEWABLE ORAL
COMMUNITY

## 2022-06-16 RX ORDER — OXYBUTYNIN CHLORIDE 10 MG/1
10 TABLET, EXTENDED RELEASE ORAL
Qty: 90 TABLET | Refills: 2 | Status: SHIPPED | OUTPATIENT
Start: 2022-06-16 | End: 2022-09-14

## 2022-06-16 NOTE — PROGRESS NOTES
BMI Counseling: Body mass index is 38 06 kg/m²  The BMI is above normal  Nutrition recommendations include decreasing portion sizes, encouraging healthy choices of fruits and vegetables, limiting drinks that contain sugar and reducing intake of cholesterol  Exercise recommendations include exercising 3-5 times per week  No pharmacotherapy was ordered  Rationale for BMI follow-up plan is due to patient being overweight or obese  Assessment/Plan:       Discussed with patient blood test results in detail  A1c is 5 8 at goal   Blood pressure stable  LDL is 127 triglycerides normal   CBCs CMP thyroid urine are normal   Continue medication for her  Second shingle vaccine given today to finish the series  Will see her back in 6 months for complete physical and Pap smear  She is allergic to pneumonia vaccine  I have spent 30 minutes with Patient  today in which greater than 50% of this time was spent in counseling/coordination of care regarding Diagnostic results, Prognosis, Risks and benefits of tx options and Intructions for management        Problem List Items Addressed This Visit        Endocrine    Type 2 diabetes mellitus without complication, without long-term current use of insulin ([facility] [address] )    Relevant Orders    Microalbumin / creatinine urine ratio ([facility])    CBC and differential    Comprehensive metabolic panel    Hemoglobin A1C       Respiratory    Moderate persistent asthma with acute exacerbation    Chronic bronchitis, unspecified chronic bronchitis type (HCC)    NICKY on CPAP       Other    Functional urinary incontinence    Relevant Medications    oxybutynin (DITROPAN-XL) 10 MG 24 hr tablet    Other Relevant Orders    UA w Reflex to Microscopic w Reflex to Culture    Anxiety    Relevant Medications    escitalopram (LEXAPRO) 10 mg tablet    Class 2 obesity due to excess calories in adult      Other Visit Diagnoses     Encounter for screening mammogram for breast cancer    -  Primary    Relevant Orders    Mammo screening bilateral w 3d & cad    Need for shingles vaccine        Relevant Orders    Zoster Vaccine Recombinant IM (Completed)            Subjective:      Patient ID: Greer Rahman is a 62 y o  female  42-year-old female follow-up type 2 diabetes  She is on metformin [id number] mg daily  She has functional urinary incontinent she is on oxybutynin 10 mg daily  She has asthma stable  She has obstructive sleep apnea on CPAP automatic pressure 13 cm daily  She is taking beet root 1000 mg 3 tabs a day  She has anxiety on Lexapro 10 mg daily she is doing well  She is a respiratory therapist       The following portions of the patient's history were reviewed and updated as appropriate:   Past Medical History:  She has a past medical history of Anxiety (9/16/2020), Arthritis, Asthma, Class 2 obesity due to excess calories in adult (2/24/2021), Contusion of ribs, right, initial encounter (9/22/2021), Diabetes mellitus ([facility] [address] ), Functional urinary incontinence (9/14/2020), Hypertension, Moderate persistent asthma with acute exacerbation (9/16/2020), Obesity, Sleep apnea, Type 2 diabetes mellitus without complication, without long-term current use of insulin ([facility] [address] ) (9/16/2020), and Vitamin D deficiency  ,  _______________________________________________________________________  Medical Problems:  does not have any pertinent problems on file ,  _______________________________________________________________________  Past Surgical History:   has a past surgical history that includes Tonsillectomy; Colonoscopy (01/01/2015); Hysteroscopy (01/01/2008); Other surgical history (1998); Cyst Removal; and Mammo (historical) (01/04/2016)  ,  _______________________________________________________________________  Family History:  family history includes ADD / ADHD in her father;  Anxiety disorder in her father; Arthritis in her brother, father, and mother; Bipolar disorder in her brother; Cancer in her brother; Depression in her brother, father, and mother; Diabetes in her mother; Drug abuse in her brother; Heart attack in her mother; Hyperlipidemia in her mother; Hypertension in her father and mother; Rheum arthritis in her father and mother; Substance Abuse in her brother; Suicide Attempts in her brother ,  _______________________________________________________________________  Social History:   reports that she has never smoked  She has never used smokeless tobacco  She reports current alcohol use  She reports that she does not use drugs  ,  _______________________________________________________________________  Allergies:  is allergic to bee venom, pneumococcal vaccines, and other     _______________________________________________________________________  Current Outpatient Medications   Medication Sig Dispense Refill    acetaminophen (TYLENOL) 325 mg tablet Take 650 mg by mouth every 6 (six) hours as needed for mild pain      b complex vitamins tablet Take 1 tablet by mouth daily      cholecalciferol (VITAMIN D3) 1,000 units tablet Take 1,000 Units by mouth daily 2000 units per tablet - takes 2 tabs daily      Chromium Picolinate 1000 MCG TABS Take by mouth Once daily      COLLAGEN PO Take 20 mg by mouth Daily      escitalopram (LEXAPRO) 10 mg tablet Take 1 tablet (10 mg total) by mouth daily 90 tablet 2    fluticasone (FLONASE) 50 mcg/act nasal spray 2 sprays into each nostril daily Per nare daily      ibuprofen (MOTRIN) 800 mg tablet Take by mouth every 6 (six) hours as needed for mild pain      levalbuterol (XOPENEX) 0 31 mg/3 mL nebulizer solution Take 1 ampule by nebulization every 4 (four) hours as needed for wheezing      Loratadine 10 MG CAPS Take by mouth      metFORMIN (GLUCOPHAGE-XR) 500 mg 24 hr tablet Take 2 tablets (1,000 mg total) by mouth daily with breakfast 180 tablet 2    Omega-3 Fatty Acids (Fish Oil) 1200 MG CPDR Take 1,200 mg by mouth 2 daily      oxybutynin (DITROPAN-XL) 10 MG 24 hr tablet Take 1 tablet (10 mg total) by mouth daily at bedtime 90 tablet 2    EPINEPHrine (EPIPEN JR) 0 15 mg/0 3 mL SOAJ Inject 0 15 mg into a muscle once (Patient not taking: Reported on 6/16/2022)       No current facility-administered medications for this visit      _______________________________________________________________________  Review of Systems   Constitutional: Negative for activity change, appetite change, fatigue and unexpected weight change  HENT: Negative for ear pain, sore throat, trouble swallowing and voice change  Eyes: Negative for photophobia and visual disturbance  Respiratory: Negative for cough, chest tightness, shortness of breath and wheezing  Cardiovascular: Negative for chest pain, palpitations and leg swelling  Gastrointestinal: Negative for abdominal pain, constipation, diarrhea, nausea, rectal pain and vomiting  Endocrine: Negative for cold intolerance, polydipsia and polyuria  Genitourinary: Negative for difficulty urinating, dysuria, flank pain, menstrual problem and pelvic pain  Musculoskeletal: Negative for arthralgias, joint swelling and myalgias  Skin: Negative for color change and rash  Allergic/Immunologic: Negative for environmental allergies and immunocompromised state  Neurological: Negative for dizziness, weakness, numbness and headaches  Hematological: Negative for adenopathy  Does not bruise/bleed easily  Psychiatric/Behavioral: Negative for decreased concentration, dysphoric mood, self-injury, sleep disturbance and suicidal ideas  The patient is not nervous/anxious  Objective:  Vitals:    06/16/22 1049   BP: 110/78   BP Location: Left arm   Patient Position: Sitting   Cuff Size: Large   Pulse: 66   Temp: (!) 96 7 °F (35 9 °C)   TempSrc: Tympanic   SpO2: 98%   Weight: 99 8 kg (220 lb)   Height: 5' 3 75" (1 619 m)     Body mass index is 38 06 kg/m²  Physical Exam  Vitals and nursing note reviewed     Constitutional: Appearance: Normal appearance  She is well-developed  She is obese  HENT:      Head: Normocephalic and atraumatic  Right Ear: Tympanic membrane, ear canal and external ear normal       Left Ear: Tympanic membrane, ear canal and external ear normal       Nose: Nose normal       Mouth/Throat:      Mouth: Mucous membranes are dry  Pharynx: Oropharynx is clear  No oropharyngeal exudate  Eyes:      Extraocular Movements: Extraocular movements intact  Pupils: Pupils are equal, round, and reactive to light  Neck:      Thyroid: No thyromegaly  Cardiovascular:      Rate and Rhythm: Normal rate and regular rhythm  Pulses: Normal pulses  Heart sounds: Normal heart sounds  No murmur heard  Pulmonary:      Effort: Pulmonary effort is normal  No respiratory distress  Breath sounds: Normal breath sounds  No wheezing or rales  Abdominal:      General: Bowel sounds are normal  There is no distension  Palpations: Abdomen is soft  Tenderness: There is no abdominal tenderness  There is no guarding  Genitourinary:     Vagina: Normal    Musculoskeletal:         General: No tenderness  Normal range of motion  Cervical back: Normal range of motion and neck supple  Skin:     General: Skin is warm and dry  Capillary Refill: Capillary refill takes less than 2 seconds  Findings: No rash  Neurological:      General: No focal deficit present  Mental Status: She is alert and oriented to person, place, and time  Mental status is at baseline  Psychiatric:         Mood and Affect: Mood normal          Behavior: Behavior normal          Thought Content:  Thought content normal          Judgment: Judgment normal

## 2022-07-12 ENCOUNTER — HOSPITAL ENCOUNTER (OUTPATIENT)
Dept: MAMMOGRAPHY | Facility: HOSPITAL | Age: 57
Discharge: HOME | End: 2022-07-12
Attending: FAMILY MEDICINE
Payer: COMMERCIAL

## 2022-07-12 ENCOUNTER — VBI (OUTPATIENT)
Dept: ADMINISTRATIVE | Facility: OTHER | Age: 57
End: 2022-07-12

## 2022-07-12 VITALS — HEIGHT: 64 IN | BODY MASS INDEX: 37.56 KG/M2 | WEIGHT: 220.02 LBS

## 2022-07-12 DIAGNOSIS — Z12.31 ENCOUNTER FOR SCREENING MAMMOGRAM FOR BREAST CANCER: ICD-10-CM

## 2022-07-12 PROCEDURE — 77067 SCR MAMMO BI INCL CAD: CPT

## 2022-07-12 PROCEDURE — 77063 BREAST TOMOSYNTHESIS BI: CPT

## 2022-07-15 ENCOUNTER — TELEPHONE (OUTPATIENT)
Dept: FAMILY MEDICINE CLINIC | Facility: CLINIC | Age: 57
End: 2022-07-15

## 2022-07-15 NOTE — TELEPHONE ENCOUNTER
----- Message from Emi Vallejo MD sent at 7/13/2022  8:05 AM EDT -----  Please let pt know her mammogram is normal

## 2022-08-31 ENCOUNTER — VBI (OUTPATIENT)
Dept: ADMINISTRATIVE | Facility: OTHER | Age: 57
End: 2022-08-31

## 2022-09-28 LAB
LEFT EYE DIABETIC RETINOPATHY: NORMAL
RIGHT EYE DIABETIC RETINOPATHY: NORMAL
SEVERITY (EYE EXAM): NORMAL

## 2022-10-11 DIAGNOSIS — R39.81 FUNCTIONAL URINARY INCONTINENCE: ICD-10-CM

## 2022-10-11 DIAGNOSIS — F41.9 ANXIETY: ICD-10-CM

## 2022-10-11 DIAGNOSIS — E11.9 TYPE 2 DIABETES MELLITUS WITHOUT COMPLICATION, WITHOUT LONG-TERM CURRENT USE OF INSULIN (HCC): ICD-10-CM

## 2022-10-11 RX ORDER — OXYBUTYNIN CHLORIDE 10 MG/1
10 TABLET, EXTENDED RELEASE ORAL
Qty: 90 TABLET | Refills: 0 | Status: SHIPPED | OUTPATIENT
Start: 2022-10-11 | End: 2023-01-09

## 2022-10-11 RX ORDER — METFORMIN HYDROCHLORIDE 500 MG/1
1000 TABLET, EXTENDED RELEASE ORAL
Qty: 180 TABLET | Refills: 0 | Status: SHIPPED | OUTPATIENT
Start: 2022-10-11 | End: 2023-01-09

## 2022-10-11 RX ORDER — ESCITALOPRAM OXALATE 10 MG/1
10 TABLET ORAL DAILY
Qty: 90 TABLET | Refills: 0 | Status: SHIPPED | OUTPATIENT
Start: 2022-10-11 | End: 2023-01-09

## 2022-10-12 PROBLEM — J01.20 SUBACUTE ETHMOIDAL SINUSITIS: Status: RESOLVED | Noted: 2021-08-11 | Resolved: 2022-10-12

## 2022-11-14 ENCOUNTER — VBI (OUTPATIENT)
Dept: ADMINISTRATIVE | Facility: OTHER | Age: 57
End: 2022-11-14

## 2022-12-07 ENCOUNTER — RA CDI HCC (OUTPATIENT)
Dept: OTHER | Facility: HOSPITAL | Age: 57
End: 2022-12-07

## 2022-12-07 NOTE — PROGRESS NOTES
[facility]       Chart reviewed, no opportunity found: CHART REVIEWED, NO OPPORTUNITY FOUND        Patients Insurance        Commercial Insurance: [address] [place] [address]

## 2022-12-15 ENCOUNTER — OFFICE VISIT (OUTPATIENT)
Dept: FAMILY MEDICINE CLINIC | Facility: CLINIC | Age: 57
End: 2022-12-15

## 2022-12-15 ENCOUNTER — TELEPHONE (OUTPATIENT)
Dept: OBGYN CLINIC | Facility: OTHER | Age: 57
End: 2022-12-15

## 2022-12-15 ENCOUNTER — APPOINTMENT (OUTPATIENT)
Dept: LAB | Facility: CLINIC | Age: 57
End: 2022-12-15

## 2022-12-15 VITALS
TEMPERATURE: 97.1 F | RESPIRATION RATE: 16 BRPM | OXYGEN SATURATION: 95 % | DIASTOLIC BLOOD PRESSURE: 80 MMHG | SYSTOLIC BLOOD PRESSURE: 110 MMHG | BODY MASS INDEX: 38.06 KG/M2 | HEIGHT: 64 IN | HEART RATE: 86 BPM

## 2022-12-15 DIAGNOSIS — Z99.89 OSA ON CPAP: ICD-10-CM

## 2022-12-15 DIAGNOSIS — Z12.4 SCREENING FOR CERVICAL CANCER: ICD-10-CM

## 2022-12-15 DIAGNOSIS — R39.81 FUNCTIONAL URINARY INCONTINENCE: ICD-10-CM

## 2022-12-15 DIAGNOSIS — J45.41 MODERATE PERSISTENT ASTHMA WITH ACUTE EXACERBATION: ICD-10-CM

## 2022-12-15 DIAGNOSIS — E11.9 TYPE 2 DIABETES MELLITUS WITHOUT COMPLICATION, WITHOUT LONG-TERM CURRENT USE OF INSULIN (HCC): Primary | ICD-10-CM

## 2022-12-15 DIAGNOSIS — M65.341 TRIGGER RING FINGER OF RIGHT HAND: ICD-10-CM

## 2022-12-15 DIAGNOSIS — E11.9 TYPE 2 DIABETES MELLITUS WITHOUT COMPLICATION, WITHOUT LONG-TERM CURRENT USE OF INSULIN (HCC): ICD-10-CM

## 2022-12-15 DIAGNOSIS — G47.33 OSA ON CPAP: ICD-10-CM

## 2022-12-15 DIAGNOSIS — K76.0 FATTY LIVER: ICD-10-CM

## 2022-12-15 DIAGNOSIS — Z00.00 ANNUAL PHYSICAL EXAM: ICD-10-CM

## 2022-12-15 DIAGNOSIS — E66.01 CLASS 2 SEVERE OBESITY DUE TO EXCESS CALORIES WITH SERIOUS COMORBIDITY AND BODY MASS INDEX (BMI) OF 38.0 TO 38.9 IN ADULT (HCC): ICD-10-CM

## 2022-12-15 LAB
ALBUMIN SERPL BCP-MCNC: 3.5 G/DL (ref 3.5–5)
ALP SERPL-CCNC: 67 U/L (ref 46–116)
ALT SERPL W P-5'-P-CCNC: 87 U/L (ref 12–78)
ANION GAP SERPL CALCULATED.3IONS-SCNC: 5 MMOL/L (ref 4–13)
AST SERPL W P-5'-P-CCNC: 41 U/L (ref 5–45)
BASOPHILS # BLD AUTO: 0.08 THOUSANDS/ÂΜL (ref 0–0.1)
BASOPHILS NFR BLD AUTO: 1 % (ref 0–1)
BILIRUB SERPL-MCNC: 0.26 MG/DL (ref 0.2–1)
BILIRUB UR QL STRIP: NEGATIVE
BUN SERPL-MCNC: 14 MG/DL (ref 5–25)
CALCIUM SERPL-MCNC: 9.1 MG/DL (ref 8.3–10.1)
CHLORIDE SERPL-SCNC: 108 MMOL/L (ref 96–108)
CLARITY UR: CLEAR
CO2 SERPL-SCNC: 27 MMOL/L (ref 21–32)
COLOR UR: NORMAL
CREAT SERPL-MCNC: 0.88 MG/DL (ref 0.6–1.3)
CREAT UR-MCNC: 87.1 MG/DL
EOSINOPHIL # BLD AUTO: 0.69 THOUSAND/ÂΜL (ref 0–0.61)
EOSINOPHIL NFR BLD AUTO: 8 % (ref 0–6)
ERYTHROCYTE [DISTWIDTH] IN BLOOD BY AUTOMATED COUNT: 11.8 % (ref 11.6–15.1)
EST. AVERAGE GLUCOSE BLD GHB EST-MCNC: 143 MG/DL
GFR SERPL CREATININE-BSD FRML MDRD: 73 ML/MIN/1.73SQ M
GLUCOSE P FAST SERPL-MCNC: 157 MG/DL (ref 65–99)
GLUCOSE UR STRIP-MCNC: NEGATIVE MG/DL
HBA1C MFR BLD: 6.6 %
HCT VFR BLD AUTO: 38.6 % (ref 34.8–46.1)
HGB BLD-MCNC: 12.9 G/DL (ref 11.5–15.4)
HGB UR QL STRIP.AUTO: NEGATIVE
IMM GRANULOCYTES # BLD AUTO: 0.02 THOUSAND/UL (ref 0–0.2)
IMM GRANULOCYTES NFR BLD AUTO: 0 % (ref 0–2)
KETONES UR STRIP-MCNC: NEGATIVE MG/DL
LEUKOCYTE ESTERASE UR QL STRIP: NEGATIVE
LYMPHOCYTES # BLD AUTO: 2.75 THOUSANDS/ÂΜL (ref 0.6–4.47)
LYMPHOCYTES NFR BLD AUTO: 34 % (ref 14–44)
MCH RBC QN AUTO: 31 PG (ref 26.8–34.3)
MCHC RBC AUTO-ENTMCNC: 33.4 G/DL (ref 31.4–37.4)
MCV RBC AUTO: 93 FL (ref 82–98)
MICROALBUMIN UR-MCNC: 7.2 MG/L (ref 0–20)
MICROALBUMIN/CREAT 24H UR: 8 MG/G CREATININE (ref 0–30)
MONOCYTES # BLD AUTO: 0.59 THOUSAND/ÂΜL (ref 0.17–1.22)
MONOCYTES NFR BLD AUTO: 7 % (ref 4–12)
NEUTROPHILS # BLD AUTO: 4.05 THOUSANDS/ÂΜL (ref 1.85–7.62)
NEUTS SEG NFR BLD AUTO: 50 % (ref 43–75)
NITRITE UR QL STRIP: NEGATIVE
NRBC BLD AUTO-RTO: 0 /100 WBCS
PH UR STRIP.AUTO: 5.5 [PH]
PLATELET # BLD AUTO: 256 THOUSANDS/UL (ref 149–390)
PMV BLD AUTO: 11.4 FL (ref 8.9–12.7)
POTASSIUM SERPL-SCNC: 4.2 MMOL/L (ref 3.5–5.3)
PROT SERPL-MCNC: 7.1 G/DL (ref 6.4–8.4)
PROT UR STRIP-MCNC: NEGATIVE MG/DL
RBC # BLD AUTO: 4.16 MILLION/UL (ref 3.81–5.12)
SODIUM SERPL-SCNC: 140 MMOL/L (ref 135–147)
SP GR UR STRIP.AUTO: 1.01 (ref 1–1.03)
UROBILINOGEN UR STRIP-ACNC: <2 MG/DL
WBC # BLD AUTO: 8.18 THOUSAND/UL (ref 4.31–10.16)

## 2022-12-15 NOTE — PROGRESS NOTES
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  Currently  only on paper, not sexually active x 2 years  Pap done today  Nulliparous  Just adopted a dog  Has an adopted daughter 26 yo  Has asthma=doing well on current tx  Allergic to pneumonia vaccine  Mammogram normal 2022  C/o right hand 4th finger trigger finger, pain, decreased ROM=refer to ortho for eval  Fu in 6 m if blood work normal  Problem List Items Addressed This Visit        Digestive    Fatty liver       Endocrine    Type 2 diabetes mellitus without complication, without long-term current use of insulin ([address] ) - Primary       Respiratory    Moderate persistent asthma with acute exacerbation    NICKY on CPAP       Musculoskeletal and Integument    Trigger ring finger of right hand    Relevant Orders    Ambulatory Referral to Orthopedic Surgery       Other    Functional urinary incontinence    Class 2 obesity due to excess calories in adult   Other Visit Diagnoses     Annual physical exam        Screening for cervical cancer        Relevant Orders    Liquid-based pap, screening ([facility])    HPV High Risk ([facility])          Immunizations and preventive care screenings were discussed with patient today  Appropriate education was printed on patient's after visit summary  Counseling:  Exercise: the importance of regular exercise/physical activity was discussed  Recommend exercise 3-5 times per week for at least 30 minutes  Depression Screening and Follow-up Plan: Patient was screened for depression during today's encounter  They screened negative with a PHQ-2 score of 0          Return in about 6 months (around 6/15/2023) for established care for diabetes  Chief Complaint:     Chief Complaint   Patient presents with   • Physical Exam      History of Present Illness:     Adult Annual Physical   Patient here for a comprehensive physical exam  The patient reports no problems  Diet and Physical Activity  Diet/Nutrition: well balanced diet  Exercise: walking  Depression Screening  PHQ-2/9 Depression Screening    Little interest or pleasure in doing things: 0 - not at all  Feeling down, depressed, or hopeless: 0 - not at all  PHQ-2 Score: 0  PHQ-2 Interpretation: Negative depression screen       General Health  Sleep: sleeps well  Hearing: normal - bilateral   Vision: no vision problems  Dental: regular dental visits  /GYN Health  Patient is: postmenopausal  Last menstrual period: 9 y ago  Contraceptive method: none  Review of Systems:     Review of Systems   Constitutional: Negative for activity change, appetite change, fatigue and unexpected weight change  HENT: Negative for ear pain, sore throat, trouble swallowing and voice change  Eyes: Negative for photophobia and visual disturbance  Respiratory: Negative for cough, chest tightness, shortness of breath and wheezing  Cardiovascular: Negative for chest pain, palpitations and leg swelling  Gastrointestinal: Negative for abdominal pain, constipation, diarrhea, nausea, rectal pain and vomiting  Endocrine: Negative for cold intolerance, polydipsia and polyuria  Genitourinary: Negative for difficulty urinating, dysuria, flank pain, menstrual problem and pelvic pain  Musculoskeletal: Negative for arthralgias, joint swelling and myalgias  Right hand 4th finger pain   Skin: Negative for color change and rash  Allergic/Immunologic: Negative for environmental allergies and immunocompromised state  Neurological: Negative for dizziness, weakness, numbness and headaches  Hematological: Negative for adenopathy   Does not bruise/bleed easily  Psychiatric/Behavioral: Negative for decreased concentration, dysphoric mood, self-injury, sleep disturbance and suicidal ideas  The patient is not nervous/anxious  Past Medical History:     Past Medical History:   Diagnosis Date   • Anxiety 9/16/2020   • Arthritis    • Asthma    • Class 2 obesity due to excess calories in adult 2/24/2021   • Contusion of ribs, right, initial encounter 9/22/2021   • Diabetes mellitus ([facility] )    • Functional urinary incontinence 9/14/2020   • Hypertension    • Moderate persistent asthma with acute exacerbation 9/16/2020   • Obesity    • Sleep apnea    • Type 2 diabetes mellitus without complication, without long-term current use of insulin ([facility] [address] [facility] ) 9/16/2020   • Vitamin D deficiency       Past Surgical History:     Past Surgical History:   Procedure Laterality Date   • COLONOSCOPY  01/01/2015    tiny polyp=next 2020   • CYST REMOVAL      thumb cyst   • HYSTEROSCOPY  01/01/2008    with biopsy   • MAMMO (HISTORICAL)  01/04/2016    normal=[facility]   • OTHER SURGICAL HISTORY  1998    tongue papiloma   • TONSILLECTOMY        Social History:     Social History     Socioeconomic History   • Marital status: /Civil Union     Spouse name: None   • Number of children: None   • Years of education: None   • Highest education level: None   Occupational History   • None   Tobacco Use   • Smoking status: Never   • Smokeless tobacco: Never   Vaping Use   • Vaping Use: Never used   Substance and Sexual Activity   • Alcohol use: Yes     Alcohol/week: 0 0 standard drinks     Comment: Three to four per month hard cider     • Drug use: Never   • Sexual activity: Not Currently     Partners: Male     Birth control/protection: Post-menopausal   Other Topics Concern   • None   Social History Narrative    · Do you currently or have you served in the [employer]:   No      · Were you activated, into active duty, as a member of the [email or web address] or as a Reservist: No      · Marital status:         · Exercise level:    Moderate      · Diet:   Regular      · General stress level:   Low       · Caffeine intake:   Occasional        · Guns present in home:   No      · Seat belts used routinely:   Yes      · Sunscreen used routinely:   Yes      · Advance directive:   No         Per bianca      Social Determinants of Health     Financial Resource Strain: Not on file   Food Insecurity: Not on file   Transportation Needs: Not on file   Physical Activity: Not on file   Stress: Not on file   Social Connections: Not on file   Intimate Partner Violence: Not on file   Housing Stability: Not on file      Family History:     Family History   Problem Relation Age of Onset   • Diabetes Mother    • Hypertension Mother    • Depression Mother    • Arthritis Mother    • Hyperlipidemia Mother    • Heart attack Mother    • Rheum arthritis Mother    • Hypertension Father    • Depression Father    • Arthritis Father    • Rheum arthritis Father    • ADD / ADHD Father    • Anxiety disorder Father    • Depression Brother    • Arthritis Brother    • Cancer Brother 52        malignant neoplasm of lip, oral cavity, and pharynx   • Drug abuse Brother    • Substance Abuse Brother    • Bipolar disorder Brother    • Suicide Attempts Brother    • Breast cancer Maternal Aunt 30   • Breast cancer Maternal Aunt 30   • Breast cancer Maternal Aunt 72      Current Medications:     Current Outpatient Medications   Medication Sig Dispense Refill   • acetaminophen (TYLENOL) 325 mg tablet Take 650 mg by mouth every 6 (six) hours as needed for mild pain     • b complex vitamins tablet Take 1 tablet by mouth daily     • cholecalciferol (VITAMIN D3) 1,000 units tablet Take 1,000 Units by mouth daily 2000 units per tablet - takes 2 tabs daily     • Chromium Picolinate 1000 MCG TABS Take by mouth Once daily     • COLLAGEN PO Take 20 mg by mouth Daily     • escitalopram (LEXAPRO) 10 mg tablet Take 1 tablet (10 mg total) by mouth daily 90 tablet 0   • fluticasone (FLONASE) 50 mcg/act nasal spray 2 sprays into each nostril daily Per nare daily     • ibuprofen (MOTRIN) 800 mg tablet Take by mouth every 6 (six) hours as needed for mild pain     • levalbuterol (XOPENEX) 0 31 mg/3 mL nebulizer solution Take 1 ampule by nebulization every 4 (four) hours as needed for wheezing     • Loratadine 10 MG CAPS Take by mouth     • metFORMIN (GLUCOPHAGE-XR) 500 mg 24 hr tablet Take 2 tablets (1,000 mg total) by mouth daily with breakfast 180 tablet 0   • Omega-3 Fatty Acids (Fish Oil) 1200 MG CPDR Take 1,200 mg by mouth 2 daily     • oxybutynin (DITROPAN-XL) 10 MG 24 hr tablet Take 1 tablet (10 mg total) by mouth daily at bedtime 90 tablet 0   • EPINEPHrine (EPIPEN JR) 0 15 mg/0 3 mL SOAJ Inject 0 15 mg into a muscle once (Patient not taking: Reported on 6/16/2022)       No current facility-administered medications for this visit  Allergies: Allergies   Allergen Reactions   • Bee Venom Anaphylaxis, Hives, Shortness Of Breath, Throat Swelling and Wheezing   • Pneumococcal Vaccines    • Other Rash and Sneezing     Beech, birch (rash), maple tree pollen  Dog and cat danger  Physical Exam:     /80 (BP Location: Right arm, Patient Position: Sitting, Cuff Size: Adult)   Pulse 86   Temp (!) 97 1 °F (36 2 °C) (Temporal)   Resp 16   Ht 5' 3 75" (1 619 m)   SpO2 95%   BMI 38 06 kg/m²     Physical Exam  Vitals and nursing note reviewed  Constitutional:       General: She is not in acute distress  Appearance: Normal appearance  She is well-developed  HENT:      Head: Normocephalic and atraumatic  Right Ear: Tympanic membrane, ear canal and external ear normal       Left Ear: Tympanic membrane, ear canal and external ear normal       Nose: Nose normal       Mouth/Throat:      Mouth: Mucous membranes are moist       Pharynx: Oropharynx is clear  Eyes:      Extraocular Movements: Extraocular movements intact  Conjunctiva/sclera: Conjunctivae normal       Pupils: Pupils are equal, round, and reactive to light  Cardiovascular:      Rate and Rhythm: Normal rate and regular rhythm  Pulses: Normal pulses  Heart sounds: Normal heart sounds  No murmur heard  Pulmonary:      Effort: Pulmonary effort is normal  No respiratory distress  Breath sounds: Normal breath sounds  Chest:      Chest wall: No mass  Breasts: Kobe Score is 5  Right: Normal       Left: Normal    Abdominal:      General: Abdomen is flat  Bowel sounds are normal       Palpations: Abdomen is soft  Tenderness: There is no abdominal tenderness  Hernia: There is no hernia in the left inguinal area or right inguinal area  Genitourinary:     General: Normal vulva  Exam position: Supine  Kobe stage (genital): 5  Labia:         Right: No rash  Left: No rash  Urethra: No prolapse or urethral pain  Vagina: Normal  No vaginal discharge  Cervix: Normal       Uterus: Normal        Adnexa: Right adnexa normal and left adnexa normal       Comments: Pap done  Pt refused rectal exam  Musculoskeletal:         General: No swelling  Normal range of motion  Cervical back: Normal range of motion and neck supple  Lymphadenopathy:      Upper Body:      Right upper body: No supraclavicular adenopathy  Left upper body: No supraclavicular adenopathy  Skin:     General: Skin is warm and dry  Capillary Refill: Capillary refill takes less than 2 seconds  Neurological:      General: No focal deficit present  Mental Status: She is alert and oriented to person, place, and time  Mental status is at baseline  Psychiatric:         Mood and Affect: Mood normal          Behavior: Behavior normal          Thought Content:  Thought content normal          Judgment: Judgment normal           Emi Gonzales MD  [facility]

## 2022-12-15 NOTE — TELEPHONE ENCOUNTER
Patient is being referred to a orthopedics  Please schedule accordingly      [address]   [phone]

## 2022-12-15 NOTE — PATIENT INSTRUCTIONS

## 2022-12-16 LAB
HPV HR 12 DNA CVX QL NAA+PROBE: POSITIVE
HPV16 DNA CVX QL NAA+PROBE: NEGATIVE
HPV18 DNA CVX QL NAA+PROBE: NEGATIVE

## 2022-12-19 ENCOUNTER — TELEPHONE (OUTPATIENT)
Dept: FAMILY MEDICINE CLINIC | Facility: CLINIC | Age: 57
End: 2022-12-19

## 2022-12-21 ENCOUNTER — PREP FOR PROCEDURE (OUTPATIENT)
Dept: OBGYN CLINIC | Facility: CLINIC | Age: 57
End: 2022-12-21

## 2022-12-21 ENCOUNTER — OFFICE VISIT (OUTPATIENT)
Dept: OBGYN CLINIC | Facility: CLINIC | Age: 57
End: 2022-12-21

## 2022-12-21 VITALS
SYSTOLIC BLOOD PRESSURE: 130 MMHG | DIASTOLIC BLOOD PRESSURE: 83 MMHG | BODY MASS INDEX: 37.56 KG/M2 | HEIGHT: 64 IN | HEART RATE: 63 BPM | WEIGHT: 220 LBS | OXYGEN SATURATION: 98 %

## 2022-12-21 DIAGNOSIS — M65.341 TRIGGER RING FINGER OF RIGHT HAND: Primary | ICD-10-CM

## 2022-12-21 RX ORDER — CHLORHEXIDINE GLUCONATE 4 G/100ML
SOLUTION TOPICAL DAILY PRN
OUTPATIENT
Start: 2022-12-21

## 2022-12-21 RX ORDER — CHLORHEXIDINE GLUCONATE 0.12 MG/ML
15 RINSE ORAL ONCE
OUTPATIENT
Start: 2022-12-21 | End: 2022-12-21

## 2022-12-21 NOTE — PROGRESS NOTES
[address] [address]  [phone]       German Mauricio  [id number]  1965    ORTHOPAEDIC SURGERY OUTPATIENT NOTE  12/21/2022      HISTORY:  62 y o  female who presents the office today for an initial evaluation of her right ring finger  She states that over the past month, she has noticed locking and pain about her right ring finger  She states that its worse in the morning  She has been performing stretches about her finger which she states helps  Past Medical History:   Diagnosis Date   • Anxiety 9/16/2020   • Arthritis    • Asthma    • Class 2 obesity due to excess calories in adult 2/24/2021   • Contusion of ribs, right, initial encounter 9/22/2021   • Diabetes mellitus ([facility] [address] )    • Functional urinary incontinence 9/14/2020   • Hypertension    • Moderate persistent asthma with acute exacerbation 9/16/2020   • Obesity    • Sleep apnea    • Type 2 diabetes mellitus without complication, without long-term current use of insulin ([facility] [address] ) 9/16/2020   • Vitamin D deficiency        Past Surgical History:   Procedure Laterality Date   • COLONOSCOPY  01/01/2015    tiny polyp=next 2020   • CYST REMOVAL      thumb cyst   • HYSTEROSCOPY  01/01/2008    with biopsy   • MAMMO (HISTORICAL)  01/04/2016    normal=[facility]   • OTHER SURGICAL HISTORY  1998    tongue papiloma   • TONSILLECTOMY         Social History     Socioeconomic History   • Marital status: /Civil Union     Spouse name: Not on file   • Number of children: Not on file   • Years of education: Not on file   • Highest education level: Not on file   Occupational History   • Not on file   Tobacco Use   • Smoking status: Never   • Smokeless tobacco: Never   Vaping Use   • Vaping Use: Never used   Substance and Sexual Activity   • Alcohol use: Yes     Alcohol/week: 0 0 standard drinks     Comment: Three to four per month hard cider     • Drug use: Never   • Sexual activity: Not Currently     Partners: Male     Birth control/protection: Post-menopausal   Other Topics Concern   • Not on file   Social History Narrative    · Do you currently or have you served in the [employer]:   No      · Were you activated, into active duty, as a member of the [employer] or as a Reservist:   No      · Marital status:         · Exercise level:    Moderate      · Diet:   Regular      · General stress level:   Low       · Caffeine intake:   Occasional        · Guns present in home:   No      · Seat belts used routinely:   Yes      · Sunscreen used routinely:   Yes      · Advance directive:   No         Per bianca      Social Determinants of Health     Financial Resource Strain: Not on file   Food Insecurity: Not on file   Transportation Needs: Not on file   Physical Activity: Not on file   Stress: Not on file   Social Connections: Not on file   Intimate Partner Violence: Not on file   Housing Stability: Not on file       Family History   Problem Relation Age of Onset   • Diabetes Mother    • Hypertension Mother    • Depression Mother    • Arthritis Mother    • Hyperlipidemia Mother    • Heart attack Mother    • Rheum arthritis Mother    • Hypertension Father    • Depression Father    • Arthritis Father    • Rheum arthritis Father    • ADD / ADHD Father    • Anxiety disorder Father    • Depression Brother    • Arthritis Brother    • Cancer Brother 52        malignant neoplasm of lip, oral cavity, and pharynx   • Drug abuse Brother    • Substance Abuse Brother    • Bipolar disorder Brother    • Suicide Attempts Brother    • Breast cancer Maternal Aunt 30   • Breast cancer Maternal Aunt 30   • Breast cancer Maternal Aunt 72        Patient's Medications   New Prescriptions    No medications on file   Previous Medications    ACETAMINOPHEN (TYLENOL) 325 MG TABLET    Take 650 mg by mouth every 6 (six) hours as needed for mild pain    B COMPLEX VITAMINS TABLET Take 1 tablet by mouth daily    CHOLECALCIFEROL (VITAMIN D3) 1,000 UNITS TABLET    Take 1,000 Units by mouth daily 2000 units per tablet - takes 2 tabs daily    CHROMIUM PICOLINATE 1000 MCG TABS    Take by mouth Once daily    COLLAGEN PO    Take 20 mg by mouth Daily    EPINEPHRINE (EPIPEN JR) 0 15 MG/0 3 ML SOAJ    Inject 0 15 mg into a muscle once    ESCITALOPRAM (LEXAPRO) 10 MG TABLET    Take 1 tablet (10 mg total) by mouth daily    FLUTICASONE (FLONASE) 50 MCG/ACT NASAL SPRAY    2 sprays into each nostril daily Per nare daily    IBUPROFEN (MOTRIN) 800 MG TABLET    Take by mouth every 6 (six) hours as needed for mild pain    LEVALBUTEROL (XOPENEX) 0 31 MG/3 ML NEBULIZER SOLUTION    Take 1 ampule by nebulization every 4 (four) hours as needed for wheezing    LORATADINE 10 MG CAPS    Take by mouth    METFORMIN (GLUCOPHAGE-XR) 500 MG 24 HR TABLET    Take 2 tablets (1,000 mg total) by mouth daily with breakfast    OMEGA-3 FATTY ACIDS (FISH OIL) 1200 MG CPDR    Take 1,200 mg by mouth 2 daily    OXYBUTYNIN (DITROPAN-XL) 10 MG 24 HR TABLET    Take 1 tablet (10 mg total) by mouth daily at bedtime   Modified Medications    No medications on file   Discontinued Medications    No medications on file       Allergies   Allergen Reactions   • Bee Venom Anaphylaxis, Hives, Shortness Of Breath, Throat Swelling and Wheezing   • Pneumococcal Vaccines    • Other Rash and Sneezing     Beech, birch (rash), maple tree pollen  Dog and cat danger  There were no vitals taken for this visit  REVIEW OF SYSTEMS:  Constitutional: Negative  HEENT: Negative  Respiratory: Negative  Skin: Negative  Neurological: Negative  Psychiatric/Behavioral: Negative  Musculoskeletal: Negative except for that mentioned in the HPI  There were no vitals taken for this visit  Gen: Alert and oriented to person, place, time  HEENT: EOMI, eyes clear, moist mucus membranes, hearing intact  Respiratory: Bilateral chest rise   No audible wheezing found  Cardiovascular: Regular Rate and Rhythm  Abdomen: soft nontender/nondistended     PHYSICAL EXAM:   Right Ring Finger  TTP A1 pulley  Active triggering  Compartments soft  Brisk capillary refill  S/m intact median, radial, and ulnar nerve     IMAGING: No new images reviewed today  ASSESSMENT AND PLAN:  62 y o  female right ring finger trigger finger    Non-operative treatments were discussed with the patient in the forms of a physician directed home exercise program or a right ring finger A1 pulley corticosteroid injection  Surgical intervention of a right ring finger trigger finger release was discussed at length's with the patient at today's visit  The patient understands the risks and benefits of the procedure with risks including pain, stiffness, infection, neurovascular injury, recurrence of symptoms, failure of surgical procedure, inadvertent intraoperative complications, blood loss, blood clots, allergic reaction to anesthesia, stroke, heart attack, all up to and including to death  The patient understood and did consent for surgery today       Scribe Attestation    I,:  Daniel Hernandez am acting as a scribe while in the presence of the attending physician :       I,:  Jeffery Daniel personally performed the services described in this documentation    as scribed in my presence :

## 2022-12-21 NOTE — H&P
[address] [address]  [phone]       Herlinda Domínguez  [id number]  1965    ORTHOPAEDIC SURGERY OUTPATIENT NOTE  12/21/2022      HISTORY:  62 y o  female who presents the office today for an initial evaluation of her right ring finger  She states that over the past month, she has noticed locking and pain about her right ring finger  She states that its worse in the morning  She has been performing stretches about her finger which she states helps  Past Medical History:   Diagnosis Date   • Anxiety 9/16/2020   • Arthritis    • Asthma    • Class 2 obesity due to excess calories in adult 2/24/2021   • Contusion of ribs, right, initial encounter 9/22/2021   • Diabetes mellitus ([facility] )    • Functional urinary incontinence 9/14/2020   • Hypertension    • Moderate persistent asthma with acute exacerbation 9/16/2020   • Obesity    • Sleep apnea    • Type 2 diabetes mellitus without complication, without long-term current use of insulin ([facility] ) 9/16/2020   • Vitamin D deficiency        Past Surgical History:   Procedure Laterality Date   • COLONOSCOPY  01/01/2015    tiny polyp=next 2020   • CYST REMOVAL      thumb cyst   • HYSTEROSCOPY  01/01/2008    with biopsy   • MAMMO (HISTORICAL)  01/04/2016    normal=[facility]   • OTHER SURGICAL HISTORY  1998    tongue papiloma   • TONSILLECTOMY         Social History     Socioeconomic History   • Marital status: /Civil Union     Spouse name: Not on file   • Number of children: Not on file   • Years of education: Not on file   • Highest education level: Not on file   Occupational History   • Not on file   Tobacco Use   • Smoking status: Never   • Smokeless tobacco: Never   Vaping Use   • Vaping Use: Never used   Substance and Sexual Activity   • Alcohol use: Yes     Alcohol/week: 0 0 standard drinks     Comment: Three to four per month hard cider     • Drug use: Never   • Sexual activity: Not Currently     Partners: Male     Birth control/protection: Post-menopausal   Other Topics Concern   • Not on file   Social History Narrative    · Do you currently or have you served in the [employer]:   No      · Were you activated, into active duty, as a member of the [employer] or as a Reservist:   No      · Marital status:         · Exercise level:    Moderate      · Diet:   Regular      · General stress level:   Low       · Caffeine intake:   Occasional        · Guns present in home:   No      · Seat belts used routinely:   Yes      · Sunscreen used routinely:   Yes      · Advance directive:   No         Per bianca      Social Determinants of Health     Financial Resource Strain: Not on file   Food Insecurity: Not on file   Transportation Needs: Not on file   Physical Activity: Not on file   Stress: Not on file   Social Connections: Not on file   Intimate Partner Violence: Not on file   Housing Stability: Not on file       Family History   Problem Relation Age of Onset   • Diabetes Mother    • Hypertension Mother    • Depression Mother    • Arthritis Mother    • Hyperlipidemia Mother    • Heart attack Mother    • Rheum arthritis Mother    • Hypertension Father    • Depression Father    • Arthritis Father    • Rheum arthritis Father    • ADD / ADHD Father    • Anxiety disorder Father    • Depression Brother    • Arthritis Brother    • Cancer Brother 52        malignant neoplasm of lip, oral cavity, and pharynx   • Drug abuse Brother    • Substance Abuse Brother    • Bipolar disorder Brother    • Suicide Attempts Brother    • Breast cancer Maternal Aunt 30   • Breast cancer Maternal Aunt 30   • Breast cancer Maternal Aunt 72        Patient's Medications   New Prescriptions    No medications on file   Previous Medications    ACETAMINOPHEN (TYLENOL) 325 MG TABLET    Take 650 mg by mouth every 6 (six) hours as needed for mild pain    B COMPLEX VITAMINS TABLET Take 1 tablet by mouth daily    CHOLECALCIFEROL (VITAMIN D3) 1,000 UNITS TABLET    Take 1,000 Units by mouth daily 2000 units per tablet - takes 2 tabs daily    CHROMIUM PICOLINATE 1000 MCG TABS    Take by mouth Once daily    COLLAGEN PO    Take 20 mg by mouth Daily    EPINEPHRINE (EPIPEN JR) 0 15 MG/0 3 ML SOAJ    Inject 0 15 mg into a muscle once    ESCITALOPRAM (LEXAPRO) 10 MG TABLET    Take 1 tablet (10 mg total) by mouth daily    FLUTICASONE (FLONASE) 50 MCG/ACT NASAL SPRAY    2 sprays into each nostril daily Per nare daily    IBUPROFEN (MOTRIN) 800 MG TABLET    Take by mouth every 6 (six) hours as needed for mild pain    LEVALBUTEROL (XOPENEX) 0 31 MG/3 ML NEBULIZER SOLUTION    Take 1 ampule by nebulization every 4 (four) hours as needed for wheezing    LORATADINE 10 MG CAPS    Take by mouth    METFORMIN (GLUCOPHAGE-XR) 500 MG 24 HR TABLET    Take 2 tablets (1,000 mg total) by mouth daily with breakfast    OMEGA-3 FATTY ACIDS (FISH OIL) 1200 MG CPDR    Take 1,200 mg by mouth 2 daily    OXYBUTYNIN (DITROPAN-XL) 10 MG 24 HR TABLET    Take 1 tablet (10 mg total) by mouth daily at bedtime   Modified Medications    No medications on file   Discontinued Medications    No medications on file       Allergies   Allergen Reactions   • Bee Venom Anaphylaxis, Hives, Shortness Of Breath, Throat Swelling and Wheezing   • Pneumococcal Vaccines    • Other Rash and Sneezing     Beech, birch (rash), maple tree pollen  Dog and cat danger  There were no vitals taken for this visit  REVIEW OF SYSTEMS:  Constitutional: Negative  HEENT: Negative  Respiratory: Negative  Skin: Negative  Neurological: Negative  Psychiatric/Behavioral: Negative  Musculoskeletal: Negative except for that mentioned in the HPI  There were no vitals taken for this visit  Gen: Alert and oriented to person, place, time  HEENT: EOMI, eyes clear, moist mucus membranes, hearing intact  Respiratory: Bilateral chest rise   No audible wheezing found  Cardiovascular: Regular Rate and Rhythm  Abdomen: soft nontender/nondistended     PHYSICAL EXAM:   Right Ring Finger  TTP A1 pulley  Active triggering  Compartments soft  Brisk capillary refill  S/m intact median, radial, and ulnar nerve     IMAGING: No new images reviewed today  ASSESSMENT AND PLAN:  62 y o  female right ring finger trigger finger    Non-operative treatments were discussed with the patient in the forms of a physician directed home exercise program or a right ring finger A1 pulley corticosteroid injection  Surgical intervention of a right ring finger trigger finger release was discussed at length's with the patient at today's visit  The patient understands the risks and benefits of the procedure with risks including pain, stiffness, infection, neurovascular injury, recurrence of symptoms, failure of surgical procedure, inadvertent intraoperative complications, blood loss, blood clots, allergic reaction to anesthesia, stroke, heart attack, all up to and including to death  The patient understood and did consent for surgery today       Scribe Attestation    I,:  Frantz Silva am acting as a scribe while in the presence of the attending physician :       I,:  Brandie Haider personally performed the services described in this documentation    as scribed in my presence :

## 2022-12-23 ENCOUNTER — TELEPHONE (OUTPATIENT)
Dept: FAMILY MEDICINE CLINIC | Facility: CLINIC | Age: 57
End: 2022-12-23

## 2022-12-23 DIAGNOSIS — B97.7 HPV IN FEMALE: Primary | ICD-10-CM

## 2022-12-23 LAB
LAB AP GYN PRIMARY INTERPRETATION: NORMAL
Lab: NORMAL

## 2023-01-25 ENCOUNTER — TELEPHONE (OUTPATIENT)
Dept: OBGYN CLINIC | Facility: CLINIC | Age: 58
End: 2023-01-25

## 2023-01-25 RX ORDER — THIAMINE HCL 100 MG
TABLET ORAL
COMMUNITY

## 2023-01-25 NOTE — PRE-PROCEDURE INSTRUCTIONS
Pre-Surgery Instructions:   Medication Instructions   • acetaminophen (TYLENOL) 325 mg tablet Uses PRN- OK to take day of surgery   • ASPIRIN 81 PO Stop taking 7 days prior to surgery  • b complex vitamins tablet Stop taking 7 days prior to surgery  • cholecalciferol (VITAMIN D3) 1,000 units tablet Stop taking 7 days prior to surgery  • Chromium Picolinate 1000 MCG TABS Stop taking 7 days prior to surgery  • COLLAGEN PO Stop taking 7 days prior to surgery  • escitalopram (LEXAPRO) 10 mg tablet Take day of surgery  • fluticasone (FLONASE) 50 mcg/act nasal spray Take day of surgery  • ibuprofen (MOTRIN) 800 mg tablet Stop taking 3 days prior to surgery  • levalbuterol (XOPENEX) 0 31 mg/3 mL nebulizer solution Uses PRN- OK to take day of surgery   • Loratadine 10 MG CAPS Hold day of surgery  • Magnesium 500 MG TABS Stop taking 7 days prior to surgery  • metFORMIN (GLUCOPHAGE-XR) 500 mg 24 hr tablet Hold day of surgery  • Omega-3 Fatty Acids (Fish Oil) 1200 MG CPDR Stop taking 7 days prior to surgery  • oxybutynin (DITROPAN-XL) 10 MG 24 hr tablet Hold day of surgery  • VALERIAN ROOT PO Stop taking 7 days prior to surgery  Have you had / have a sore throat? No  Have you had / have a cough less than 1 week? No  Have you had / have a fever greater than 100 0 - 100  4? No  Are you experiencing any shortness of breath? No    Review with patient via phone medications and showering instructions  Instructed to avoid all ASA and OTC Vit/Supp 1 week prior to surgery and to avoid NSAIDs 3 days prior to surgery per anesthesia instructions  Tylenol ok to take prn  Advised ASC call with surgery schedule time, nothing eat or drink after midnight  Verbalized understanding

## 2023-01-25 NOTE — TELEPHONE ENCOUNTER
Pt called asking if her upcoming surgery with Dr Eleanor Cordoba is to be local or not  The OR board has it listed as choice  A message was sent to Dr Eleanor Cordoba with the patient's request to have a local or she may not be able to have the surgery  Told pt I would call back with his answer  Pt verbalized understanding

## 2023-01-26 ENCOUNTER — TELEPHONE (OUTPATIENT)
Dept: OBGYN CLINIC | Facility: CLINIC | Age: 58
End: 2023-01-26

## 2023-01-26 NOTE — TELEPHONE ENCOUNTER
S/w pt to let her know that Dr Carli Youngblood stated that he would do her surgery under local as per her request   I stated that a change request will be put in to reflect this  Pt verbalized understanding

## 2023-01-30 ENCOUNTER — TELEPHONE (OUTPATIENT)
Dept: OBGYN CLINIC | Facility: HOSPITAL | Age: 58
End: 2023-01-30

## 2023-01-30 NOTE — TELEPHONE ENCOUNTER
----- Message from Marie Queen MD sent at 7/14/2017  4:32 PM CDT -----  Please inform patient with regard to positive result and have him come in for treatment.    Thanks    SOURCE  URINE   Chlamydia Trachomatis by Nucleic Acid Amplification NEGATIVE  POSITIVE    Comments: Positive results are reported to the [facility] Department of Public Health. In accordance with CDC guidelines, all positive screening tests should be considered presumptive evidence of infection. However, in clinical settings where the prevalence of   infection is low, this assay has a significant false positive rate and additional corroborative testing should be considered.    Neisseria Gonorrhoeae by Nucleic Acid Amplification NEGATIVE  NEGATIVE        Caller: Patient    Doctor: Dr Brandie Osorio    Reason for call: Patient called stating that she had 2 options for anesthesia  Choice or local and it was changed to local and she wants to know how or if that will affect how she is taking her medications  Patient asking to speak to surgery scheduler or Preadmission testing      Call back#: [phone]

## 2023-02-06 ENCOUNTER — HOSPITAL ENCOUNTER (OUTPATIENT)
Facility: HOSPITAL | Age: 58
Setting detail: OUTPATIENT SURGERY
Discharge: HOME | End: 2023-02-06
Attending: ORTHOPAEDIC SURGERY | Admitting: ORTHOPAEDIC SURGERY

## 2023-02-06 VITALS
WEIGHT: 228 LBS | BODY MASS INDEX: 38.93 KG/M2 | SYSTOLIC BLOOD PRESSURE: 137 MMHG | HEIGHT: 64 IN | TEMPERATURE: 97.5 F | OXYGEN SATURATION: 97 % | RESPIRATION RATE: 18 BRPM | HEART RATE: 60 BPM | DIASTOLIC BLOOD PRESSURE: 74 MMHG

## 2023-02-06 DIAGNOSIS — M65.341 TRIGGER RING FINGER OF RIGHT HAND: Primary | ICD-10-CM

## 2023-02-06 RX ORDER — CHLORHEXIDINE GLUCONATE 4 G/100ML
SOLUTION TOPICAL DAILY PRN
Status: DISCONTINUED | OUTPATIENT
Start: 2023-02-06 | End: 2023-02-06 | Stop reason: HOSPADM

## 2023-02-06 RX ORDER — CEFAZOLIN SODIUM 2 G/50ML
2000 SOLUTION INTRAVENOUS ONCE
Status: DISCONTINUED | OUTPATIENT
Start: 2023-02-06 | End: 2023-02-06 | Stop reason: HOSPADM

## 2023-02-06 RX ORDER — CHLORHEXIDINE GLUCONATE 0.12 MG/ML
15 RINSE ORAL ONCE
Status: DISCONTINUED | OUTPATIENT
Start: 2023-02-06 | End: 2023-02-06 | Stop reason: HOSPADM

## 2023-02-06 RX ORDER — CEPHALEXIN 500 MG/1
500 CAPSULE ORAL EVERY 6 HOURS SCHEDULED
Qty: 8 CAPSULE | Refills: 0 | Status: SHIPPED | OUTPATIENT
Start: 2023-02-06 | End: 2023-02-08

## 2023-02-06 NOTE — DISCHARGE INSTR - AVS FIRST PAGE
Nalini Conner DO    Orthopedic Surgery, Shoulder/Elbow and Sports Medicine  [facility]   [address] [facility], [address] [facility]  Phone: [phone]    General Post-op Surgical Instructions:    Date of Procedure - 02/06/23     Procedure - Right Ring Trigger Finger Release    Weight Bearing Status - Please limit your weight bearing with the operative hand to <5lbs for the 1st week after surgery  DVT Prophylaxis and Duration - None required    PT/OT Instructions - Therapy will be discussed at your first postoperative visit  In the meantime, please make sure to move your wrist/fingers periodically throughout the day to prevent stiffness and help with swelling  Stitches/Staples - Removed at 7-10 days postop; we will then place steristrips on incision site    Wound Care - Please keep dressings clean, dry and intact for 3 days  After 3 days, you may remove your surgical dressing and apply clean Band-Aids to your incision  Additional Info - Please make sure to complete your course of antibiotics as instructed  Any questions or concerns please call [phone] please!

## 2023-02-06 NOTE — H&P
Patient Name:  Francisco Santana  MRN:  [id number]      Assessment & Plan     Right ring trigger finger  1  Plan right ring finger trigger release  Chief Complaint     Right ring finger pain    History of the Present Illness     Francisco Santana is a 62 y o  female with right ring finger trigger finger  She has failed conservative management and would like to proceed with right ring finger trigger release  Physical Exam     Wt 103 kg (227 lb)   BMI 39 27 kg/m²     Right Ring Finger  TTP A1 pulley  Active triggering  Compartments soft  Brisk capillary refill  S/m intact median, radial, and ulnar nerve        Eyes:  Anicteric sclerae  ENT:  Trachea midline  Lungs:  Clear to auscultation bilaterally  Heart:  Regular rate and rhythm with audible S1 and S2  Abdomen:  Soft and nontender  Lymph:  No palpable lymphadenopathy  Skin:  Intact without erythema  Neuro:  Sensation grossly intact to light touch  Psych:  Mood and affect are appropriate  Data Review     I have personally reviewed pertinent films in PACS, and my interpretation follows  None    I have personally reviewed pertinent lab results      Lab Results   Component Value Date    K 4 2 12/15/2022     12/15/2022    CO2 27 12/15/2022    BUN 14 12/15/2022    CREATININE 0 88 12/15/2022     Lab Results   Component Value Date    WBC 8 18 12/15/2022    HGB 12 9 12/15/2022     12/15/2022     No results found for: PT, INR, PTT    Past Medical History:   Diagnosis Date   • Anxiety 09/16/2020   • Arthritis    • Asthma    • Class 2 obesity due to excess calories in adult 02/24/2021   • Contusion of ribs, right, initial encounter 09/22/2021   • CPAP (continuous positive airway pressure) dependence    • [facility] )    • Functional urinary incontinence 09/14/2020   • Hypertension    • Kidney stone    • Moderate persistent asthma with acute exacerbation 09/16/2020   • Obesity    • Sleep apnea    • Type 2 diabetes mellitus without complication, without long-term current use of insulin ([facility] [address] ) 09/16/2020   • Vitamin D deficiency        Past Surgical History:   Procedure Laterality Date   • COLONOSCOPY  01/01/2015    tiny polyp=next 2020   • CYST REMOVAL      thumb cyst   • HYSTEROSCOPY  01/01/2008    with biopsy   • MAMMO (HISTORICAL)  01/04/2016    normal=[facility]   • OTHER SURGICAL HISTORY  1998    tongue papiloma   • TONSILLECTOMY         Allergies   Allergen Reactions   • Bee Venom Anaphylaxis, Hives, Shortness Of Breath, Throat Swelling and Wheezing   • Pneumococcal Vaccines    • Other Rash and Sneezing     Beech, birch (rash), maple tree pollen  Dog and cat danger  No current facility-administered medications on file prior to encounter       Current Outpatient Medications on File Prior to Encounter   Medication Sig Dispense Refill   • acetaminophen (TYLENOL) 325 mg tablet Take 650 mg by mouth every 6 (six) hours as needed for mild pain     • ASPIRIN 81 PO Take by mouth daily after breakfast     • b complex vitamins tablet Take 1 tablet by mouth daily     • cholecalciferol (VITAMIN D3) 1,000 units tablet Take 1,000 Units by mouth daily 2000 units per tablet - takes 2 tabs daily     • Chromium Picolinate 1000 MCG TABS Take by mouth Once daily     • COLLAGEN PO Take 20 mg by mouth Daily     • escitalopram (LEXAPRO) 10 mg tablet Take 1 tablet (10 mg total) by mouth daily 90 tablet 0   • fluticasone (FLONASE) 50 mcg/act nasal spray 2 sprays into each nostril daily Per nare daily     • ibuprofen (MOTRIN) 800 mg tablet Take by mouth daily after breakfast     • levalbuterol (XOPENEX) 0 31 mg/3 mL nebulizer solution Take 1 ampule by nebulization every 4 (four) hours as needed for wheezing     • Loratadine 10 MG CAPS Take by mouth daily after breakfast     • Magnesium 500 MG TABS Take by mouth daily after breakfast     • metFORMIN (GLUCOPHAGE-XR) 500 mg 24 hr tablet Take 2 tablets (1,000 mg total) by mouth daily with breakfast 180 tablet 0   • Omega-3 Fatty Acids (Fish Oil) 1200 MG CPDR Take 2,400 mg by mouth daily after breakfast 2 daily     • oxybutynin (DITROPAN-XL) 10 MG 24 hr tablet Take 1 tablet (10 mg total) by mouth daily at bedtime 90 tablet 0   • VALERIAN ROOT PO Take by mouth daily after breakfast     • EPINEPHrine (EPIPEN JR) 0 15 mg/0 3 mL SOAJ Inject 0 15 mg into a muscle once (Patient not taking: Reported on 6/16/2022)         Social History     Tobacco Use   • Smoking status: Never   • Smokeless tobacco: Never   Vaping Use   • Vaping Use: Never used   Substance Use Topics   • Alcohol use: Yes     Alcohol/week: 0 0 standard drinks     Comment: Three to four per month hard cider  • Drug use: Never       Family History   Problem Relation Age of Onset   • Diabetes Mother    • Hypertension Mother    • Depression Mother    • Arthritis Mother    • Hyperlipidemia Mother    • Heart attack Mother    • Rheum arthritis Mother    • Hypertension Father    • Depression Father    • Arthritis Father    • Rheum arthritis Father    • ADD / ADHD Father    • Anxiety disorder Father    • Depression Brother    • Arthritis Brother    • Cancer Brother 52        malignant neoplasm of lip, oral cavity, and pharynx   • Drug abuse Brother    • Substance Abuse Brother    • Bipolar disorder Brother    • Suicide Attempts Brother    • Breast cancer Maternal Aunt 30   • Breast cancer Maternal Aunt 30   • Breast cancer Maternal Aunt 65       Review of Systems     As stated in the HPI  All other systems were reviewed and are negative

## 2023-02-07 ENCOUNTER — OFFICE VISIT (OUTPATIENT)
Dept: OBGYN CLINIC | Facility: CLINIC | Age: 58
End: 2023-02-07

## 2023-02-07 VITALS
DIASTOLIC BLOOD PRESSURE: 80 MMHG | BODY MASS INDEX: 38.93 KG/M2 | HEIGHT: 64 IN | WEIGHT: 228 LBS | SYSTOLIC BLOOD PRESSURE: 150 MMHG

## 2023-02-07 DIAGNOSIS — B97.7 HPV (HUMAN PAPILLOMA VIRUS) INFECTION: Primary | ICD-10-CM

## 2023-02-07 NOTE — PROGRESS NOTES
Assessment/Plan:     There are no diagnoses linked to this encounter  40-year-old female  HPV positive/Pap smear negative  Nulliparous  Menopause at age 39  Pap/HPV -2021  Plan  Repeat Pap and HPV in 1 year  Condom if restart sexual activity  Plan explained and discussed with patient all patient questions answered and patient was satisfied      Subjective:      Patient ID: Clarisse Beebe is a 62 y o  female  HPI  63 yo female presents to the office today for consult from PCP secondary to abnormal Pap smear  Denies any complaint today    nullip   lmp at age 39  Denies any hot  Flashes or night sweat  Last sexual activity 3 y ago     Pap smear results reviewed and discussed with patient finding of HPV positive with normal Pap smear  Reviewed and discussed with patient as patient last year has negative Pap smear with HPV negative based on the ASC CP recommendation recommend to repeat Pap and HPV in 1 year patient aware if HPV still positive or Pap is abnormal then I would consider colposcopy  Plan explained and discussed with patient in details all patient question answered and patient was satisfied    The following portions of the patient's history were reviewed and updated as appropriate: allergies, current medications, past family history, past medical history, past social history, past surgical history and problem list     Review of Systems      Objective:      /80 (BP Location: Left arm, Patient Position: Sitting, Cuff Size: Adult)   Ht 5' 4" (1 626 m)   Wt 103 kg (228 lb)   BMI 39 14 kg/m²          Physical Exam  Constitutional:       Appearance: Normal appearance  Neurological:      General: No focal deficit present  Mental Status: She is alert and oriented to person, place, and time     Psychiatric:         Mood and Affect: Mood normal          Behavior: Behavior normal

## 2023-02-07 NOTE — OP NOTE
OPERATIVE REPORT  PATIENT NAME: Khadijah Solorzano    :  1965  MRN: [id number]  Pt Location: [facility] OR ROOM 01    SURGERY DATE: 2023    Surgeon(s) and Role:     * Nalini Conner - Primary     * Aj Armando MD - Assisting    Preop Diagnosis:  Trigger ring finger of right hand [M65 341]    Post-Op Diagnosis Codes:     * Trigger ring finger of right hand [M65 341]    Procedure(s):  Right - RELEASE TRIGGER FINGER-right ring finger    Specimen(s):  * No specimens in log *    Estimated Blood Loss:   Minimal    Drains:  * No LDAs found *    Anesthesia Type:   Local    Operative Indications:  Trigger ring finger of right hand [M65 341]      Operative Findings: Thickened A1 pulley    Complications:   None    Procedure and Technique:     INDICATIONS AND HISTORY:  This is a 62 y o  female  with right ring finger trigger finger  Patient failed conservative treatment and wished to have surgical intervention  Patient understood risks, benefits of procedure with risks including pain, recurrence of symptoms, neurovascular injury, tendon injury, infection, wound dehiscence, blood loss, blood clots, stroke, heart attack, all up to including death  Patient understood and did consent for surgery today  DESCRIPTION OF OPERATION:  Patient was seen in pre-op holding area, where the operative extremity was marked  Procedure verified  The patient was taken to operating room, placed in the supine position  The operative upper extremity was prepped and draped in usual sterile fashion  A time-out was called  The patient was administered Ancef 2 gm IV prior to incision  Using a 15-blade knife, incision was made over the A1 pulley  Subcutaneous dissection was taken  down to reveal the A1 pulley over the flexor tendon sheath  The A1 pulley was incised and released proximally and distally using a tenotomy scissors    Once the A1 pulley was fully released, the flexor tendon was fully elevated out of the wound, demonstrating full release of the A1 pulley  The digit was ranged passively in flexion and extension and demonstrated no locking or catching  The tourniquet was taken down  Hemostasis was undertaken with bipolar cautery  The wound was copiously irrigated  The wound was then closed with 3-0 nylon  Dressings include Xeroform, 2 x 2 gauze, Christian, and Ace bandage  The patient tolerated the procedure well  There was no complication throughout the case  Patient was placed in PACU in stable condition  No qualified resident was available for the procedure      I was present for the entire procedure    Patient Disposition:  PACU         SIGNATURE: Nalini Conner  DATE: February 7, 2023  TIME: 7:29 AM

## 2023-02-16 ENCOUNTER — OFFICE VISIT (OUTPATIENT)
Dept: OBGYN CLINIC | Facility: CLINIC | Age: 58
End: 2023-02-16

## 2023-02-16 VITALS — SYSTOLIC BLOOD PRESSURE: 137 MMHG | DIASTOLIC BLOOD PRESSURE: 82 MMHG | HEART RATE: 67 BPM

## 2023-02-16 DIAGNOSIS — Z47.89 AFTERCARE FOLLOWING SURGERY OF THE MUSCULOSKELETAL SYSTEM: Primary | ICD-10-CM

## 2023-02-16 NOTE — PROGRESS NOTES
[address] [facility]  [address] [facility] [address]  [phone]       Atul Kumari  [id number]  1965    ORTHOPAEDIC SURGERY OUTPATIENT NOTE  2/16/2023      HISTORY:  62 y o  female presents to the clinic for postop evaluation status post right ring trigger finger release performed on 2/6/2023  She is doing well  She denies triggering of the finger  No numbness or tingling in the hand      Past Medical History:   Diagnosis Date   • Anxiety 09/16/2020   • Arthritis    • Asthma    • Class 2 obesity due to excess calories in adult 02/24/2021   • Contusion of ribs, right, initial encounter 09/22/2021   • CPAP (continuous positive airway pressure) dependence    • Diabetes mellitus ([facility] [address] )    • Functional urinary incontinence 09/14/2020   • Hypertension    • Kidney stone    • Moderate persistent asthma with acute exacerbation 09/16/2020   • Obesity    • Sleep apnea    • Type 2 diabetes mellitus without complication, without long-term current use of insulin ([facility] [address] ) 09/16/2020   • Vitamin D deficiency        Past Surgical History:   Procedure Laterality Date   • COLONOSCOPY  01/01/2015    tiny polyp=next 2020   • CYST REMOVAL      thumb cyst   • HYSTEROSCOPY  01/01/2008    with biopsy   • MAMMO (HISTORICAL)  01/04/2016    normal=[facility]   • OTHER SURGICAL HISTORY  1998    tongue papiloma   • OR TENDON SHEATH INCISION Right 2/6/2023    Procedure: RELEASE TRIGGER FINGER-right ring finger;  Surgeon: Caesar Sue;  Location: [facility]  Service: Orthopedics   • TONSILLECTOMY         Social History     Socioeconomic History   • Marital status: /Civil Union     Spouse name: Not on file   • Number of children: Not on file   • Years of education: Not on file   • Highest education level: Not on file   Occupational History   • Not on file   Tobacco Use   • Smoking status: Never   • Smokeless tobacco: Never   Vaping Use   • Vaping Use: Never used   Substance and Sexual Activity   • Alcohol use: Yes     Alcohol/week: 0 0 standard drinks     Comment: Three to four per month hard cider  • Drug use: Never   • Sexual activity: Not Currently     Partners: Male     Birth control/protection: Post-menopausal   Other Topics Concern   • Not on file   Social History Narrative    · Do you currently or have you served in the [employer]:   No      · Were you activated, into active duty, as a member of the [employer] or as a Reservist:   No      · Marital status:         · Exercise level:    Moderate      · Diet:   Regular      · General stress level:   Low       · Caffeine intake:   Occasional        · Guns present in home:   No      · Seat belts used routinely:   Yes      · Sunscreen used routinely:   Yes      · Advance directive:   No         Per bianca      Social Determinants of Health     Financial Resource Strain: Not on file   Food Insecurity: Not on file   Transportation Needs: Not on file   Physical Activity: Not on file   Stress: Not on file   Social Connections: Not on file   Intimate Partner Violence: Not on file   Housing Stability: Not on file       Family History   Problem Relation Age of Onset   • Diabetes Mother    • Hypertension Mother    • Depression Mother    • Arthritis Mother    • Hyperlipidemia Mother    • Heart attack Mother    • Rheum arthritis Mother    • Hypertension Father    • Depression Father    • Arthritis Father    • Rheum arthritis Father    • ADD / ADHD Father    • Anxiety disorder Father    • Depression Brother    • Arthritis Brother    • Cancer Brother 52        malignant neoplasm of lip, oral cavity, and pharynx   • Drug abuse Brother    • Substance Abuse Brother    • Bipolar disorder Brother    • Suicide Attempts Brother    • Breast cancer Maternal Aunt 30   • Breast cancer Maternal Aunt 30   • Breast cancer Maternal Aunt 72        Patient's Medications   New Prescriptions    No medications on file Previous Medications    ACETAMINOPHEN (TYLENOL) 325 MG TABLET    Take 650 mg by mouth every 6 (six) hours as needed for mild pain    ASPIRIN 81 PO    Take by mouth daily after breakfast    B COMPLEX VITAMINS TABLET    Take 1 tablet by mouth daily    CHOLECALCIFEROL (VITAMIN D3) 1,000 UNITS TABLET    Take 1,000 Units by mouth daily 2000 units per tablet - takes 2 tabs daily    CHROMIUM PICOLINATE 1000 MCG TABS    Take by mouth Once daily    COLLAGEN PO    Take 20 mg by mouth Daily    EPINEPHRINE (EPIPEN JR) 0 15 MG/0 3 ML SOAJ    Inject 0 15 mg into a muscle once    ESCITALOPRAM (LEXAPRO) 10 MG TABLET    Take 1 tablet (10 mg total) by mouth daily    FLUTICASONE (FLONASE) 50 MCG/ACT NASAL SPRAY    2 sprays into each nostril daily Per nare daily    IBUPROFEN (MOTRIN) 800 MG TABLET    Take by mouth daily after breakfast    LEVALBUTEROL (XOPENEX) 0 31 MG/3 ML NEBULIZER SOLUTION    Take 1 ampule by nebulization every 4 (four) hours as needed for wheezing    LORATADINE 10 MG CAPS    Take by mouth daily after breakfast    MAGNESIUM 500 MG TABS    Take by mouth daily after breakfast    METFORMIN (GLUCOPHAGE-XR) 500 MG 24 HR TABLET    Take 2 tablets (1,000 mg total) by mouth daily with breakfast    OMEGA-3 FATTY ACIDS (FISH OIL) 1200 MG CPDR    Take 2,400 mg by mouth daily after breakfast 2 daily    OXYBUTYNIN (DITROPAN-XL) 10 MG 24 HR TABLET    Take 1 tablet (10 mg total) by mouth daily at bedtime    VALERIAN ROOT PO    Take by mouth daily after breakfast   Modified Medications    No medications on file   Discontinued Medications    No medications on file       Allergies   Allergen Reactions   • Bee Venom Anaphylaxis, Hives, Shortness Of Breath, Throat Swelling and Wheezing   • Pneumococcal Vaccines Hives     Anaphylaxis as per pt   • Other Rash and Sneezing     Beech, birch (rash), maple tree pollen  Dog and cat danger  There were no vitals taken for this visit  REVIEW OF SYSTEMS:  Constitutional: Negative  HEENT: Negative  Respiratory: Negative  Skin: Negative  Neurological: Negative  Psychiatric/Behavioral: Negative  Musculoskeletal: Negative except for that mentioned in the HPI  There were no vitals taken for this visit  Gen: No acute distress, resting comfortably in bed  HEENT: Eyes clear, moist mucus membranes, hearing intact  Respiratory: No audible wheezing or stridor  Cardiovascular: Well Perfused peripherally, 2+ distal pulse  Abdomen: nondistended, no peritoneal signs     PHYSICAL EXAM:    Right ring finger:  Wound clean dry and intact sutures removed  No triggering over the flexor tendon  Sensation tact  Brisk cap refill    IMAGING: None    ASSESSMENT AND PLAN:  62 y o  female status post right ring trigger finger release performed on 2/6/2023  She is doing well  Sutures removed  She may shower but do not soak or scrub the incision  No neosporin or Telfa She may use the hand as tolerated to protect the wound  We will see her back as needed

## 2023-06-13 ENCOUNTER — TELEPHONE (OUTPATIENT)
Dept: FAMILY MEDICINE CLINIC | Facility: CLINIC | Age: 58
End: 2023-06-13

## 2023-06-13 NOTE — TELEPHONE ENCOUNTER
Patient called she has an appointment next Monday to establish care   Do you want her to get lab work done before the appointment

## 2023-06-15 ENCOUNTER — RA CDI HCC (OUTPATIENT)
Dept: OTHER | Facility: HOSPITAL | Age: 58
End: 2023-06-15

## 2023-06-15 NOTE — PROGRESS NOTES
[facility]  coding opportunities          Chart Reviewed number of suggestions sent to Provider: 1     Patients Insurance        Commercial Insurance: [facility]       [id number]:  Moderate persistent asthma with (acute) exacerbation [[id number]]    Found in active problem list - please review and assess and document per MEAT if applicable for 1012

## 2023-06-19 ENCOUNTER — OFFICE VISIT (OUTPATIENT)
Dept: FAMILY MEDICINE CLINIC | Facility: CLINIC | Age: 58
End: 2023-06-19
Payer: COMMERCIAL

## 2023-06-19 VITALS
HEIGHT: 64 IN | BODY MASS INDEX: 39.71 KG/M2 | SYSTOLIC BLOOD PRESSURE: 140 MMHG | DIASTOLIC BLOOD PRESSURE: 92 MMHG | RESPIRATION RATE: 16 BRPM | HEART RATE: 82 BPM | WEIGHT: 232.6 LBS | OXYGEN SATURATION: 96 %

## 2023-06-19 DIAGNOSIS — J45.41 MODERATE PERSISTENT ASTHMA WITH ACUTE EXACERBATION: ICD-10-CM

## 2023-06-19 DIAGNOSIS — G47.33 OSA ON CPAP: ICD-10-CM

## 2023-06-19 DIAGNOSIS — F41.9 ANXIETY: ICD-10-CM

## 2023-06-19 DIAGNOSIS — Z99.89 OSA ON CPAP: ICD-10-CM

## 2023-06-19 DIAGNOSIS — R39.81 FUNCTIONAL URINARY INCONTINENCE: ICD-10-CM

## 2023-06-19 DIAGNOSIS — E55.9 VITAMIN D DEFICIENCY: ICD-10-CM

## 2023-06-19 DIAGNOSIS — E11.9 TYPE 2 DIABETES MELLITUS WITHOUT COMPLICATION, WITHOUT LONG-TERM CURRENT USE OF INSULIN (HCC): Primary | ICD-10-CM

## 2023-06-19 DIAGNOSIS — R53.83 OTHER FATIGUE: ICD-10-CM

## 2023-06-19 PROCEDURE — 99214 OFFICE O/P EST MOD 30 MIN: CPT | Performed by: FAMILY MEDICINE

## 2023-06-19 RX ORDER — OXYBUTYNIN CHLORIDE 10 MG/1
10 TABLET, EXTENDED RELEASE ORAL
Qty: 90 TABLET | Refills: 0 | Status: SHIPPED | OUTPATIENT
Start: 2023-06-19 | End: 2023-09-17

## 2023-06-19 RX ORDER — ESCITALOPRAM OXALATE 10 MG/1
10 TABLET ORAL DAILY
Qty: 90 TABLET | Refills: 0 | Status: SHIPPED | OUTPATIENT
Start: 2023-06-19 | End: 2023-09-17

## 2023-06-19 RX ORDER — METFORMIN HYDROCHLORIDE 500 MG/1
1000 TABLET, EXTENDED RELEASE ORAL
Qty: 180 TABLET | Refills: 0 | Status: SHIPPED | OUTPATIENT
Start: 2023-06-19 | End: 2023-09-17

## 2023-06-19 NOTE — ASSESSMENT & PLAN NOTE
Lab Results   Component Value Date    HGBA1C 6 6 (H) 12/15/2022   discussed  Diet  Exercise  And life  Style  Modifications   She is up  To  Date   With  Eye  Exam  Foot  Exam normal today

## 2023-06-20 NOTE — PATIENT INSTRUCTIONS
Type 2 Diabetes Management for Adults   AMBULATORY CARE:   Type 2 diabetes  is a disease that affects how your body uses glucose (sugar)  Either your body cannot make enough insulin, or it cannot use the insulin correctly  It is important to keep diabetes controlled to prevent damage to your heart, blood vessels, and other organs  Management will help you feel well and enjoy your daily activities  Your diabetes care team providers can help you make a plan to fit diabetes care into your schedule  Your plan can change over time to fit your needs and your family's needs  Have someone call your local emergency number (911 in the [address]) if:   • You cannot be woken  • You have signs of diabetic ketoacidosis:     ? confusion, fatigue    ? vomiting    ? rapid heartbeat    ? fruity smelling breath    ? extreme thirst    ? dry mouth and skin    • You have any of the following signs of a heart attack:      ? Squeezing, pressure, or pain in your chest    ? You may  also have any of the following:     - Discomfort or pain in your back, neck, jaw, stomach, or arm    - Shortness of breath    - Nausea or vomiting    - Lightheadedness or a sudden cold sweat    • You have any of the following signs of a stroke:      ? Numbness or drooping on one side of your face     ? Weakness in an arm or leg    ? Confusion or difficulty speaking    ? Dizziness, a severe headache, or vision loss    Call your doctor or diabetes care team provider if:   • You have a sore or wound that will not heal     • You have a change in the amount you urinate  • Your blood sugar levels are higher than your target goals  • You often have lower blood sugar levels than your target goals  • Your skin is red, dry, warm, or swollen  • You have trouble coping with diabetes, or you feel anxious or depressed  • You have questions or concerns about your condition or care      What you need to know about high blood sugar levels:  High blood sugar levels may not cause any symptoms  You may feel more thirsty or urinate more often than usual  Over time, high blood sugar levels can damage your nerves, blood vessels, tissues, and organs  The following can increase your blood sugar levels:  • Large meals or large amounts of carbohydrates at one time    • Less physical activity    • Stress    • Illness    • A lower dose of diabetes medicine or insulin, or a late dose    What you need to know about low blood sugar levels:  Symptoms include feeling shaky, dizzy, irritable, or confused  You can prevent symptoms by keeping your blood sugar levels from going too low  • Treat a low blood sugar level right away:      ? Drink 4 ounces of juice or have 1 tube of glucose gel  ? Check your blood sugar level again 10 to 15 minutes later  ? When the level goes back to normal, eat a meal or snack to prevent another decrease  • Keep glucose gel, raisins, or hard candy with you at all times to treat a low blood sugar level  • Your blood sugar level can get too low if you take diabetes medicine or insulin and do not eat enough food  • If you use insulin, check your blood sugar level before you exercise  ? If your blood sugar level is below 100 mg/dL, eat 4 crackers or 2 ounces of raisins, or drink 4 ounces of juice  ? Check your level every 30 minutes if you exercise longer than 1 hour  ? You may need a snack during or after exercise  What you can do to manage your blood sugar levels:   • Check your blood sugar levels as directed and as needed  Several items are available to use to check your levels  You may need to check by testing a drop of blood in a glucose monitor  You may instead be given a continuous glucose monitoring (CGM) device  The device is worn at all times  The CGM checks your blood sugar level every 5 minutes  It sends results to an electronic device such as a smart phone  A CGM can be used with or without an insulin pump   You and your diabetes care team providers will decide on the best method for you  The goal for blood sugar levels before meals  is between 80 and 130 mg/dL and 2 hours after eating  is lower than 180 mg/dL  • Make healthy food choices  Work with a dietitian to develop a meal plan that works for you and your schedule  A dietitian can help you learn how to eat the right amount of carbohydrates during your meals and snacks  Carbohydrates can raise your blood sugar level if you eat too many at one time  Examples of foods that contain carbohydrates are breads, cereals, rice, pasta, and sweets  • Eat high-fiber foods as directed  Fiber helps improve blood sugar levels  Fiber also lowers your risk for heart disease and other problems diabetes can cause  Examples of high-fiber foods include vegetables, whole-grain bread, and beans such as roberts beans  Your dietitian can tell you how much fiber to have each day  • Get regular physical activity  Physical activity can help you get to your target blood sugar level goal and manage your weight  Get at least 150 minutes of moderate to vigorous aerobic physical activity each week  Do not miss more than 2 days in a row  Do not sit longer than 30 minutes at a time  Your healthcare provider can help you create an activity plan  The plan can include the best activities for you and can help you build your strength and endurance  • Maintain a healthy weight  Ask your team what a healthy weight is for you  A healthy weight can help you control diabetes and prevent heart disease  Ask your team to help you create a weight loss plan, if needed  Weight loss can help make a difference in managing diabetes  Your team will help you set a weight-loss goal, such as 10 to 15 pounds, or 5% of your extra weight  Together you and your team can set manageable weight loss goals  • Take your diabetes medicine or insulin as directed    You may need diabetes medicine, insulin, or both to help control your blood sugar levels  Your healthcare provider will teach you how and when to take your diabetes medicine or insulin  You will also be taught about side effects oral diabetes medicine can cause  Insulin may be injected or given through a pump or pen  You and your providers will decide on the best method for you:    ? An insulin pump  is an implanted device that gives your insulin 24 hours a day  An insulin pump prevents the need for multiple insulin injections in a day  ? An insulin pen  is a device prefilled with the right amount of insulin  ? You and your family members will be taught how to draw up and give insulin  if this is the best method for you  Your providers will also teach you how to dispose of needles and syringes  ? You will learn how much insulin you need  and when to give it  You will be taught when not to give insulin  You will also be taught what to do if your blood sugar level drops too low  This may happen if you take insulin and do not eat the right amount of carbohydrates  More ways to manage type 2 diabetes:   • Wear medical alert identification  Wear medical alert jewelry or carry a card that says you have diabetes  Ask your provider where to get these items  • Do not smoke  Nicotine and other chemicals in cigarettes and cigars can cause lung and blood vessel damage  It also makes it more difficult to manage your diabetes  Ask your provider for information if you currently smoke and need help to quit  Do not use e-cigarettes or smokeless tobacco in place of cigarettes or to help you quit  They still contain nicotine  • Check your feet each day for cuts, scratches, calluses, or other wounds  Look for redness and swelling, and feel for warmth  Wear shoes that fit well  Check your shoes for rocks or other objects that can hurt your feet  Do not walk barefoot or wear shoes without socks   Wear cotton socks to help keep your feet dry  • Ask about vaccines you may need  You have a higher risk for serious illness if you get the flu, pneumonia, COVID-19, or hepatitis  Ask your provider if you should get vaccines to prevent these or other diseases, and when to get the vaccines  • Talk to your provider if you become stressed about diabetes care  Sometimes being able to fit diabetes care into your life can cause increased stress  The stress can cause you not to take care of yourself properly  Your care team providers can help by offering tips about self-care  Your providers may suggest you talk to a mental health provider who can listen and offer help with self-care issues  • Have your A1c checked as directed  Your provider may check your A1c every 3 months, or 2 times each year if your diabetes is controlled  An A1c test shows the average amount of sugar in your blood over the past 2 to 3 months  Your provider will tell you what your A1c level should be  • Have screening tests as directed  Your provider may recommend screening for complications of diabetes and other conditions that may develop  Some screenings may begin right away and some may happen within the first 5 years of diagnosis:    ? Examples of diabetes complications  include kidney problems, high cholesterol, high blood pressure, blood vessel problems, eye problems, and sleep apnea  ? You may be screened for a low vitamin B level  if you take oral diabetes medicine for a long time  ? Women of childbearing years may be screened  for polycystic ovarian syndrome (PCOS)  Follow up with your doctor or diabetes care team providers as directed: You may need to have blood tests done before your follow-up visit  The test results will show if changes need to be made in your treatment or self-care  Talk to your provider if you cannot afford your medicine  Write down your questions so you remember to ask them during your visits    © Copyright [employer] 2022 Information is for End User's use only and may not be sold, redistributed or otherwise used for commercial purposes  The above information is an  only  It is not intended as medical advice for individual conditions or treatments  Talk to your doctor, nurse or pharmacist before following any medical regimen to see if it is safe and effective for you  What to Do if Your Blood Sugar is Low   AMBULATORY CARE:   Low blood sugar levels  (hypoglycemia) can happen with Type 1 and Type 2 diabetes  Low levels are more likely to happen if you use insulin  Hypoglycemia can cause you to have falls, accidents, and injuries  A blood sugar level that gets too low can lead to seizures, coma, and death  Learn to recognize the symptoms early so you can get treatment quickly  When your blood sugar is low you may feel:  • Sweaty    • Nervous or shaky    • Anxious or irritable    • Confused    • A fast, pounding heartbeat    • Extremely hungry    Have someone call your local emergency number (911 in the [address] [facility]) if:   • You cannot be woken  • You have a seizure  Call your doctor if:   • You have symptoms of a low blood sugar level, such as trouble thinking, sweating, or a pounding heartbeat  • Your blood sugar level is lower than normal and it does not improve with treatment  • You often have lower blood sugar levels than your target goals  • You have trouble coping with your illness, or you feel anxious or depressed  • You have questions or concerns about your condition or care  What to do if you have symptoms of low blood sugar:   • Check your blood sugar level, if possible  Your blood sugar level is too low if it is at or below 70 mg/dL  • Eat or drink 15 grams of fast-acting carbohydrate  Fast-acting carbohydrates will raise your blood sugar level quickly  Examples of 15 grams of fast-acting carbohydrates:     ? 4 ounces (½ cup) of fruit juice     ? 4 ounces of regular soda    ?  2 tablespoons of raisins     ? 1 tube of glucose gel or 3 to 4 glucose tablets       • Check your blood sugar level 15 minutes later  If the level is still low (less than 100 mg/dL), eat another 15 grams of carbohydrate  When the level returns to 100 mg/dL, eat a snack or meal that contains carbohydrates  This will help prevent another drop in blood sugar  • Teach people close to you how to use your glucagon kit  Your blood sugar may be too low for you to be awake  People need to know when and how to use your kit  Prevent low blood sugar levels:  Prevent low blood sugar by knowing what increases your risk  Ask your healthcare provider for ways to prevent low blood sugar levels  Any of the following can increase your risk of low blood sugar:  • Fasting for tests or procedures    • During or after intense exercise    • Late or postponed meals    • Sleeping (you may need a bedtime snack)     • Drinking alcohol if you use insulin or insulin releasing pills    Follow up with your doctor as directed:  Write down your questions so you remember to ask them during your visits  © Copyright Tollie Hammans 2022 Information is for End User's use only and may not be sold, redistributed or otherwise used for commercial purposes  The above information is an  only  It is not intended as medical advice for individual conditions or treatments  Talk to your doctor, nurse or pharmacist before following any medical regimen to see if it is safe and effective for you

## 2023-06-20 NOTE — PROGRESS NOTES
Diabetic Foot Exam    Patient's shoes and socks removed  Right Foot/Ankle   Right Foot Inspection  Skin Exam: skin normal and skin intact  No dry skin, no warmth, no callus, no erythema, no maceration, no abnormal color, no pre-ulcer, no ulcer and no callus  Toe Exam: ROM and strength within normal limits  No swelling, no tenderness, erythema and  no right toe deformity    Sensory   Vibration: intact  Proprioception: intact  Monofilament testing: intact    Vascular  The right DP pulse is 2+  The right PT pulse is 2+  Left Foot/Ankle  Left Foot Inspection  Skin Exam: skin normal and skin intact  No dry skin, no warmth, no erythema, no maceration, normal color, no pre-ulcer, no ulcer and no callus  Toe Exam: No swelling, no tenderness, no erythema and no left toe deformity  Sensory   Vibration: intact  Proprioception: intact  Monofilament testing: intact    Vascular  The left DP pulse is 2+  The left PT pulse is 2+       Assign Risk Category  No deformity present  No loss of protective sensation  No weak pulses  Risk: 0

## 2023-06-20 NOTE — PROGRESS NOTES
BMI Counseling: Body mass index is 40 24 kg/m²  The BMI is above normal  Nutrition recommendations include decreasing portion sizes, encouraging healthy choices of fruits and vegetables, decreasing fast food intake, consuming healthier snacks, limiting drinks that contain sugar, moderation in carbohydrate intake and reducing intake of saturated and trans fat  Rationale for BMI follow-up plan is due to patient being overweight or obese  Depression Screening and Follow-up Plan: Patient was screened for depression during today's encounter  They screened negative with a PHQ-2 score of 0

## 2023-06-20 NOTE — PROGRESS NOTES
Assessment/Plan:came in  To  Get  Established    She  Is  Dr Sugey Nava  Patient    No  New  Complaints          Problem List Items Addressed This Visit        Endocrine    Type 2 diabetes mellitus without complication, without long-term current use of insulin ([facility] [address] ) - Primary       Lab Results   Component Value Date    HGBA1C 6 6 (H) 12/15/2022   discussed  Diet  Exercise  And life  Style  Modifications   She is up  To  Date   With  Eye  Exam  Foot  Exam normal today          Relevant Medications    metFORMIN (GLUCOPHAGE-XR) 500 mg 24 hr tablet    Other Relevant Orders    CBC and differential    Comprehensive metabolic panel    Lipid panel    HEMOGLOBIN A1C W/ EAG ESTIMATION    UA (URINE) with reflex to Scope       Respiratory    Moderate persistent asthma with acute exacerbation     Stable  now         NICKY on CPAP     Uses  c pap  Doing well            Other    Functional urinary incontinence     Much  Stable no  Sob  Or  wheezing         Relevant Medications    oxybutynin (DITROPAN-XL) 10 MG 24 hr tablet    Anxiety     Stable  Doing well on  lexapro         Relevant Medications    escitalopram (LEXAPRO) 10 mg tablet   Other Visit Diagnoses     Other fatigue        Relevant Orders    TSH, 3rd generation    Vitamin D deficiency        Relevant Orders    VITAMIN D, 25 HYDROXY, TOTAL            Subjective:      Patient ID: Carmelina Qiu is a 62 y o  female      HPI-  Came  In  To  Get  Established   She  Ha s dm2  stable she  Says  She is  Gaining  Weight  But  Will  Start  Working on  That   She  Has  Anxiety  Stable on  meds  No  New  complaints    The following portions of the patient's history were reviewed and updated as appropriate:   Past Medical History:  She has a past medical history of Anxiety (09/16/2020), Arthritis, Asthma, Class 2 obesity due to excess calories in adult (02/24/2021), Contusion of ribs, right, initial encounter (09/22/2021), CPAP (continuous positive airway pressure) dependence, Diabetes mellitus ([facility] [address] ), Functional urinary incontinence (09/14/2020), Hypertension, Kidney stone, Moderate persistent asthma with acute exacerbation (09/16/2020), Obesity, Sleep apnea, Type 2 diabetes mellitus without complication, without long-term current use of insulin ([facility] [address] ) (09/16/2020), and Vitamin D deficiency  ,  _______________________________________________________________________  Medical Problems:  does not have any pertinent problems on file ,  _______________________________________________________________________  Past Surgical History:   has a past surgical history that includes Tonsillectomy; Colonoscopy (01/01/2015); Hysteroscopy (01/01/2008); Other surgical history (1998); Cyst Removal; Mammo (historical) (01/04/2016); and pr tendon sheath incision (Right, 2/6/2023)  ,  _______________________________________________________________________  Family History:  family history includes ADD / ADHD in her father; Anxiety disorder in her father; Arthritis in her brother, father, and mother; Bipolar disorder in her brother; Breast cancer (age of onset: 27) in her maternal aunt and maternal aunt; Breast cancer (age of onset: 72) in her maternal aunt; Cancer (age of onset: 52) in her brother; Depression in her brother, father, and mother; Diabetes in her mother; Drug abuse in her brother; Heart attack in her mother; Hyperlipidemia in her mother; Hypertension in her father and mother; Rheum arthritis in her father and mother; Substance Abuse in her brother; Suicide Attempts in her brother ,  _______________________________________________________________________  Social History:   reports that she has never smoked  She has never used smokeless tobacco  She reports current alcohol use  She reports that she does not use drugs  ,  _______________________________________________________________________  Allergies:  is allergic to bee venom, pneumococcal vaccines, and other   _______________________________________________________________________  Current Outpatient Medications   Medication Sig Dispense Refill   • acetaminophen (TYLENOL) 325 mg tablet Take 650 mg by mouth every 6 (six) hours as needed for mild pain     • ASPIRIN 81 PO Take by mouth daily after breakfast     • b complex vitamins tablet Take 1 tablet by mouth daily     • cholecalciferol (VITAMIN D3) 1,000 units tablet Take 10,000 Units by mouth daily     • Chromium Picolinate 1000 MCG TABS Take by mouth Once daily     • COLLAGEN PO Take 20 mg by mouth Daily     • EPINEPHrine (EPIPEN JR) 0 15 mg/0 3 mL SOAJ Inject 0 3 mg into a muscle once     • escitalopram (LEXAPRO) 10 mg tablet Take 1 tablet (10 mg total) by mouth daily 90 tablet 0   • fluticasone (FLONASE) 50 mcg/act nasal spray 2 sprays into each nostril daily Per nare daily     • levalbuterol (XOPENEX) 0 31 mg/3 mL nebulizer solution Take 1 ampule by nebulization every 4 (four) hours as needed for wheezing     • Loratadine 10 MG CAPS Take by mouth daily after breakfast     • Magnesium 500 MG TABS Take by mouth daily after breakfast     • metFORMIN (GLUCOPHAGE-XR) 500 mg 24 hr tablet Take 2 tablets (1,000 mg total) by mouth daily with breakfast 180 tablet 0   • NON FORMULARY Take by mouth in the morning Beet root,     • Omega-3 Fatty Acids (Fish Oil) 1200 MG CPDR Take 2,400 mg by mouth daily after breakfast 2 daily     • oxybutynin (DITROPAN-XL) 10 MG 24 hr tablet Take 1 tablet (10 mg total) by mouth daily at bedtime 90 tablet 0     No current facility-administered medications for this visit      _______________________________________________________________________  Review of Systems   Constitutional: Negative for fatigue and fever  HENT: Negative for congestion and postnasal drip  Eyes: Negative for pain, discharge and itching  Respiratory: Negative for cough and shortness of breath           Sleep apnea   Cardiovascular: Negative for chest pain, "palpitations and leg swelling  Endocrine: Negative for cold intolerance, heat intolerance, polydipsia and polyphagia  Dm2   Genitourinary: Negative for dysuria and flank pain  Musculoskeletal: Negative for arthralgias and back pain  Skin: Negative for rash  Neurological: Negative for dizziness and headaches  Psychiatric/Behavioral: Negative for self-injury, sleep disturbance and suicidal ideas  The patient is nervous/anxious  Objective:  Vitals:    06/19/23 1735   BP: 140/92   BP Location: Left arm   Patient Position: Sitting   Cuff Size: Large   Pulse: 82   Resp: 16   SpO2: 96%   Weight: 106 kg (232 lb 9 6 oz)   Height: 5' 3 75\" (1 619 m)     Body mass index is 40 24 kg/m²  Physical Exam  Vitals and nursing note reviewed  Constitutional:       General: She is not in acute distress  Appearance: Normal appearance  She is not ill-appearing, toxic-appearing or diaphoretic  HENT:      Head: Normocephalic and atraumatic  Nose: Nose normal  No congestion  Mouth/Throat:      Mouth: Mucous membranes are moist       Pharynx: No oropharyngeal exudate or posterior oropharyngeal erythema  Eyes:      Extraocular Movements: Extraocular movements intact  Conjunctiva/sclera: Conjunctivae normal       Pupils: Pupils are equal, round, and reactive to light  Neck:      Vascular: No carotid bruit  Cardiovascular:      Rate and Rhythm: Normal rate and regular rhythm  Pulses: Normal pulses  Heart sounds: Normal heart sounds  No murmur heard  No gallop  Pulmonary:      Effort: Pulmonary effort is normal       Breath sounds: Normal breath sounds  No wheezing, rhonchi or rales  Abdominal:      General: There is no distension  Palpations: Abdomen is soft  There is no mass  Tenderness: There is no abdominal tenderness  Hernia: No hernia is present  Musculoskeletal:      Cervical back: Normal range of motion and neck supple  No rigidity or tenderness   " Right lower leg: No edema  Left lower leg: No edema  Lymphadenopathy:      Cervical: No cervical adenopathy  Skin:     Findings: No erythema or rash  Neurological:      General: No focal deficit present  Mental Status: She is alert and oriented to person, place, and time  Psychiatric:         Mood and Affect: Mood normal          Behavior: Behavior normal          Thought Content:  Thought content normal

## 2023-06-22 LAB — HBA1C MFR BLD HPLC: 8.6 %

## 2023-06-22 PROCEDURE — 3052F HG A1C>EQUAL 8.0%<EQUAL 9.0%: CPT | Performed by: FAMILY MEDICINE

## 2023-07-14 ENCOUNTER — VBI (OUTPATIENT)
Dept: ADMINISTRATIVE | Facility: OTHER | Age: 58
End: 2023-07-14

## 2023-09-12 ENCOUNTER — RA CDI HCC (OUTPATIENT)
Dept: OTHER | Facility: HOSPITAL | Age: 58
End: 2023-09-12

## 2023-09-12 NOTE — PROGRESS NOTES
[address] coding opportunities          Chart Reviewed number of suggestions sent to Provider: 1     Patients Insurance     Commercial Insurance: Kit Calles       E66.01: Morbid (severe) obesity due to excess calories ([address]) [[id number]]    Per CMS/ICD 10 coding guidelines, to be used when BMI >=40

## 2023-09-18 ENCOUNTER — OFFICE VISIT (OUTPATIENT)
Dept: FAMILY MEDICINE CLINIC | Facility: CLINIC | Age: 58
End: 2023-09-18
Payer: COMMERCIAL

## 2023-09-18 VITALS
HEIGHT: 64 IN | BODY MASS INDEX: 39.4 KG/M2 | TEMPERATURE: 97.2 F | SYSTOLIC BLOOD PRESSURE: 144 MMHG | DIASTOLIC BLOOD PRESSURE: 88 MMHG | WEIGHT: 230.8 LBS | OXYGEN SATURATION: 97 % | HEART RATE: 67 BPM

## 2023-09-18 DIAGNOSIS — R39.81 FUNCTIONAL URINARY INCONTINENCE: ICD-10-CM

## 2023-09-18 DIAGNOSIS — R32 URINARY INCONTINENCE, UNSPECIFIED TYPE: ICD-10-CM

## 2023-09-18 DIAGNOSIS — F41.9 ANXIETY: ICD-10-CM

## 2023-09-18 DIAGNOSIS — R53.83 OTHER FATIGUE: ICD-10-CM

## 2023-09-18 DIAGNOSIS — J45.41 MODERATE PERSISTENT ASTHMA WITH ACUTE EXACERBATION: ICD-10-CM

## 2023-09-18 DIAGNOSIS — R74.8 ELEVATED LIVER ENZYMES: ICD-10-CM

## 2023-09-18 DIAGNOSIS — Z12.31 ENCOUNTER FOR SCREENING MAMMOGRAM FOR BREAST CANCER: ICD-10-CM

## 2023-09-18 DIAGNOSIS — E78.2 MIXED HYPERLIPIDEMIA: ICD-10-CM

## 2023-09-18 DIAGNOSIS — R21 RASH AND NONSPECIFIC SKIN ERUPTION: ICD-10-CM

## 2023-09-18 DIAGNOSIS — E11.9 TYPE 2 DIABETES MELLITUS WITHOUT COMPLICATION, WITHOUT LONG-TERM CURRENT USE OF INSULIN (HCC): Primary | ICD-10-CM

## 2023-09-18 PROCEDURE — 99214 OFFICE O/P EST MOD 30 MIN: CPT | Performed by: FAMILY MEDICINE

## 2023-09-18 RX ORDER — METFORMIN HYDROCHLORIDE 500 MG/1
1000 TABLET, EXTENDED RELEASE ORAL
Qty: 180 TABLET | Refills: 3 | Status: SHIPPED | OUTPATIENT
Start: 2023-09-18 | End: 2023-12-17

## 2023-09-18 RX ORDER — OXYBUTYNIN CHLORIDE 10 MG/1
10 TABLET, EXTENDED RELEASE ORAL
Qty: 90 TABLET | Refills: 3 | Status: CANCELLED | OUTPATIENT
Start: 2023-09-18 | End: 2023-12-17

## 2023-09-18 RX ORDER — ESCITALOPRAM OXALATE 10 MG/1
10 TABLET ORAL DAILY
Qty: 90 TABLET | Refills: 3 | Status: SHIPPED | OUTPATIENT
Start: 2023-09-18 | End: 2023-12-17

## 2023-09-18 RX ORDER — OXYBUTYNIN CHLORIDE 15 MG/1
15 TABLET, EXTENDED RELEASE ORAL
Qty: 90 TABLET | Refills: 0 | Status: SHIPPED | OUTPATIENT
Start: 2023-09-18 | End: 2023-12-17

## 2023-09-18 RX ORDER — CLOTRIMAZOLE AND BETAMETHASONE DIPROPIONATE 10; .64 MG/G; MG/G
CREAM TOPICAL 2 TIMES DAILY
Qty: 45 G | Refills: 1 | Status: SHIPPED | OUTPATIENT
Start: 2023-09-18 | End: 2023-09-29 | Stop reason: ALTCHOICE

## 2023-09-18 NOTE — PATIENT INSTRUCTIONS
Type 2 Diabetes Management for Adults   AMBULATORY CARE:   Type 2 diabetes  is a disease that affects how your body uses glucose (sugar). Either your body cannot make enough insulin, or it cannot use the insulin correctly. It is important to keep diabetes controlled to prevent damage to your heart, blood vessels, and other organs. Management will help you feel well and enjoy your daily activities. Your diabetes care team providers can help you make a plan to fit diabetes care into your schedule. Your plan can change over time to fit your needs and your family's needs. Have someone call your local emergency number (911 in the [address]) if:   • You cannot be woken. • You have signs of diabetic ketoacidosis:     ? confusion, fatigue    ? vomiting    ? rapid heartbeat    ? fruity smelling breath    ? extreme thirst    ? dry mouth and skin    • You have any of the following signs of a heart attack:      ? Squeezing, pressure, or pain in your chest    ? You may  also have any of the following:     - Discomfort or pain in your back, neck, jaw, stomach, or arm    - Shortness of breath    - Nausea or vomiting    - Lightheadedness or a sudden cold sweat    • You have any of the following signs of a stroke:      ? Numbness or drooping on one side of your face     ? Weakness in an arm or leg    ? Confusion or difficulty speaking    ? Dizziness, a severe headache, or vision loss    Call your doctor or diabetes care team provider if:   • You have a sore or wound that will not heal.    • You have a change in the amount you urinate. • Your blood sugar levels are higher than your target goals. • You often have lower blood sugar levels than your target goals. • Your skin is red, dry, warm, or swollen. • You have trouble coping with diabetes, or you feel anxious or depressed. • You have questions or concerns about your condition or care.     What you need to know about high blood sugar levels:  High blood sugar levels may not cause any symptoms. You may feel more thirsty or urinate more often than usual. Over time, high blood sugar levels can damage your nerves, blood vessels, tissues, and organs. The following can increase your blood sugar levels:  • Large meals or large amounts of carbohydrates at one time    • Less physical activity    • Stress    • Illness    • A lower dose of diabetes medicine or insulin, or a late dose    What you need to know about low blood sugar levels:  Symptoms include feeling shaky, dizzy, irritable, or confused. You can prevent symptoms by keeping your blood sugar levels from going too low. • Treat a low blood sugar level right away:      ? Drink 4 ounces of juice or have 1 tube of glucose gel. ? Check your blood sugar level again 10 to 15 minutes later. ? When the level goes back to normal, eat a meal or snack to prevent another decrease. • Keep glucose gel, raisins, or hard candy with you at all times to treat a low blood sugar level. • Your blood sugar level can get too low if you take diabetes medicine or insulin and do not eat enough food. • If you use insulin, check your blood sugar level before you exercise. ? If your blood sugar level is below 100 mg/dL, eat 4 crackers or 2 ounces of raisins, or drink 4 ounces of juice. ? Check your level every 30 minutes if you exercise longer than 1 hour. ? You may need a snack during or after exercise. What you can do to manage your blood sugar levels:   • Check your blood sugar levels as directed and as needed. Several items are available to use to check your levels. You may need to check by testing a drop of blood in a glucose monitor. You may instead be given a continuous glucose monitoring (CGM) device. The device is worn at all times. The CGM checks your blood sugar level every 5 minutes. It sends results to an electronic device such as a smart phone. A CGM can be used with or without an insulin pump.  You and your diabetes care team providers will decide on the best method for you. The goal for blood sugar levels before meals  is between 80 and 130 mg/dL and 2 hours after eating  is lower than 180 mg/dL. • Make healthy food choices. Work with a dietitian to develop a meal plan that works for you and your schedule. A dietitian can help you learn how to eat the right amount of carbohydrates during your meals and snacks. Carbohydrates can raise your blood sugar level if you eat too many at one time. Examples of foods that contain carbohydrates are breads, cereals, rice, pasta, and sweets. • Eat high-fiber foods as directed. Fiber helps improve blood sugar levels. Fiber also lowers your risk for heart disease and other problems diabetes can cause. Examples of high-fiber foods include vegetables, whole-grain bread, and beans such as roberts beans. Your dietitian can tell you how much fiber to have each day. • Get regular physical activity. Physical activity can help you get to your target blood sugar level goal and manage your weight. Get at least 150 minutes of moderate to vigorous aerobic physical activity each week. Do not miss more than 2 days in a row. Do not sit longer than 30 minutes at a time. Your healthcare provider can help you create an activity plan. The plan can include the best activities for you and can help you build your strength and endurance. • Maintain a healthy weight. Ask your team what a healthy weight is for you. A healthy weight can help you control diabetes and prevent heart disease. Ask your team to help you create a weight loss plan, if needed. Weight loss can help make a difference in managing diabetes. Your team will help you set a weight-loss goal, such as 10 to 15 pounds, or 5% of your extra weight. Together you and your team can set manageable weight loss goals. • Take your diabetes medicine or insulin as directed.   You may need diabetes medicine, insulin, or both to help control your blood sugar levels. Your healthcare provider will teach you how and when to take your diabetes medicine or insulin. You will also be taught about side effects oral diabetes medicine can cause. Insulin may be injected or given through a pump or pen. You and your providers will decide on the best method for you:    ? An insulin pump  is an implanted device that gives your insulin 24 hours a day. An insulin pump prevents the need for multiple insulin injections in a day. ? An insulin pen  is a device prefilled with the right amount of insulin. ? You and your family members will be taught how to draw up and give insulin  if this is the best method for you. Your providers will also teach you how to dispose of needles and syringes. ? You will learn how much insulin you need  and when to give it. You will be taught when not to give insulin. You will also be taught what to do if your blood sugar level drops too low. This may happen if you take insulin and do not eat the right amount of carbohydrates. More ways to manage type 2 diabetes:   • Wear medical alert identification. Wear medical alert jewelry or carry a card that says you have diabetes. Ask your provider where to get these items. • Do not smoke. Nicotine and other chemicals in cigarettes and cigars can cause lung and blood vessel damage. It also makes it more difficult to manage your diabetes. Ask your provider for information if you currently smoke and need help to quit. Do not use e-cigarettes or smokeless tobacco in place of cigarettes or to help you quit. They still contain nicotine. • Check your feet each day for cuts, scratches, calluses, or other wounds. Look for redness and swelling, and feel for warmth. Wear shoes that fit well. Check your shoes for rocks or other objects that can hurt your feet. Do not walk barefoot or wear shoes without socks.  Wear cotton socks to help keep your feet dry. • Ask about vaccines you may need. You have a higher risk for serious illness if you get the flu, pneumonia, COVID-19, or hepatitis. Ask your provider if you should get vaccines to prevent these or other diseases, and when to get the vaccines. • Talk to your provider if you become stressed about diabetes care. Sometimes being able to fit diabetes care into your life can cause increased stress. The stress can cause you not to take care of yourself properly. Your care team providers can help by offering tips about self-care. Your providers may suggest you talk to a mental health provider who can listen and offer help with self-care issues. • Have your A1c checked as directed. Your provider may check your A1c every 3 months, or 2 times each year if your diabetes is controlled. An A1c test shows the average amount of sugar in your blood over the past 2 to 3 months. Your provider will tell you what your A1c level should be. • Have screening tests as directed. Your provider may recommend screening for complications of diabetes and other conditions that may develop. Some screenings may begin right away and some may happen within the first 5 years of diagnosis:    ? Examples of diabetes complications  include kidney problems, high cholesterol, high blood pressure, blood vessel problems, eye problems, and sleep apnea. ? You may be screened for a low vitamin B level  if you take oral diabetes medicine for a long time. ? Women of childbearing years may be screened  for polycystic ovarian syndrome (PCOS). Follow up with your doctor or diabetes care team providers as directed: You may need to have blood tests done before your follow-up visit. The test results will show if changes need to be made in your treatment or self-care. Talk to your provider if you cannot afford your medicine. Write down your questions so you remember to ask them during your visits.   © Copyright [employer] 2022 Information is for End User's use only and may not be sold, redistributed or otherwise used for commercial purposes. The above information is an  only. It is not intended as medical advice for individual conditions or treatments. Talk to your doctor, nurse or pharmacist before following any medical regimen to see if it is safe and effective for you. Foot Care for People with Diabetes   AMBULATORY CARE:   What you need to know about foot care:  Long-term high blood sugar levels can damage the blood vessels and nerves in your legs and feet. This damage makes it hard to feel pressure, pain, temperature, and touch. You may not be able to feel a cut or sore, or shoes that are too tight. Foot care is needed to prevent serious problems, such as an infection or amputation. Diabetes may cause your toes to become crooked or curved under. These changes may affect the way you walk and can lead to increased pressure on your foot. The pressure can decrease blood flow to your feet. Lack of blood flow increases your risk for a foot ulcer. Call your care team provider if:   • Your feet become numb, weak, or hard to move. • You have pus draining from a sore on your foot. • You have a wound on your foot that gets bigger, deeper, or does not heal.    • You see blisters, cuts, scratches, calluses, or sores on your foot. • You have a fever, and your feet become red, warm, and swollen. • Your toenails become thick, curled, or yellow. • You find it hard to check your feet because your vision is poor. • You have questions or concerns about your condition or care. How to care for your feet:   • Check your feet each day. Look at your whole foot, including the bottom, and between and under your toes. Check for wounds, corns, and calluses. Use a mirror to see the bottom of your feet. The skin on your feet may be shiny, tight, or darker than normal. Your feet may also be cold and pale.  Feel your feet by running your hands along the tops, bottoms, sides, and between your toes. Redness, swelling, and warmth are signs of blood flow problems that can lead to a foot ulcer. Do not try to remove corns or calluses yourself. Do not ignore small problems, such as dry skin or small wounds. These can become life-threatening over time without proper care. • Wash your feet each day with soap and warm water. Do not use hot water, because this can injure your foot. Dry your feet gently with a towel after you wash them. Dry between and under your toes. • Apply lotion or a moisturizer on your dry feet. Ask your care team provider what lotions are best to use. Do not put lotion or moisturizer between your toes. Moisture between your toes could lead to skin breakdown. • Cut your toenails correctly. File or cut your toenails straight across. Use a soft brush to clean around your toenails. If your toenails are very thick, you may need to have a care team provider or specialist cut them. • Protect your feet. Do not walk barefoot or wear your shoes without socks. Check your shoes for rocks or other objects that can hurt your feet. Wear cotton socks to help keep your feet dry. Wear socks without toe seams, or wear them with the seams inside out. Change your socks each day. Do not wear socks that are dirty or damp. • Wear shoes that fit well. Wear shoes that do not rub against any area of your feet. Your shoes should be ½ to ¾ inch (1 to 2 centimeters) longer than your feet. Your shoes should also have extra space around the widest part of your feet. Walking or athletic shoes with laces or straps that adjust are best. Ask your care team provider for help to choose shoes that fit you best. Ask your provider if you need to wear an insert, orthotic, or bandage on your feet. • Go to your follow-up visits. Your care team provider will do a foot exam at least 1 time each year.  You may need a foot exam more often if you have nerve damage, foot deformities, or ulcers. Your provider will check for nerve damage and how well you can feel your feet. Your provider will check your shoes to see if they fit well. • Do not smoke. Smoking can damage your blood vessels and put you at increased risk for foot ulcers. Ask your care team provider for information if you currently smoke and need help to quit. E-cigarettes or smokeless tobacco still contain nicotine. Talk to your care team provider before you use these products. Follow up with your diabetes care team provider or foot specialist as directed: You will need to have your feet checked at least 1 time each year. You may need a foot exam more often if you have nerve damage, foot deformities, or ulcers. Write down your questions so you remember to ask them during your visits. © Copyright [employer] 2022 Information is for End User's use only and may not be sold, redistributed or otherwise used for commercial purposes. The above information is an  only. It is not intended as medical advice for individual conditions or treatments. Talk to your doctor, nurse or pharmacist before following any medical regimen to see if it is safe and effective for you. What to Do if Your Blood Sugar is Low   AMBULATORY CARE:   Low blood sugar levels  (hypoglycemia) can happen with Type 1 and Type 2 diabetes. Low levels are more likely to happen if you use insulin. Hypoglycemia can cause you to have falls, accidents, and injuries. A blood sugar level that gets too low can lead to seizures, coma, and death. Learn to recognize the symptoms early so you can get treatment quickly. When your blood sugar is low you may feel:  • Sweaty    • Nervous or shaky    • Anxious or irritable    • Confused    • A fast, pounding heartbeat    • Extremely hungry    Have someone call your local emergency number (911 in the [address]) if:   • You cannot be woken. • You have a seizure.     Call your doctor if:   • You have symptoms of a low blood sugar level, such as trouble thinking, sweating, or a pounding heartbeat. • Your blood sugar level is lower than normal and it does not improve with treatment. • You often have lower blood sugar levels than your target goals. • You have trouble coping with your illness, or you feel anxious or depressed. • You have questions or concerns about your condition or care. What to do if you have symptoms of low blood sugar:   • Check your blood sugar level, if possible. Your blood sugar level is too low if it is at or below 70 mg/dL. • Eat or drink 15 grams of fast-acting carbohydrate. Fast-acting carbohydrates will raise your blood sugar level quickly. Examples of 15 grams of fast-acting carbohydrates:     ? 4 ounces (½ cup) of fruit juice     ? 4 ounces of regular soda    ? 2 tablespoons of raisins     ? 1 tube of glucose gel or 3 to 4 glucose tablets       • Check your blood sugar level 15 minutes later. If the level is still low (less than 100 mg/dL), eat another 15 grams of carbohydrate. When the level returns to 100 mg/dL, eat a snack or meal that contains carbohydrates. This will help prevent another drop in blood sugar. • Teach people close to you how to use your glucagon kit. Your blood sugar may be too low for you to be awake. People need to know when and how to use your kit. Prevent low blood sugar levels:  Prevent low blood sugar by knowing what increases your risk. Ask your healthcare provider for ways to prevent low blood sugar levels. Any of the following can increase your risk of low blood sugar:  • Fasting for tests or procedures    • During or after intense exercise    • Late or postponed meals    • Sleeping (you may need a bedtime snack)     • Drinking alcohol if you use insulin or insulin releasing pills    Follow up with your doctor as directed:  Write down your questions so you remember to ask them during your visits.   © Copyright [employer] 2022 Information is for End User's use only and may not be sold, redistributed or otherwise used for commercial purposes. The above information is an  only. It is not intended as medical advice for individual conditions or treatments. Talk to your doctor, nurse or pharmacist before following any medical regimen to see if it is safe and effective for you.

## 2023-09-18 NOTE — ASSESSMENT & PLAN NOTE
Her  Labs  Reviewed  Her  Cholesterol and triglycerides  Are high  Discussed  Diet  exercise  And  Started on  lipitor 10 mg

## 2023-09-18 NOTE — PROGRESS NOTES
Diabetic Foot Exam    Patient's shoes and socks removed. Right Foot/Ankle   Right Foot Inspection  Skin Exam: skin normal and skin intact. No dry skin, no warmth, no callus, no erythema, no maceration, no abnormal color, no pre-ulcer, no ulcer and no callus. Toe Exam: ROM and strength within normal limits. No swelling, no tenderness, erythema and  no right toe deformity    Sensory   Vibration: intact  Proprioception: intact  Monofilament testing: intact    Vascular  Capillary refills: < 3 seconds  The right DP pulse is 2+. The right PT pulse is 2+. Left Foot/Ankle  Left Foot Inspection  Skin Exam: skin normal and skin intact. No dry skin, no warmth, no erythema, no maceration, normal color, no pre-ulcer, no ulcer and no callus. Toe Exam: ROM and strength within normal limits. No swelling, no tenderness, no erythema and no left toe deformity. Sensory   Vibration: intact  Proprioception: intact  Monofilament testing: intact    Vascular  Capillary refills: < 3 seconds  The left DP pulse is 2+. The left PT pulse is 2+.      Assign Risk Category  No deformity present  No loss of protective sensation  No weak pulses  Risk: 0

## 2023-09-18 NOTE — PROGRESS NOTES
Assessment/Plan:as  below         Problem List Items Addressed This Visit        Endocrine    Type 2 diabetes mellitus without complication, without long-term current use of insulin ([address]) - Primary       Lab Results   Component Value Date    HGBA1C 8.6 06/22/2023   came in  For follow up on her multiple health  Conditions ,her labs reviewed  Her  a1c  Is 8.6 done in June  She is up to date with foot and  Eye  Exam will repeat her labs next  Month added januvia 50 mg   Advised her to go  For  [facility] today normal         Relevant Medications    metFORMIN (GLUCOPHAGE-XR) 500 mg 24 hr tablet    sitaGLIPtin (JANUVIA) 25 mg tablet       Respiratory    Moderate persistent asthma with acute exacerbation     Stable             Other    Functional urinary incontinence     She  Declined to  See the urologist   Increased  The  Dose of oxybutanin to 15 mg ex  Will  f/u         Anxiety     Much  Stable  Now no  negetive thoughts         Relevant Medications    escitalopram (LEXAPRO) 10 mg tablet   Other Visit Diagnoses     Encounter for screening mammogram for breast cancer        Relevant Orders    Mammo screening bilateral w 3d & cad    Urinary incontinence, unspecified type        Relevant Medications    oxybutynin (DITROPAN XL) 15 MG 24 hr tablet    Elevated liver enzymes        Relevant Orders    Ambulatory Referral to Gastroenterology            Subjective:      Patient ID: Vashti Amezcua is a 62 y.o. female.     HPI came in to follow up on her  dm2  Hyperlipidemia and urinary incontenence her  Anxiety is  stabe     The following portions of the patient's history were reviewed and updated as appropriate:   Past Medical History:  She has a past medical history of Anxiety (09/16/2020), Arthritis, Asthma, Class 2 obesity due to excess calories in adult (02/24/2021), Contusion of ribs, right, initial encounter (09/22/2021), CPAP (continuous positive airway pressure) dependence, Diabetes mellitus ([address]), Functional urinary incontinence (09/14/2020), Hypertension, Kidney stone, Moderate persistent asthma with acute exacerbation (09/16/2020), Obesity, Sleep apnea, Type 2 diabetes mellitus without complication, without long-term current use of insulin ([address]) (09/16/2020), and Vitamin D deficiency. ,  _______________________________________________________________________  Medical Problems:  does not have any pertinent problems on file.,  _______________________________________________________________________  Past Surgical History:   has a past surgical history that includes Tonsillectomy; Colonoscopy (01/01/2015); Hysteroscopy (01/01/2008); Other surgical history (1998); Cyst Removal; Mammo (historical) (01/04/2016); and pr tendon sheath incision (Right, 2/6/2023). ,  _______________________________________________________________________  Family History:  family history includes ADD / ADHD in her father; Anxiety disorder in her father; Arthritis in her brother, father, and mother; Bipolar disorder in her brother; Breast cancer (age of onset: 27) in her maternal aunt and maternal aunt; Breast cancer (age of onset: 72) in her maternal aunt; Cancer (age of onset: 52) in her brother; Depression in her brother, father, and mother; Diabetes in her mother; Drug abuse in her brother; Heart attack in her mother; Hyperlipidemia in her mother; Hypertension in her father and mother; Rheum arthritis in her father and mother; Substance Abuse in her brother; Suicide Attempts in her brother.,  _______________________________________________________________________  Social History:   reports that she has never smoked. She has never used smokeless tobacco. She reports current alcohol use. She reports that she does not use drugs. ,  _______________________________________________________________________  Allergies:  is allergic to bee venom, pneumococcal vaccines, and other..  _______________________________________________________________________  Current Outpatient Medications   Medication Sig Dispense Refill   • acetaminophen (TYLENOL) 325 mg tablet Take 650 mg by mouth every 6 (six) hours as needed for mild pain     • Apple Cider Vinegar 600 MG CAPS Take 1 Capful by mouth in the morning     • b complex vitamins tablet Take 1 tablet by mouth daily     • BORON PO Take 1 Capful by mouth in the morning     • cholecalciferol (VITAMIN D3) 1,000 units tablet Take 10,000 Units by mouth daily     • Chromium Picolinate 1000 MCG TABS Take by mouth Once daily     • COLLAGEN PO Take 20 mg by mouth Daily     • EPINEPHrine (EPIPEN JR) 0.15 mg/0.3 mL SOAJ Inject 0.3 mg into a muscle once     • escitalopram (LEXAPRO) 10 mg tablet Take 1 tablet (10 mg total) by mouth daily 90 tablet 3   • fluticasone (FLONASE) 50 mcg/act nasal spray 2 sprays into each nostril daily Per nare daily     • levalbuterol (XOPENEX) 0.31 mg/3 mL nebulizer solution Take 1 ampule by nebulization every 4 (four) hours as needed for wheezing     • Loratadine 10 MG CAPS Take by mouth daily after breakfast     • Magnesium 500 MG TABS Take by mouth daily after breakfast     • metFORMIN (GLUCOPHAGE-XR) 500 mg 24 hr tablet Take 2 tablets (1,000 mg total) by mouth daily with breakfast 180 tablet 3   • Omega-3 Fatty Acids (Fish Oil) 1200 MG CPDR Take 2,400 mg by mouth daily after breakfast 2 daily     • oxybutynin (DITROPAN XL) 15 MG 24 hr tablet Take 1 tablet (15 mg total) by mouth daily at bedtime 90 tablet 0   • oxybutynin (DITROPAN-XL) 10 MG 24 hr tablet Take 1 tablet (10 mg total) by mouth daily at bedtime 90 tablet 0   • sitaGLIPtin (JANUVIA) 25 mg tablet Take 1 tablet (25 mg total) by mouth daily 30 tablet 5     No current facility-administered medications for this visit.     _______________________________________________________________________  Review of Systems   Constitutional: Negative for fatigue and fever.    HENT: Negative for congestion, postnasal drip and sinus pain. Eyes: Negative for pain, discharge, redness and itching. Respiratory: Negative for cough and shortness of breath. Cardiovascular: Negative for chest pain, palpitations and leg swelling. Hyperlipidemia   Gastrointestinal: Negative for abdominal distention and abdominal pain. Endocrine: Negative for cold intolerance, heat intolerance, polydipsia and polyphagia. Dm2   Genitourinary: Negative for flank pain and frequency. Musculoskeletal: Negative for back pain. Skin: Negative for rash. Neurological: Negative for dizziness and headaches. Psychiatric/Behavioral: Negative for self-injury, sleep disturbance and suicidal ideas. The patient is nervous/anxious. Objective:  Vitals:    09/18/23 1756   BP: 144/88   BP Location: Left arm   Patient Position: Sitting   Cuff Size: Standard   Pulse: 67   Temp: (!) 97.2 °F (36.2 °C)   TempSrc: Temporal   SpO2: 97%   Weight: 105 kg (230 lb 12.8 oz)   Height: 5' 3.75" (1.619 m)     Body mass index is 39.93 kg/m². Physical Exam  Vitals and nursing note reviewed. Constitutional:       General: She is not in acute distress. Appearance: Normal appearance. She is not toxic-appearing. HENT:      Head: Normocephalic and atraumatic. Nose: Nose normal.      Mouth/Throat:      Mouth: Mucous membranes are moist.      Pharynx: Oropharynx is clear. Eyes:      Extraocular Movements: Extraocular movements intact. Conjunctiva/sclera: Conjunctivae normal.      Pupils: Pupils are equal, round, and reactive to light. Cardiovascular:      Rate and Rhythm: Normal rate and regular rhythm. Pulses: Normal pulses. Heart sounds: Normal heart sounds. No murmur heard. No gallop. Pulmonary:      Effort: Pulmonary effort is normal.      Breath sounds: Normal breath sounds. No wheezing or rhonchi. Abdominal:      General: There is no distension. Palpations:  There is no mass.      Tenderness: There is no guarding. Musculoskeletal:      Cervical back: Normal range of motion and neck supple. Right lower leg: No edema. Left lower leg: No edema. Lymphadenopathy:      Cervical: No cervical adenopathy. Skin:     Findings: No erythema or rash. Neurological:      General: No focal deficit present. Mental Status: She is alert and oriented to person, place, and time.    Psychiatric:         Mood and Affect: Mood normal.         Behavior: Behavior normal.

## 2023-09-18 NOTE — ASSESSMENT & PLAN NOTE
Lab Results   Component Value Date    HGBA1C 8.6 06/22/2023   came in  For follow up on her multiple health  Conditions ,her labs reviewed  Her  a1c  Is 8.6 done in June  She is up to date with foot and  Eye  Exam will repeat her labs next  Month added januvia 50 mg   Advised her to go  For  [facility] today normal

## 2023-09-19 ENCOUNTER — TELEPHONE (OUTPATIENT)
Age: 58
End: 2023-09-19

## 2023-09-19 DIAGNOSIS — E78.2 MIXED HYPERLIPIDEMIA: Primary | ICD-10-CM

## 2023-09-19 RX ORDER — ATORVASTATIN CALCIUM 10 MG/1
10 TABLET, FILM COATED ORAL DAILY
Qty: 90 TABLET | Refills: 0 | Status: SHIPPED | OUTPATIENT
Start: 2023-09-19 | End: 2023-12-18

## 2023-09-19 NOTE — TELEPHONE ENCOUNTER
Patient called was seen in the office yesterday. Patient stated Jeana Fees mentioned Lipitor but no medication was called into the pharmacy. Please advise.

## 2023-09-20 ENCOUNTER — TELEPHONE (OUTPATIENT)
Dept: ADMINISTRATIVE | Facility: OTHER | Age: 58
End: 2023-09-20

## 2023-09-20 NOTE — TELEPHONE ENCOUNTER
----- Message from Mayela Rojas sent at 9/18/2023  5:51 PM EDT -----  Regarding: care gap request  09/18/23 5:51 PM    Hello, our patient attached above has had Diabetic Eye Exam completed/performed. Please assist in updating the patient chart by making an External outreach to Dr. Mary Cannon facility ([address]) located in [facility] on [address] [place] [address]. The date of service is October 5 2022.     Thank you,  Mayela Rojas   PRIMARY CARE [place]

## 2023-09-20 NOTE — TELEPHONE ENCOUNTER
Upon review of the In Basket request and the patient's chart, initial outreach has been made via fax to facility. Please see Contacts section for details.      Thank you  Nory Leary MA

## 2023-09-20 NOTE — LETTER
Diabetic Eye Exam Form    Date Requested: 23  Patient: Jules Brown  Patient : 1965   Referring Provider: Sara Whitten MD      DIABETIC Eye Exam Date _______________________________      Type of Exam MUST be documented for Diabetic Eye Exams. Please CHECK ONE. Retinal Exam       Dilated Retinal Exam       OCT       Optomap-Iris Exam      Fundus Photography       Left Eye - Please check Retinopathy or No Retinopathy        Exam did show retinopathy    Exam did not show retinopathy       Right Eye - Please check Retinopathy or No Retinopathy       Exam did show retinopathy    Exam did not show retinopathy       Comments __________________________________________________________    Practice Providing Exam ______________________________________________    Exam Performed By (print name) _______________________________________      Provider Signature ___________________________________________________      These reports are needed for  compliance. Please fax this completed form and a copy of the Diabetic Eye Exam report to our office located at [address] [place] [address] as soon as possible via Fax [phone] attention Virgie: Phone [phone]  We thank you for your assistance in treating our mutual patient.

## 2023-09-21 NOTE — TELEPHONE ENCOUNTER
Upon review of the In Basket request we were able to locate, review, and update the patient chart as requested for Diabetic Eye Exam.    Any additional questions or concerns should be emailed to the Practice Liaisons via the appropriate education email address, please do not reply via In Basket.     Thank you  Anton Vu MA

## 2023-09-26 ENCOUNTER — HOSPITAL ENCOUNTER (OUTPATIENT)
Dept: RADIOLOGY | Facility: IMAGING CENTER | Age: 58
Discharge: HOME | End: 2023-09-26
Payer: COMMERCIAL

## 2023-09-26 VITALS — BODY MASS INDEX: 39.14 KG/M2 | HEIGHT: 64 IN | WEIGHT: 229.28 LBS

## 2023-09-26 DIAGNOSIS — Z12.31 ENCOUNTER FOR SCREENING MAMMOGRAM FOR BREAST CANCER: ICD-10-CM

## 2023-09-26 PROCEDURE — 77067 SCR MAMMO BI INCL CAD: CPT

## 2023-09-26 PROCEDURE — 77063 BREAST TOMOSYNTHESIS BI: CPT

## 2023-09-29 ENCOUNTER — TELEPHONE (OUTPATIENT)
Dept: FAMILY MEDICINE CLINIC | Facility: CLINIC | Age: 58
End: 2023-09-29

## 2023-09-29 ENCOUNTER — AMB VIDEO VISIT (OUTPATIENT)
Dept: OTHER | Facility: HOSPITAL | Age: 58
End: 2023-09-29

## 2023-09-29 DIAGNOSIS — J20.9 ACUTE BRONCHITIS, UNSPECIFIED ORGANISM: Primary | ICD-10-CM

## 2023-09-29 PROBLEM — M65.341 TRIGGER RING FINGER OF RIGHT HAND: Status: RESOLVED | Noted: 2022-12-15 | Resolved: 2023-09-29

## 2023-09-29 PROCEDURE — ECARE PR SL URGENT CARE VIRTUAL VISIT: Performed by: PHYSICIAN ASSISTANT

## 2023-09-29 RX ORDER — BENZONATATE 200 MG/1
200 CAPSULE ORAL 3 TIMES DAILY PRN
Qty: 20 CAPSULE | Refills: 0 | Status: SHIPPED | OUTPATIENT
Start: 2023-09-29

## 2023-09-29 RX ORDER — LEVALBUTEROL INHALATION SOLUTION 0.31 MG/3ML
1 SOLUTION RESPIRATORY (INHALATION) EVERY 4 HOURS PRN
Qty: 90 ML | Refills: 0 | Status: SHIPPED | OUTPATIENT
Start: 2023-09-29 | End: 2023-09-29 | Stop reason: CLARIF

## 2023-09-29 RX ORDER — LEVALBUTEROL INHALATION SOLUTION 1.25 MG/3ML
1.25 SOLUTION RESPIRATORY (INHALATION) 3 TIMES DAILY
Qty: 90 ML | Refills: 0 | Status: SHIPPED | OUTPATIENT
Start: 2023-09-29 | End: 2023-10-03 | Stop reason: SDUPTHER

## 2023-09-29 RX ORDER — METHYLPREDNISOLONE 4 MG/1
TABLET ORAL
Qty: 21 TABLET | Refills: 0 | Status: SHIPPED | OUTPATIENT
Start: 2023-09-29

## 2023-09-29 NOTE — PROGRESS NOTES
Video Visit - Ananth Alegria 62 y.o. female MRN: [id number]    REQUIRED DOCUMENTATION:         1. This service was provided via [employer]. 2. Provider located at [address] [place] [id number] [phone].  3. [facility] provider: Laura Hunt PA-C.  4. Identify all parties in room with patient during [facility] visit:  No one else  5. After connecting through televideo, patient was identified by name and date of birth. Patient was then informed that this was a Telemedicine visit and that the exam was being conducted confidentially over secure lines. My office door was closed. No one else was in the room. Patient acknowledged consent and understanding of privacy and security of the Telemedicine visit. I informed the patient that I have reviewed their record in Epic and presented the opportunity for them to ask any questions regarding the visit today. The patient agreed to participate. VITALS: Heart Rate: 74 BPM, Respiratory Rate: 14 RPM, Temperature Unavailable° F, Blood Pressure Unavailable mmHg, Pulse Ox Unavailable % on RA    HPI  Patient with hx chronic bronchitis, asthma, NICKY reports 4 days ago started with mild sore throat, the next day started coughing, following day a lot of coughing with rhinorrhea and PND. Yesterday started using neti pot, proventil inhaler without relief. COVID test negative x 2. No fevers/chills, SOB. Patient is a respiratory therapist  Physical Exam  Constitutional:       General: She is not in acute distress. Appearance: Normal appearance. She is not toxic-appearing. HENT:      Head: Normocephalic and atraumatic. Nose: No rhinorrhea. Mouth/Throat:      Mouth: Mucous membranes are moist.   Eyes:      Conjunctiva/sclera: Conjunctivae normal.   Cardiovascular:      Rate and Rhythm: Normal rate. Pulmonary:      Effort: Pulmonary effort is normal. No respiratory distress. Breath sounds:  No wheezing (no gross audible wheeze through computer). Comments: Wet hacking cough  Musculoskeletal:      Cervical back: Normal range of motion. Skin:     Findings: No rash (on face or neck). Neurological:      Mental Status: She is alert. Cranial Nerves: No dysarthria or facial asymmetry. Psychiatric:         Mood and Affect: Mood normal.         Behavior: Behavior normal.         Diagnoses and all orders for this visit:    Acute bronchitis, unspecified organism  -     methylPREDNISolone 4 MG tablet therapy pack; Use as directed on package  -     benzonatate (TESSALON) 200 MG capsule; Take 1 capsule (200 mg total) by mouth 3 (three) times a day as needed for cough  -     Discontinue: levalbuterol (XOPENEX) 0.31 mg/3 mL nebulizer solution; Take 3 mL (0.31 mg total) by nebulization every 4 (four) hours as needed for wheezing  -     levalbuterol (Xopenex) 1.25 mg/3 mL nebulizer solution; Take 3 mL (1.25 mg total) by nebulization 3 (three) times a day  -     Pulse Oximeter      Patient Instructions     Schedule a follow-up appointment with your primary care physician for recheck in 2-3 days. If you cannot see your PCP, you can schedule a follow up appointment at a [facility]. Go to the emergency department if you develop any new or worsening symptoms including shortness of breath, chest pain, or anything else that is concerning. Excuses can be found in "Letters" section of [employer]. Can print if opened from a [address] phone number is [phone] if you need assistance or have further questions    1 21 [phone]) [facility] ([phone])  Schedule or Reschedule Outpatient Testing - Option 2  Billing - Option 3  General Info - Option 4  [employer] Help - Option 5  Comprehensive Spine Program - Option 6   COVID - Option 7    Acute Bronchitis   WHAT YOU NEED TO KNOW:   Acute bronchitis is swelling and irritation in your lungs. It is usually caused by a virus and most often happens in the winter.  Bronchitis may also be caused by bacteria or by a chemical irritant, such as smoke. DISCHARGE INSTRUCTIONS:   Return to the emergency department if:   • You cough up blood. • Your lips or fingernails turn blue. • You feel like you are not getting enough air when you breathe. Call your doctor if:   • Your symptoms do not go away or get worse, even after treatment. • Your cough does not get better within 4 weeks. • You have questions or concerns about your condition or care. Medicines: You may need any of the following:  • Cough suppressants  decrease your urge to cough. • Decongestants  help loosen mucus in your lungs and make it easier to cough up. This can help you breathe easier. • Inhalers  may be given. Your healthcare provider may give you one or more inhalers to help you breathe easier and cough less. An inhaler gives you medicine to open your airways. Ask your healthcare provider to show you how to use your inhaler correctly. • Antiviral medicine  treats infections caused by a virus. • Antibiotics  may be given if your bronchitis is caused by bacteria or if you have lung condition. • Acetaminophen  decreases pain and fever. It is available without a doctor's order. Ask how much to take and how often to take it. Follow directions. Read the labels of all other medicines you are using to see if they also contain acetaminophen, or ask your doctor or pharmacist. Acetaminophen can cause liver damage if not taken correctly. • NSAIDs  help decrease swelling and pain or fever. This medicine is available with or without a doctor's order. NSAIDs can cause stomach bleeding or kidney problems in certain people. If you take blood thinner medicine, always ask your healthcare provider if NSAIDs are safe for you. Always read the medicine label and follow directions. Self-care:   • Drink liquids as directed. You may need to drink more liquids than usual to stay hydrated.  Ask how much liquid to drink each day and which liquids are best for you. • Use a cool mist humidifier. This increases air moisture in your home. This may make it easier for you to breathe and help decrease your cough. • Get more rest.  Rest helps your body to heal. Slowly start to do more each day. Rest when you feel it is needed. Prevent acute bronchitis:       • Ask about vaccines you may need. Get a flu vaccine each year as soon as recommended, usually in September or October. Ask your healthcare provider if you should also get a pneumonia or COVID-19 vaccine. Your healthcare provider can tell you if you should also get other vaccines, and when to get them. • Prevent the spread of germs. You can decrease your risk for acute bronchitis and other illnesses by doing the following:     ? Wash your hands often with soap and water. Carry germ-killing hand lotion or gel with you. You can use the lotion or gel to clean your hands when soap and water are not available. ? Do not touch your eyes, nose, or mouth unless you have washed your hands first.    ? Always cover your mouth when you cough to prevent the spread of germs. It is best to cough into a tissue or your shirt sleeve instead of into your hand. Ask those around you to cover their mouths when they cough. ? Try to avoid people who have a cold or the flu. If you are sick, stay away from others as much as possible. • Avoid irritants in the air. Avoid chemicals, fumes, and dust. Wear a face mask if you must work around dust or fumes. Stay inside on days when air pollution levels are high. If you have allergies, stay inside when pollen counts are high. Do not use aerosol products, such as spray-on deodorant, bug spray, and hair spray. • Do not smoke or be around others who are smoking. Nicotine and other chemicals in cigarettes and cigars can cause lung damage. Ask your healthcare provider for information if you currently smoke and need help to quit. E-cigarettes or smokeless tobacco still contain nicotine. Talk to your healthcare provider before you use these products. Follow up with your doctor as directed:  Write down questions you have so you will remember to ask them during your follow-up visits. © Copyright [employer] 2023 Information is for End User's use only and may not be sold, redistributed or otherwise used for commercial purposes. The above information is an  only. It is not intended as medical advice for individual conditions or treatments. Talk to your doctor, nurse or pharmacist before following any medical regimen to see if it is safe and effective for you.

## 2023-09-29 NOTE — TELEPHONE ENCOUNTER
----- Message from Rayna Devries MD sent at 9/28/2023  7:37 PM EDT -----  Normal mammogram  ----- Message -----  From: Shelby Morgan MD  Sent: 9/27/2023   3:06 PM EDT  To: Rayna Devries MD

## 2023-09-29 NOTE — CARE ANYWHERE EVISITS
Visit Summary for Kandice Borges - Gender: Female - Date of Birth: [id number]  Date: [id number] - Duration: 15 minutes  Patient: Kandice Borges  Provider: Igor Shelley PA-C    Patient Contact Information  Address  [address] [place] [address]; [address]  [phone]    Visit Topics  Flu-Like Symptoms [Added By: Self - 2023-09-29]  Cold [Added By: Self - 2023-09-29]    Triage Questions   What is your current physical address in the event of a medical emergency? Answer []  Are you allergic to any medications? Answer []  Are you now or could you be pregnant? Answer []  Do you have any immune system compromise or chronic lung   disease? Answer []  Do you have any vulnerable family members in the home (infant, pregnant, cancer, elderly)? Answer []     Conversation Transcripts  [0A][0A] [Notification] You are connected with Igor Shelley PA-C, Urgent Care Specialist.[0A][Notification] Autumn Calhoun is located in [place]. [0A][Notification] Autumn Calhoun has shared health history. Tracee Laurent .[0A]    Diagnosis  Acute bronchitis    Procedures  Value: [id number] Code: CPT-4 UNLISTED E&M SERVICE    Medications Prescribed    No prescriptions ordered    Electronically signed by: Elsi Wallace(NPI [id number])

## 2023-09-29 NOTE — PATIENT INSTRUCTIONS
Schedule a follow-up appointment with your primary care physician for recheck in 2-3 days. If you cannot see your PCP, you can schedule a follow up appointment at a [facility]. Go to the emergency department if you develop any new or worsening symptoms including shortness of breath, chest pain, or anything else that is concerning. Excuses can be found in "Letters" section of [email or web address] siri. Can print if opened from a [address] phone number is [phone] if you need assistance or have further questions    1 [phone]) GABRIELA ([phone])  Schedule or Reschedule Outpatient Testing - Option 2  Billing - Option 3  General Info - Option 4  [employer] Help - Option 5  Comprehensive Spine Program - Option 6   COVID - Option 7    Acute Bronchitis   WHAT YOU NEED TO KNOW:   Acute bronchitis is swelling and irritation in your lungs. It is usually caused by a virus and most often happens in the winter. Bronchitis may also be caused by bacteria or by a chemical irritant, such as smoke. DISCHARGE INSTRUCTIONS:   Return to the emergency department if:   You cough up blood. Your lips or fingernails turn blue. You feel like you are not getting enough air when you breathe. Call your doctor if:   Your symptoms do not go away or get worse, even after treatment. Your cough does not get better within 4 weeks. You have questions or concerns about your condition or care. Medicines: You may need any of the following:  Cough suppressants  decrease your urge to cough. Decongestants  help loosen mucus in your lungs and make it easier to cough up. This can help you breathe easier. Inhalers  may be given. Your healthcare provider may give you one or more inhalers to help you breathe easier and cough less. An inhaler gives you medicine to open your airways. Ask your healthcare provider to show you how to use your inhaler correctly.          Antiviral medicine  treats infections caused by a virus.    Antibiotics  may be given if your bronchitis is caused by bacteria or if you have lung condition. Acetaminophen  decreases pain and fever. It is available without a doctor's order. Ask how much to take and how often to take it. Follow directions. Read the labels of all other medicines you are using to see if they also contain acetaminophen, or ask your doctor or pharmacist. Acetaminophen can cause liver damage if not taken correctly. NSAIDs  help decrease swelling and pain or fever. This medicine is available with or without a doctor's order. NSAIDs can cause stomach bleeding or kidney problems in certain people. If you take blood thinner medicine, always ask your healthcare provider if NSAIDs are safe for you. Always read the medicine label and follow directions. Self-care:   Drink liquids as directed. You may need to drink more liquids than usual to stay hydrated. Ask how much liquid to drink each day and which liquids are best for you. Use a cool mist humidifier. This increases air moisture in your home. This may make it easier for you to breathe and help decrease your cough. Get more rest.  Rest helps your body to heal. Slowly start to do more each day. Rest when you feel it is needed. Prevent acute bronchitis:       Ask about vaccines you may need. Get a flu vaccine each year as soon as recommended, usually in September or October. Ask your healthcare provider if you should also get a pneumonia or COVID-19 vaccine. Your healthcare provider can tell you if you should also get other vaccines, and when to get them. Prevent the spread of germs. You can decrease your risk for acute bronchitis and other illnesses by doing the following:     Wash your hands often with soap and water. Carry germ-killing hand lotion or gel with you. You can use the lotion or gel to clean your hands when soap and water are not available.          Do not touch your eyes, nose, or mouth unless you have washed your hands first.    Always cover your mouth when you cough to prevent the spread of germs. It is best to cough into a tissue or your shirt sleeve instead of into your hand. Ask those around you to cover their mouths when they cough. Try to avoid people who have a cold or the flu. If you are sick, stay away from others as much as possible. Avoid irritants in the air. Avoid chemicals, fumes, and dust. Wear a face mask if you must work around dust or fumes. Stay inside on days when air pollution levels are high. If you have allergies, stay inside when pollen counts are high. Do not use aerosol products, such as spray-on deodorant, bug spray, and hair spray. Do not smoke or be around others who are smoking. Nicotine and other chemicals in cigarettes and cigars can cause lung damage. Ask your healthcare provider for information if you currently smoke and need help to quit. E-cigarettes or smokeless tobacco still contain nicotine. Talk to your healthcare provider before you use these products. Follow up with your doctor as directed:  Write down questions you have so you will remember to ask them during your follow-up visits. © Copyright [employer] 2023 Information is for End User's use only and may not be sold, redistributed or otherwise used for commercial purposes. The above information is an  only. It is not intended as medical advice for individual conditions or treatments. Talk to your doctor, nurse or pharmacist before following any medical regimen to see if it is safe and effective for you.

## 2023-10-03 DIAGNOSIS — J20.9 ACUTE BRONCHITIS, UNSPECIFIED ORGANISM: ICD-10-CM

## 2023-10-03 RX ORDER — LEVALBUTEROL INHALATION SOLUTION 1.25 MG/3ML
1.25 SOLUTION RESPIRATORY (INHALATION) 3 TIMES DAILY
Qty: 90 ML | Refills: 3 | Status: SHIPPED | OUTPATIENT
Start: 2023-10-03

## 2023-10-10 ENCOUNTER — OFFICE VISIT (OUTPATIENT)
Dept: GASTROENTEROLOGY | Facility: CLINIC | Age: 58
End: 2023-10-10
Payer: COMMERCIAL

## 2023-10-10 VITALS
SYSTOLIC BLOOD PRESSURE: 124 MMHG | TEMPERATURE: 97.7 F | OXYGEN SATURATION: 95 % | BODY MASS INDEX: 39.27 KG/M2 | HEIGHT: 64 IN | DIASTOLIC BLOOD PRESSURE: 68 MMHG | HEART RATE: 80 BPM | WEIGHT: 230 LBS | RESPIRATION RATE: 16 BRPM

## 2023-10-10 DIAGNOSIS — K76.0 NAFLD (NONALCOHOLIC FATTY LIVER DISEASE): Primary | ICD-10-CM

## 2023-10-10 DIAGNOSIS — R11.2 NAUSEA AND VOMITING, UNSPECIFIED VOMITING TYPE: ICD-10-CM

## 2023-10-10 DIAGNOSIS — R10.13 EPIGASTRIC ABDOMINAL PAIN: ICD-10-CM

## 2023-10-10 DIAGNOSIS — R74.8 ELEVATED LIVER ENZYMES: ICD-10-CM

## 2023-10-10 PROCEDURE — 99244 OFF/OP CNSLTJ NEW/EST MOD 40: CPT | Performed by: FAMILY MEDICINE

## 2023-10-10 NOTE — H&P (VIEW-ONLY)
[facility] - Outpatient Consultation  Juancarlos Rendon 62 y.o. female MRN: [id number]  Encounter: [id number]          ASSESSMENT AND PLAN:    1. NAFLD (nonalcoholic fatty liver disease)  2. Elevated liver enzymes  3. BMI 39.0-39.9,adult  Patient with mildly elevated transaminases since December 2022 incidentally noted to have hepatomegaly and hepatic steatosis on CT chest.  No prior dedicated abdominal imaging available for review. No clinical, serologic or radiographic evidence of chronic liver disease. Suspect patient's abnormal liver function test to be secondary to NAFLD in the setting of multiple metabolic risk factors. Discussed the basic pathophysiology of fatty liver disease and the potential to progress to cirrhosis over time if left untreated. Ordered basic serologies in addition to serologies screening for viral hepatitis, evaluate for A1AT Pi* carrier status and assess patient's immunity status to hep A and B. She will also complete dedicated abdominal imaging with an abd U/S with elastography to better assess for advanced fibrosis. Discussed recommendations in regards to the treatment of fatty liver, particularly including steady and sustainable weight loss of approx 10-15% of patient's current body weight over a 6-12 month period. Patient is agreeable to referral to weight management, orders placed. Also recommended strict control of contributing comorbidities and limiting alcohol consumption. If patient's liver enzymes do not improve with weight loss, would recommend completing her serologic evaluation to r/o competing causes of liver disease (autoimmune hepatitis, hemochromatosis and Juve's disease).     NAFLD Fibrosis Score is: .17    NAFLD Score Correlated Fibrosis Severity   <-1.455 F0-F2   -1.455-0.676 Indeterminate Score   >0.676 F3-F4   **Fibrosis Severity Scale: F0 = no fibrosis; F1= mild fibrosis; F2 = moderate fibrosis; F3 = severe fibrosis; F4 = cirrhosis    - Comprehensive metabolic panel; Future  - Alpha 1 Antitrypsin Phenotype; Future  - Chronic Hepatitis Panel; Future  - Hepatitis A antibody, total; Future  - Hepatitis B surface antibody; Future  - US elastography/UGAP; Future  - US abdomen complete; Future  - Ambulatory Referral to Weight Management; Future    4. Epigastric abdominal pain  5. Nausea and vomiting, unspecified vomiting type  Patient with intermittent epigastric abdominal pain occurring approximately 4-5 hours after eating 2-3x weekly occasionally resulting in nausea and vomiting. Not associated with overt heartburn/reflux, early satiety or additional alarm features. The etiology of patient's symptoms is currently unclear. Possibly related to silent GERD vs reported biliary dyskinesia. Also possibly related to gastroparesis in the setting of uncontrolled DM2 but would expect to report early satiety. Therefore, recommended EGD for further investigation (esophagitis, gastritis, PUD, infection with H. pylori or other outlet obstruction/luminal abnormality). If EGD is unremarkable could consider a gastric emptying study to evaluate for gastroparesis. - EGD; Future     6. Screening for colon cancer  Patient is up-to-date with CRC screening having completed a Cologuard in 7/2021 which was negative. Patient will be due for a repeat Cologuard in 7/2024. Follow-up in 3 months or sooner if necessary. ______________________________________________________________________    HPI: Patient is a 62 y.o. female PMH significant for anxiety, asthma, DM2, NICKY on CPAP and HLD who presents today for a consultation regarding elevated liver enzymes and a fatty appearing liver. Patient was noted to have abnormal liver function tests including a mildly elevated serum ALT on routine serologies in December 2022.  Per chart review, she is noted to have a prior CT chest which did visualize her liver notable for hepatomegaly and hepatic steatosis. Serologies without thrombocytopenia or hypoalbuminemia. In regards to metabolic risk factors, patient's BMI is 39.79 with DM2 and hyperlipidemia. Admits to rare alcohol use, 1-2 hard ciders monthly. Denies personal history of chronic liver disease. Denies a family history of liver disease or liver-related cancer. Denies a known past or current infection with viral hepatitis. Denies being treated for any autoimmune conditions. Denies taking any new medications, herbal supplements or performance-enhancing drugs not reflected on her med list. Started taking Lipitor Januvia 2 weeks prior but her abnormal liver function tests were first seen in December 2022. Denies excessive Tylenol use. Denies any liver specific complaints but does report epigastric abdominal pain 2-3 times weekly. States this typically occurs 4 to 5 hours after eating and will occasionally resolve on her own or resulted in her throwing up. Denies this being related with overt heartburn. She does report a diagnosis of biliary dyskinesia for which she has not had her gallbladder removed. Denies dysphagia/odynophagia, early satiety, other abdominal pain, overt GI bleeding or unintentional weight loss. REVIEW OF SYSTEMS:    CONSTITUTIONAL: Denies any fever, chills, rigors, and weight loss. HEENT: No earache or tinnitus. Denies hearing loss or visual disturbances. CARDIOVASCULAR: No chest pain or palpitations. RESPIRATORY: Denies any cough, hemoptysis, shortness of breath or dyspnea on exertion. GASTROINTESTINAL: As noted in the History of Present Illness. GENITOURINARY: No problems with urination. Denies any hematuria or dysuria. NEUROLOGIC: No dizziness or vertigo, denies headaches. MUSCULOSKELETAL: Denies any muscle or joint pain. SKIN: Denies skin rashes or itching. ENDOCRINE: Denies excessive thirst. Denies intolerance to heat or cold. PSYCHOSOCIAL: Denies depression or anxiety. Denies any recent memory loss. Historical Information   Past Medical History:   Diagnosis Date   • Anxiety 2020   • Arthritis    • Asthma    • Bile duct stricture    • Class 2 obesity due to excess calories in adult 2021   • Contusion of ribs, right, initial encounter 2021   • CPAP (continuous positive airway pressure) dependence    • Diabetes mellitus ([address])    • Fatty liver    • Functional urinary incontinence 2020   • Hyperlipidemia    • Hypertension    • Kidney stone    • Moderate persistent asthma with acute exacerbation 2020   • Obesity    • Sleep apnea    • Trigger ring finger of right hand 12/15/2022   • Type 2 diabetes mellitus without complication, without long-term current use of insulin ([address]) 2020   • Vitamin D deficiency      Past Surgical History:   Procedure Laterality Date   • COLONOSCOPY  2015    tiny polyp=next    • COLONOSCOPY     • CYST REMOVAL      thumb cyst   • HYSTEROSCOPY  2008    with biopsy   • MAMMO (HISTORICAL)  2016    [facility]   • OTHER SURGICAL HISTORY  1998    tongue papiloma   • LA TENDON SHEATH INCISION Right 2023    Procedure: RELEASE TRIGGER FINGER-right ring finger;  Surgeon: Emanuel Dale;  Location:  MAIN OR;  Service: Orthopedics   • TONSILLECTOMY     • UPPER GASTROINTESTINAL ENDOSCOPY       Social History   Social History     Substance and Sexual Activity   Alcohol Use Yes    Comment: Three to four per month hard cider.      Social History     Substance and Sexual Activity   Drug Use Never     Social History     Tobacco Use   Smoking Status Never   Smokeless Tobacco Never   Tobacco Comments    Secondhand smoke growing up     Family History   Problem Relation Age of Onset   • Diabetes Mother    • Hypertension Mother    • Depression Mother          age 58   • Arthritis Mother    • Hyperlipidemia Mother    • Heart attack Mother    • Rheum arthritis Mother    • COPD Mother    • Heart disease Mother    • Mental illness Mother    • Hypertension Father    • Depression Father          age 80   • Arthritis Father    • Rheum arthritis Father    • ADD / ADHD Father    • Anxiety disorder Father    • COPD Father    • Heart disease Father    • Mental illness Father    • Depression Brother          age 46   • Arthritis Brother    • Cancer Brother 52        malignant neoplasm of lip, oral cavity, and pharynx   • Drug abuse Brother    • Substance Abuse Brother    • Bipolar disorder Brother    • Suicide Attempts Brother    • Mental illness Brother    • Breast cancer Maternal Aunt 30   • Breast cancer Maternal Aunt 30   • Breast cancer Maternal Aunt 72   • Brain cancer Paternal Aunt    • No Known Problems Paternal Aunt    • No Known Problems Paternal Aunt    • No Known Problems Paternal Aunt    • No Known Problems Paternal Aunt        Meds/Allergies       Current Outpatient Medications:   •  acetaminophen (TYLENOL) 325 mg tablet  •  Albuterol (PROVENTIL IN)  •  Apple Cider Vinegar 600 MG CAPS  •  atorvastatin (LIPITOR) 10 mg tablet  •  b complex vitamins tablet  •  BORON PO  •  cholecalciferol (VITAMIN D3) 1,000 units tablet  •  Chromium Picolinate 1000 MCG TABS  •  COLLAGEN PO  •  EPINEPHrine (EPIPEN JR) 0.15 mg/0.3 mL SOAJ  •  escitalopram (LEXAPRO) 10 mg tablet  •  fluticasone (FLONASE) 50 mcg/act nasal spray  •  Loratadine 10 MG CAPS  •  Magnesium 500 MG TABS  •  metFORMIN (GLUCOPHAGE-XR) 500 mg 24 hr tablet  •  Omega-3 Fatty Acids (Fish Oil) 1200 MG CPDR  •  oxybutynin (DITROPAN XL) 15 MG 24 hr tablet  •  sitaGLIPtin (JANUVIA) 25 mg tablet  •  benzonatate (TESSALON) 200 MG capsule  •  levalbuterol (Xopenex) 1.25 mg/3 mL nebulizer solution  •  methylPREDNISolone 4 MG tablet therapy pack    Allergies   Allergen Reactions   • Bee Venom Anaphylaxis, Hives, Shortness Of Breath, Throat Swelling and Wheezing   • Pneumococcal Vaccines Hives     Anaphylaxis as per pt   • Other Rash and Sneezing     Beech, birch (rash), maple tree pollen. Dog and cat danger. Objective     Blood pressure 124/68, pulse 80, temperature 97.7 °F (36.5 °C), temperature source Temporal, resp. rate 16, height 5' 3.75" (1.619 m), weight 104 kg (230 lb), SpO2 95 %. Body mass index is 39.79 kg/m². PHYSICAL EXAM:      General Appearance:   Alert, cooperative, no distress   HEENT:   Normocephalic, atraumatic, anicteric. Neck:  Supple, symmetrical, trachea midline   Lungs:   Clear to auscultation bilaterally; no rales, rhonchi or wheezing; respirations unlabored    Heart[de-identified]   Regular rate and rhythm; no murmur, rub, or gallop. Abdomen:   Soft, non-tender, non-distended; normal bowel sounds; no masses, no organomegaly    Genitalia:   Deferred    Rectal:   Deferred    Extremities:  No cyanosis, clubbing or edema    Pulses:  2+ and symmetric    Skin:  No jaundice, rashes, or lesions    Lymph nodes:  No palpable cervical lymphadenopathy        Lab Results:   No visits with results within 1 Day(s) from this visit. Latest known visit with results is:   Telephone on 09/20/2023   Component Date Value   • Severity 09/28/2022 Normal    • Right Eye Diabetic Retin* 09/28/2022 None    • Left Eye Diabetic Retino* 09/28/2022 None          Radiology Results:   Mammo screening bilateral w 3d & cad    Result Date: 9/27/2023  Narrative: DIAGNOSIS: Encounter for screening mammogram for breast cancer TECHNIQUE: Digital screening mammography was performed. Computer Aided Detection (CAD) analyzed all applicable images. COMPARISONS: Prior breast imaging dated: 07/12/2022, 09/25/2019, and 09/14/2018 RELEVANT HISTORY: Family Breast Cancer History: History of breast cancer in Maternal Aunt, Maternal Aunt, Maternal Aunt. Family Medical History: Family medical history includes breast cancer in 3 relatives (maternal aunt, maternal aunt, maternal aunt). Personal History: Hormone history includes birth control. No known relevant surgical history.  No known relevant medical history. The patient is scheduled in a reminder system for screening mammography. 8-10% of cancers will be missed on mammography. Management of a palpable abnormality must be based on clinical grounds. Patients will be notified of their results via letter from our facility. Accredited by [employer] of Radiology and FDA. RISK ASSESSMENT: 5 Year Tyrer-Cuzick: 1.56 % 10 Year Tyrer-Cuzick: 3.4 % Lifetime Tyrer-Cuzick: 9.16 % TISSUE DENSITY: The breasts are almost entirely fatty. INDICATION: Ananth Alegria is a 62 y.o. female presenting for screening mammography. FINDINGS: There are no suspicious masses, grouped microcalcifications or areas of architectural distortion. The skin and nipple areolar complex are unremarkable. Impression: No mammographic evidence of malignancy. ASSESSMENT/BI-RADS CATEGORY: Left: 1 - Negative Right: 1 - Negative Overall: 1 - Negative RECOMMENDATION:      - Routine screening mammogram in 1 year for both breasts. Workstation ID: [id number]       Fern Lundborg, PA-C     **Please note: Dictation voice to text software may have been used in the creation of this record. Occasional wrong word or “sound alike” substitutions may have occurred due to the inherent limitations of voice recognition software. Read the chart carefully and recognize, using context, where substitutions have occurred. **

## 2023-10-10 NOTE — PROGRESS NOTES
[facility] - Outpatient Consultation  Silvestre Palomo 62 y.o. female MRN: [id number]  Encounter: [id number]          ASSESSMENT AND PLAN:    1. NAFLD (nonalcoholic fatty liver disease)  2. Elevated liver enzymes  3. BMI 39.0-39.9,adult  Patient with mildly elevated transaminases since December 2022 incidentally noted to have hepatomegaly and hepatic steatosis on CT chest.  No prior dedicated abdominal imaging available for review. No clinical, serologic or radiographic evidence of chronic liver disease. Suspect patient's abnormal liver function test to be secondary to NAFLD in the setting of multiple metabolic risk factors. Discussed the basic pathophysiology of fatty liver disease and the potential to progress to cirrhosis over time if left untreated. Ordered basic serologies in addition to serologies screening for viral hepatitis, evaluate for A1AT Pi* carrier status and assess patient's immunity status to hep A and B. She will also complete dedicated abdominal imaging with an abd U/S with elastography to better assess for advanced fibrosis. Discussed recommendations in regards to the treatment of fatty liver, particularly including steady and sustainable weight loss of approx 10-15% of patient's current body weight over a 6-12 month period. Patient is agreeable to referral to weight management, orders placed. Also recommended strict control of contributing comorbidities and limiting alcohol consumption. If patient's liver enzymes do not improve with weight loss, would recommend completing her serologic evaluation to r/o competing causes of liver disease (autoimmune hepatitis, hemochromatosis and Juve's disease).     NAFLD Fibrosis Score is: .17    NAFLD Score Correlated Fibrosis Severity   <-1.455 F0-F2   -1.455-0.676 Indeterminate Score   >0.676 F3-F4   **Fibrosis Severity Scale: F0 = no fibrosis; F1= mild fibrosis; F2 = moderate fibrosis; F3 = severe fibrosis; F4 = cirrhosis    - Comprehensive metabolic panel; Future  - Alpha 1 Antitrypsin Phenotype; Future  - Chronic Hepatitis Panel; Future  - Hepatitis A antibody, total; Future  - Hepatitis B surface antibody; Future  - US elastography/UGAP; Future  - US abdomen complete; Future  - Ambulatory Referral to Weight Management; Future    4. Epigastric abdominal pain  5. Nausea and vomiting, unspecified vomiting type  Patient with intermittent epigastric abdominal pain occurring approximately 4-5 hours after eating 2-3x weekly occasionally resulting in nausea and vomiting. Not associated with overt heartburn/reflux, early satiety or additional alarm features. The etiology of patient's symptoms is currently unclear. Possibly related to silent GERD vs reported biliary dyskinesia. Also possibly related to gastroparesis in the setting of uncontrolled DM2 but would expect to report early satiety. Therefore, recommended EGD for further investigation (esophagitis, gastritis, PUD, infection with H. pylori or other outlet obstruction/luminal abnormality). If EGD is unremarkable could consider a gastric emptying study to evaluate for gastroparesis. - EGD; Future     6. Screening for colon cancer  Patient is up-to-date with CRC screening having completed a Cologuard in 7/2021 which was negative. Patient will be due for a repeat Cologuard in 7/2024. Follow-up in 3 months or sooner if necessary. ______________________________________________________________________    HPI: Patient is a 62 y.o. female PMH significant for anxiety, asthma, DM2, NICKY on CPAP and HLD who presents today for a consultation regarding elevated liver enzymes and a fatty appearing liver. Patient was noted to have abnormal liver function tests including a mildly elevated serum ALT on routine serologies in December 2022.  Per chart review, she is noted to have a prior CT chest which did visualize her liver notable for hepatomegaly and hepatic steatosis. Serologies without thrombocytopenia or hypoalbuminemia. In regards to metabolic risk factors, patient's BMI is 39.79 with DM2 and hyperlipidemia. Admits to rare alcohol use, 1-2 hard ciders monthly. Denies personal history of chronic liver disease. Denies a family history of liver disease or liver-related cancer. Denies a known past or current infection with viral hepatitis. Denies being treated for any autoimmune conditions. Denies taking any new medications, herbal supplements or performance-enhancing drugs not reflected on her med list. Started taking Lipitor Januvia 2 weeks prior but her abnormal liver function tests were first seen in December 2022. Denies excessive Tylenol use. Denies any liver specific complaints but does report epigastric abdominal pain 2-3 times weekly. States this typically occurs 4 to 5 hours after eating and will occasionally resolve on her own or resulted in her throwing up. Denies this being related with overt heartburn. She does report a diagnosis of biliary dyskinesia for which she has not had her gallbladder removed. Denies dysphagia/odynophagia, early satiety, other abdominal pain, overt GI bleeding or unintentional weight loss. REVIEW OF SYSTEMS:    CONSTITUTIONAL: Denies any fever, chills, rigors, and weight loss. HEENT: No earache or tinnitus. Denies hearing loss or visual disturbances. CARDIOVASCULAR: No chest pain or palpitations. RESPIRATORY: Denies any cough, hemoptysis, shortness of breath or dyspnea on exertion. GASTROINTESTINAL: As noted in the History of Present Illness. GENITOURINARY: No problems with urination. Denies any hematuria or dysuria. NEUROLOGIC: No dizziness or vertigo, denies headaches. MUSCULOSKELETAL: Denies any muscle or joint pain. SKIN: Denies skin rashes or itching. ENDOCRINE: Denies excessive thirst. Denies intolerance to heat or cold. PSYCHOSOCIAL: Denies depression or anxiety. Denies any recent memory loss. Historical Information   Past Medical History:   Diagnosis Date   • Anxiety 2020   • Arthritis    • Asthma    • Bile duct stricture    • Class 2 obesity due to excess calories in adult 2021   • Contusion of ribs, right, initial encounter 2021   • CPAP (continuous positive airway pressure) dependence    • Diabetes mellitus ([address])    • Fatty liver    • Functional urinary incontinence 2020   • Hyperlipidemia    • Hypertension    • Kidney stone    • Moderate persistent asthma with acute exacerbation 2020   • Obesity    • Sleep apnea    • Trigger ring finger of right hand 12/15/2022   • Type 2 diabetes mellitus without complication, without long-term current use of insulin ([address]) 2020   • Vitamin D deficiency      Past Surgical History:   Procedure Laterality Date   • COLONOSCOPY  2015    tiny polyp=next    • COLONOSCOPY     • CYST REMOVAL      thumb cyst   • HYSTEROSCOPY  2008    with biopsy   • MAMMO (HISTORICAL)  2016    [facility]   • OTHER SURGICAL HISTORY  1998    tongue papiloma   • MI TENDON SHEATH INCISION Right 2023    Procedure: RELEASE TRIGGER FINGER-right ring finger;  Surgeon: Sofía Rai;  Location:  MAIN OR;  Service: Orthopedics   • TONSILLECTOMY     • UPPER GASTROINTESTINAL ENDOSCOPY       Social History   Social History     Substance and Sexual Activity   Alcohol Use Yes    Comment: Three to four per month hard cider.      Social History     Substance and Sexual Activity   Drug Use Never     Social History     Tobacco Use   Smoking Status Never   Smokeless Tobacco Never   Tobacco Comments    Secondhand smoke growing up     Family History   Problem Relation Age of Onset   • Diabetes Mother    • Hypertension Mother    • Depression Mother          age 58   • Arthritis Mother    • Hyperlipidemia Mother    • Heart attack Mother    • Rheum arthritis Mother    • COPD Mother    • Heart disease Mother    • Mental illness Mother    • Hypertension Father    • Depression Father          age 80   • Arthritis Father    • Rheum arthritis Father    • ADD / ADHD Father    • Anxiety disorder Father    • COPD Father    • Heart disease Father    • Mental illness Father    • Depression Brother          age 46   • Arthritis Brother    • Cancer Brother 52        malignant neoplasm of lip, oral cavity, and pharynx   • Drug abuse Brother    • Substance Abuse Brother    • Bipolar disorder Brother    • Suicide Attempts Brother    • Mental illness Brother    • Breast cancer Maternal Aunt 30   • Breast cancer Maternal Aunt 30   • Breast cancer Maternal Aunt 72   • Brain cancer Paternal Aunt    • No Known Problems Paternal Aunt    • No Known Problems Paternal Aunt    • No Known Problems Paternal Aunt    • No Known Problems Paternal Aunt        Meds/Allergies       Current Outpatient Medications:   •  acetaminophen (TYLENOL) 325 mg tablet  •  Albuterol (PROVENTIL IN)  •  Apple Cider Vinegar 600 MG CAPS  •  atorvastatin (LIPITOR) 10 mg tablet  •  b complex vitamins tablet  •  BORON PO  •  cholecalciferol (VITAMIN D3) 1,000 units tablet  •  Chromium Picolinate 1000 MCG TABS  •  COLLAGEN PO  •  EPINEPHrine (EPIPEN JR) 0.15 mg/0.3 mL SOAJ  •  escitalopram (LEXAPRO) 10 mg tablet  •  fluticasone (FLONASE) 50 mcg/act nasal spray  •  Loratadine 10 MG CAPS  •  Magnesium 500 MG TABS  •  metFORMIN (GLUCOPHAGE-XR) 500 mg 24 hr tablet  •  Omega-3 Fatty Acids (Fish Oil) 1200 MG CPDR  •  oxybutynin (DITROPAN XL) 15 MG 24 hr tablet  •  sitaGLIPtin (JANUVIA) 25 mg tablet  •  benzonatate (TESSALON) 200 MG capsule  •  levalbuterol (Xopenex) 1.25 mg/3 mL nebulizer solution  •  methylPREDNISolone 4 MG tablet therapy pack    Allergies   Allergen Reactions   • Bee Venom Anaphylaxis, Hives, Shortness Of Breath, Throat Swelling and Wheezing   • Pneumococcal Vaccines Hives     Anaphylaxis as per pt   • Other Rash and Sneezing     Beech, birch (rash), maple tree pollen. Dog and cat danger. Objective     Blood pressure 124/68, pulse 80, temperature 97.7 °F (36.5 °C), temperature source Temporal, resp. rate 16, height 5' 3.75" (1.619 m), weight 104 kg (230 lb), SpO2 95 %. Body mass index is 39.79 kg/m². PHYSICAL EXAM:      General Appearance:   Alert, cooperative, no distress   HEENT:   Normocephalic, atraumatic, anicteric. Neck:  Supple, symmetrical, trachea midline   Lungs:   Clear to auscultation bilaterally; no rales, rhonchi or wheezing; respirations unlabored    Heart[de-identified]   Regular rate and rhythm; no murmur, rub, or gallop. Abdomen:   Soft, non-tender, non-distended; normal bowel sounds; no masses, no organomegaly    Genitalia:   Deferred    Rectal:   Deferred    Extremities:  No cyanosis, clubbing or edema    Pulses:  2+ and symmetric    Skin:  No jaundice, rashes, or lesions    Lymph nodes:  No palpable cervical lymphadenopathy        Lab Results:   No visits with results within 1 Day(s) from this visit. Latest known visit with results is:   Telephone on 09/20/2023   Component Date Value   • Severity 09/28/2022 Normal    • Right Eye Diabetic Retin* 09/28/2022 None    • Left Eye Diabetic Retino* 09/28/2022 None          Radiology Results:   Mammo screening bilateral w 3d & cad    Result Date: 9/27/2023  Narrative: DIAGNOSIS: Encounter for screening mammogram for breast cancer TECHNIQUE: Digital screening mammography was performed. Computer Aided Detection (CAD) analyzed all applicable images. COMPARISONS: Prior breast imaging dated: 07/12/2022, 09/25/2019, and 09/14/2018 RELEVANT HISTORY: Family Breast Cancer History: History of breast cancer in Maternal Aunt, Maternal Aunt, Maternal Aunt. Family Medical History: Family medical history includes breast cancer in 3 relatives (maternal aunt, maternal aunt, maternal aunt). Personal History: Hormone history includes birth control. No known relevant surgical history.  No known relevant medical history. The patient is scheduled in a reminder system for screening mammography. 8-10% of cancers will be missed on mammography. Management of a palpable abnormality must be based on clinical grounds. Patients will be notified of their results via letter from our facility. Accredited by [employer] of Radiology and FDA. RISK ASSESSMENT: 5 Year Tyrer-Cuzick: 1.56 % 10 Year Tyrer-Cuzick: 3.4 % Lifetime Tyrer-Cuzick: 9.16 % TISSUE DENSITY: The breasts are almost entirely fatty. INDICATION: Wilfredo Mckeon is a 62 y.o. female presenting for screening mammography. FINDINGS: There are no suspicious masses, grouped microcalcifications or areas of architectural distortion. The skin and nipple areolar complex are unremarkable. Impression: No mammographic evidence of malignancy. ASSESSMENT/BI-RADS CATEGORY: Left: 1 - Negative Right: 1 - Negative Overall: 1 - Negative RECOMMENDATION:      - Routine screening mammogram in 1 year for both breasts. Workstation ID: [id number]       Dane Nowak PA-C     **Please note: Dictation voice to text software may have been used in the creation of this record. Occasional wrong word or “sound alike” substitutions may have occurred due to the inherent limitations of voice recognition software. Read the chart carefully and recognize, using context, where substitutions have occurred. **

## 2023-10-25 ENCOUNTER — HOSPITAL ENCOUNTER (OUTPATIENT)
Dept: GASTROENTEROLOGY | Facility: HOSPITAL | Age: 58
Setting detail: OUTPATIENT SURGERY
Discharge: HOME | End: 2023-10-25

## 2023-10-25 DIAGNOSIS — R10.13 EPIGASTRIC ABDOMINAL PAIN: ICD-10-CM

## 2023-10-25 DIAGNOSIS — R11.2 NAUSEA AND VOMITING, UNSPECIFIED VOMITING TYPE: ICD-10-CM

## 2023-10-26 ENCOUNTER — HOSPITAL ENCOUNTER (OUTPATIENT)
Dept: ULTRASOUND IMAGING | Facility: HOSPITAL | Age: 58
Discharge: HOME | End: 2023-10-26
Payer: COMMERCIAL

## 2023-10-26 DIAGNOSIS — R74.8 ELEVATED LIVER ENZYMES: ICD-10-CM

## 2023-10-26 DIAGNOSIS — K76.0 NAFLD (NONALCOHOLIC FATTY LIVER DISEASE): ICD-10-CM

## 2023-10-26 PROCEDURE — 76700 US EXAM ABDOM COMPLETE: CPT

## 2023-10-26 PROCEDURE — 76981 USE PARENCHYMA: CPT

## 2023-10-27 ENCOUNTER — PREP FOR PROCEDURE (OUTPATIENT)
Age: 58
End: 2023-10-27

## 2023-10-27 ENCOUNTER — TELEPHONE (OUTPATIENT)
Age: 58
End: 2023-10-27

## 2023-10-27 DIAGNOSIS — R10.13 EPIGASTRIC ABDOMINAL PAIN: Primary | ICD-10-CM

## 2023-11-02 ENCOUNTER — ANESTHESIA (OUTPATIENT)
Dept: GASTROENTEROLOGY | Facility: HOSPITAL | Age: 58
End: 2023-11-02

## 2023-11-02 ENCOUNTER — VBI (OUTPATIENT)
Dept: ADMINISTRATIVE | Facility: OTHER | Age: 58
End: 2023-11-02

## 2023-11-02 ENCOUNTER — ANESTHESIA EVENT (OUTPATIENT)
Dept: GASTROENTEROLOGY | Facility: HOSPITAL | Age: 58
End: 2023-11-02

## 2023-11-02 ENCOUNTER — HOSPITAL ENCOUNTER (OUTPATIENT)
Dept: GASTROENTEROLOGY | Facility: HOSPITAL | Age: 58
Setting detail: OUTPATIENT SURGERY
End: 2023-11-02
Attending: INTERNAL MEDICINE
Payer: COMMERCIAL

## 2023-11-02 VITALS
HEART RATE: 72 BPM | DIASTOLIC BLOOD PRESSURE: 62 MMHG | RESPIRATION RATE: 18 BRPM | SYSTOLIC BLOOD PRESSURE: 110 MMHG | TEMPERATURE: 97.8 F | OXYGEN SATURATION: 96 %

## 2023-11-02 DIAGNOSIS — R10.13 EPIGASTRIC ABDOMINAL PAIN: ICD-10-CM

## 2023-11-02 LAB — GLUCOSE SERPL-MCNC: 159 MG/DL (ref 65–140)

## 2023-11-02 PROCEDURE — 82948 REAGENT STRIP/BLOOD GLUCOSE: CPT

## 2023-11-02 PROCEDURE — 43239 EGD BIOPSY SINGLE/MULTIPLE: CPT | Performed by: INTERNAL MEDICINE

## 2023-11-02 PROCEDURE — 88305 TISSUE EXAM BY PATHOLOGIST: CPT | Performed by: PATHOLOGY

## 2023-11-02 RX ORDER — SODIUM CHLORIDE, SODIUM LACTATE, POTASSIUM CHLORIDE, CALCIUM CHLORIDE 600; 310; 30; 20 MG/100ML; MG/100ML; MG/100ML; MG/100ML
100 INJECTION, SOLUTION INTRAVENOUS CONTINUOUS
Status: DISPENSED | OUTPATIENT
Start: 2023-11-02

## 2023-11-02 RX ORDER — PROPOFOL 10 MG/ML
INJECTION, EMULSION INTRAVENOUS AS NEEDED
Status: DISCONTINUED | OUTPATIENT
Start: 2023-11-02 | End: 2023-11-02

## 2023-11-02 RX ORDER — OMEPRAZOLE 40 MG/1
40 CAPSULE, DELAYED RELEASE ORAL
Qty: 90 CAPSULE | Refills: 1 | Status: SHIPPED | OUTPATIENT
Start: 2023-11-02

## 2023-11-02 RX ORDER — LIDOCAINE HYDROCHLORIDE 20 MG/ML
INJECTION, SOLUTION EPIDURAL; INFILTRATION; INTRACAUDAL; PERINEURAL AS NEEDED
Status: DISCONTINUED | OUTPATIENT
Start: 2023-11-02 | End: 2023-11-02

## 2023-11-02 RX ADMIN — SODIUM CHLORIDE, SODIUM LACTATE, POTASSIUM CHLORIDE, AND CALCIUM CHLORIDE: .6; .31; .03; .02 INJECTION, SOLUTION INTRAVENOUS at 12:26

## 2023-11-02 RX ADMIN — PROPOFOL 100 MG: 10 INJECTION, EMULSION INTRAVENOUS at 12:40

## 2023-11-02 RX ADMIN — LIDOCAINE HYDROCHLORIDE 100 MG: 20 INJECTION, SOLUTION EPIDURAL; INFILTRATION; INTRACAUDAL; PERINEURAL at 12:34

## 2023-11-02 RX ADMIN — PROPOFOL 150 MG: 10 INJECTION, EMULSION INTRAVENOUS at 12:37

## 2023-11-02 RX ADMIN — SODIUM CHLORIDE, SODIUM LACTATE, POTASSIUM CHLORIDE, AND CALCIUM CHLORIDE 100 ML/HR: .6; .31; .03; .02 INJECTION, SOLUTION INTRAVENOUS at 12:25

## 2023-11-02 NOTE — INTERVAL H&P NOTE
H&P reviewed. After examining the patient I find no changes in the patients condition since the H&P had been written. Pt is here for EGD for epigastric pain that occurs 4-5h postprandially, w/ associated occasional N/V and early satiety.     Vitals:    11/02/23 1213   BP: 136/66   Pulse: 65   Resp: 18   Temp: 97.8 °F (36.6 °C)   SpO2: 97%

## 2023-11-02 NOTE — ANESTHESIA POSTPROCEDURE EVALUATION
Post-Op Assessment Note    CV Status:  Stable  Pain Score: 0    Pain management: adequate     Mental Status:  Sleepy   Hydration Status:  Euvolemic   PONV Controlled:  Controlled   Airway Patency:  Patent      Post Op Vitals Reviewed: Yes      Staff: CRNA     No notable events documented.     BP   104/53   Temp      Pulse  75   Resp   12   SpO2   97

## 2023-11-02 NOTE — ANESTHESIA PREPROCEDURE EVALUATION
Procedure:  EGD    Relevant Problems   CARDIO   (+) Mixed hyperlipidemia      ENDO   (+) Type 2 diabetes mellitus without complication, without long-term current use of insulin (HCC)      GI/HEPATIC   (+) Fatty liver      NEURO/PSYCH   (+) Anxiety      PULMONARY   (+) Moderate persistent asthma with acute exacerbation   (+) NICKY on CPAP        Physical Exam    Airway    Mallampati score: I  TM Distance: >3 FB  Neck ROM: full     Dental   No notable dental hx     Cardiovascular      Pulmonary      Other Findings        Anesthesia Plan  ASA Score- 2     Anesthesia Type- IV sedation with anesthesia with ASA Monitors. Additional Monitors:     Airway Plan:            Plan Factors-Exercise tolerance (METS): >4 METS. Chart reviewed. Existing labs reviewed. Patient summary reviewed. Induction- intravenous. Postoperative Plan-     Informed Consent- Anesthetic plan and risks discussed with patient. I personally reviewed this patient with the CRNA. Discussed and agreed on the Anesthesia Plan with the CRNA. Trisha Cooney

## 2023-11-07 PROCEDURE — 88305 TISSUE EXAM BY PATHOLOGIST: CPT | Performed by: PATHOLOGY

## 2023-11-28 DIAGNOSIS — R32 URINARY INCONTINENCE, UNSPECIFIED TYPE: ICD-10-CM

## 2023-11-28 DIAGNOSIS — E78.2 MIXED HYPERLIPIDEMIA: ICD-10-CM

## 2023-11-28 RX ORDER — ATORVASTATIN CALCIUM 10 MG/1
10 TABLET, FILM COATED ORAL DAILY
Qty: 30 TABLET | Refills: 0 | Status: SHIPPED | OUTPATIENT
Start: 2023-11-28

## 2023-11-28 RX ORDER — OXYBUTYNIN CHLORIDE 15 MG/1
15 TABLET, EXTENDED RELEASE ORAL
Qty: 90 TABLET | Refills: 1 | Status: SHIPPED | OUTPATIENT
Start: 2023-11-28

## 2023-12-06 ENCOUNTER — VBI (OUTPATIENT)
Dept: ADMINISTRATIVE | Facility: OTHER | Age: 58
End: 2023-12-06

## 2023-12-07 ENCOUNTER — RA CDI HCC (OUTPATIENT)
Dept: OTHER | Facility: HOSPITAL | Age: 58
End: 2023-12-07

## 2023-12-07 NOTE — PROGRESS NOTES
[facility] [address] [facility] coding opportunities       Chart reviewed, no opportunity found: CHART REVIEWED, NO OPPORTUNITY FOUND        Patients Insurance        Commercial Insurance: Kit Calles

## 2023-12-14 ENCOUNTER — CONSULT (OUTPATIENT)
Dept: BARIATRICS | Facility: CLINIC | Age: 58
End: 2023-12-14
Payer: COMMERCIAL

## 2023-12-14 VITALS
HEIGHT: 64 IN | SYSTOLIC BLOOD PRESSURE: 124 MMHG | BODY MASS INDEX: 39.13 KG/M2 | TEMPERATURE: 98 F | WEIGHT: 229.2 LBS | RESPIRATION RATE: 20 BRPM | DIASTOLIC BLOOD PRESSURE: 78 MMHG | HEART RATE: 75 BPM | OXYGEN SATURATION: 97 %

## 2023-12-14 DIAGNOSIS — K76.0 NAFLD (NONALCOHOLIC FATTY LIVER DISEASE): ICD-10-CM

## 2023-12-14 DIAGNOSIS — E78.2 MIXED HYPERLIPIDEMIA: ICD-10-CM

## 2023-12-14 DIAGNOSIS — G47.33 OSA ON CPAP: ICD-10-CM

## 2023-12-14 DIAGNOSIS — K76.0 FATTY LIVER: ICD-10-CM

## 2023-12-14 DIAGNOSIS — E11.9 TYPE 2 DIABETES MELLITUS WITHOUT COMPLICATION, WITHOUT LONG-TERM CURRENT USE OF INSULIN (HCC): ICD-10-CM

## 2023-12-14 DIAGNOSIS — E66.01 MORBID OBESITY (HCC): Primary | ICD-10-CM

## 2023-12-14 PROCEDURE — 99244 OFF/OP CNSLTJ NEW/EST MOD 40: CPT | Performed by: PHYSICIAN ASSISTANT

## 2023-12-14 NOTE — ASSESSMENT & PLAN NOTE
Lab Results   Component Value Date    HGBA1C 8.6 06/22/2023   -uncontrolled  -On Metformin XR and Januvia  -avoid/limit refined carbohydrates  -can improve with weight loss  -can consider GLP-1

## 2023-12-14 NOTE — PROGRESS NOTES
Assessment/Plan: Morbid obesity ([address])  -Discussed options of [facility]-Intensive Lifestyle Intervention Program, Very Low Calorie Diet-VLCD, Conservative Program, Martha-En-Y Gastric Bypass, and Vertical Sleeve Gastrectomy and the role of weight loss medications.  -Initial weight loss goal of 5-10% weight loss for improved health  -Screening labs: reviewed CMP, Lipid panel, TSH and HgbA1c  -Patient is interested in pursuing  bariatric surgery  -Will have [facility] office contact patient    Type 2 diabetes mellitus without complication, without long-term current use of insulin ([address])    Lab Results   Component Value Date    HGBA1C 8.6 06/22/2023   -uncontrolled  -On Metformin XR and Januvia  -avoid/limit refined carbohydrates  -can improve with weight loss  -can consider GLP-1    Fatty liver  -LFTs elevated  -following with GI  -recommend minimum 10% TBW loss    Mixed hyperlipidemia  -On Lipitor  -can improve with dietary changes, regular cardiovascular exercise and weight loss    NICKY on CPAP  -compliant with CPAP  -can improve with weight loss      Goals:    Food log (ie.) [email or web address].   No sugary beverages. At least 64oz of water daily. Increase physical activity by 10 minutes daily.  Gradually increase physical activity to a goal of 5 days per week for 30 minutes of MODERATE intensity PLUS 2 days per week of FULL BODY resistance training      Diagnoses and all orders for this visit:    Morbid obesity ([address])    Type 2 diabetes mellitus without complication, without long-term current use of insulin ([address])    Fatty liver    Mixed hyperlipidemia    NAFLD (nonalcoholic fatty liver disease)  -     Ambulatory Referral to Weight Management    BMI 39.0-39.9,adult  -     Ambulatory Referral to Weight Management    NICKY on CPAP          Subjective:   Chief Complaint   Patient presents with    Consult       Patient ID: Dallas Salazar  is a 62 y.o. female with excess weight/obesity here to pursue weight managment. Past Medical History:   Diagnosis Date    Anxiety 09/16/2020    Arthritis     Asthma     Bile duct stricture     Class 2 obesity due to excess calories in adult 02/24/2021    Contusion of ribs, right, initial encounter 09/22/2021    CPAP (continuous positive airway pressure) dependence     Diabetes mellitus ([address])     Fatty liver     Functional urinary incontinence 09/14/2020    Hyperlipidemia     Hypertension     Kidney stone     Moderate persistent asthma with acute exacerbation 09/16/2020    Obesity     Sleep apnea     Trigger ring finger of right hand 12/15/2022    Type 2 diabetes mellitus without complication, without long-term current use of insulin ([address]) 09/16/2020    Vitamin D deficiency          HPI:  Obesity/Excess Weight:  Severity: Severe  Onset:  since age 12   Modifiers:  Victoza ( lost 40 lbs in 2016), exercise/biking, self created diets , Keto diet  Contributing factors:  eating out of boredom, stress eating, large portions, eats quickly  Associated symptoms: clothes do not fit    Goals: 150 lbs  Highest:  250 + lbs      Hydration: water varies depending on if working but meeting water goals, coffee + stevia + creamer  ETOH: very rarely  Occupation: respiratory therapist 7p-7a  Exercise: denies  Sleep:  8-14 hrs  Smoking: denies    -Has been diabetic since age 35s    Colonoscopy: cologuard 2021    The following portions of the patient's history were reviewed and updated as appropriate: allergies, current medications, past family history, past medical history, past social history, past surgical history, and problem list.    Review of Systems   Constitutional:  Negative for chills and fever. HENT:  Negative for sore throat. Respiratory:  Negative for cough and shortness of breath. Cardiovascular:  Negative for chest pain and palpitations. Gastrointestinal:  Negative for abdominal pain, nausea and vomiting.    Genitourinary:  Negative for dysuria. Musculoskeletal:  Positive for arthralgias (toe, ankle, right hip pain). Skin:  Negative for rash. Neurological:  Negative for dizziness and light-headedness. Psychiatric/Behavioral:  The patient is not nervous/anxious. Reports mood stable       Objective:    /78 (BP Location: Left arm, Patient Position: Sitting, Cuff Size: Large)   Pulse 75   Temp 98 °F (36.7 °C)   Resp 20   Ht 5' 3.75" (1.619 m)   Wt 104 kg (229 lb 3.2 oz)   SpO2 97%   BMI 39.65 kg/m²     Physical Exam  Vitals and nursing note reviewed. Constitutional   General appearance: Abnormal.  well developed and morbidly obese. Eyes No conjunctival pallor. Ears, Nose, Mouth, and Throat Oral mucosa moist.   Pulmonary   Respiratory effort: No increased work of breathing or signs of respiratory distress. Auscultation of lungs: Clear to auscultation, equal breath sounds bilaterally, no wheezes, no rales, no rhonci. Cardiovascular   Auscultation of heart: Normal rate and rhythm, normal S1 and S2, without murmurs. Examination of extremities for edema and/or varicosities: Normal.  no edema. Abdomen   Abdomen: Abnormal.  The abdomen was obese. Bowel sounds were normal. The abdomen was soft and nontender.    Musculoskeletal   Gait and station: Normal.    Psychiatric   Orientation to person, place and time: Normal.    Affect: appropriate

## 2023-12-14 NOTE — ASSESSMENT & PLAN NOTE
-Discussed options of [facility]-Intensive Lifestyle Intervention Program, Very Low Calorie Diet-VLCD, Conservative Program, Martha-En-Y Gastric Bypass, and Vertical Sleeve Gastrectomy and the role of weight loss medications.  -Initial weight loss goal of 5-10% weight loss for improved health  -Screening labs: reviewed CMP, Lipid panel, TSH and HgbA1c  -Patient is interested in pursuing  bariatric surgery  -Will have [facility] office contact patient

## 2023-12-15 ENCOUNTER — TELEPHONE (OUTPATIENT)
Age: 58
End: 2023-12-15

## 2023-12-15 NOTE — TELEPHONE ENCOUNTER
Patients GI provider:  Myles Hodges PA-C    Number to return call: [phone]    Reason for call: Pt calling to ask what is included in Chronic Hep Panel. Pt is trying to utilize services from an online lab, they are not seeing this test on their end and would like to know what is included in this panel. Transferred to [address] [place] [address] at [address] for further assistance.     Scheduled procedure/appointment date if applicable: N/A

## 2023-12-18 ENCOUNTER — TELEPHONE (OUTPATIENT)
Dept: BARIATRICS | Facility: CLINIC | Age: 58
End: 2023-12-18

## 2023-12-18 ENCOUNTER — APPOINTMENT (OUTPATIENT)
Dept: LAB | Facility: CLINIC | Age: 58
End: 2023-12-18
Payer: COMMERCIAL

## 2023-12-18 ENCOUNTER — OFFICE VISIT (OUTPATIENT)
Dept: FAMILY MEDICINE CLINIC | Facility: CLINIC | Age: 58
End: 2023-12-18
Payer: COMMERCIAL

## 2023-12-18 VITALS
SYSTOLIC BLOOD PRESSURE: 126 MMHG | WEIGHT: 227 LBS | HEIGHT: 64 IN | DIASTOLIC BLOOD PRESSURE: 78 MMHG | HEART RATE: 78 BPM | BODY MASS INDEX: 38.76 KG/M2 | TEMPERATURE: 97.3 F | OXYGEN SATURATION: 95 %

## 2023-12-18 DIAGNOSIS — E11.9 TYPE 2 DIABETES MELLITUS WITHOUT COMPLICATION, WITHOUT LONG-TERM CURRENT USE OF INSULIN (HCC): ICD-10-CM

## 2023-12-18 DIAGNOSIS — R53.83 OTHER FATIGUE: ICD-10-CM

## 2023-12-18 DIAGNOSIS — E55.9 VITAMIN D DEFICIENCY: ICD-10-CM

## 2023-12-18 DIAGNOSIS — E78.2 MIXED HYPERLIPIDEMIA: ICD-10-CM

## 2023-12-18 DIAGNOSIS — E03.8 OTHER SPECIFIED HYPOTHYROIDISM: ICD-10-CM

## 2023-12-18 DIAGNOSIS — F41.9 ANXIETY: ICD-10-CM

## 2023-12-18 DIAGNOSIS — K76.0 FATTY LIVER: Primary | ICD-10-CM

## 2023-12-18 DIAGNOSIS — R74.8 ELEVATED LIVER ENZYMES: ICD-10-CM

## 2023-12-18 DIAGNOSIS — K76.0 NAFLD (NONALCOHOLIC FATTY LIVER DISEASE): ICD-10-CM

## 2023-12-18 LAB
25(OH)D3 SERPL-MCNC: 113.7 NG/ML (ref 30–100)
ALBUMIN SERPL BCP-MCNC: 4.1 G/DL (ref 3.5–5)
ALP SERPL-CCNC: 62 U/L (ref 34–104)
ALT SERPL W P-5'-P-CCNC: 75 U/L (ref 7–52)
ANION GAP SERPL CALCULATED.3IONS-SCNC: 12 MMOL/L
AST SERPL W P-5'-P-CCNC: 64 U/L (ref 13–39)
BASOPHILS # BLD AUTO: 0.07 THOUSANDS/ÂΜL (ref 0–0.1)
BASOPHILS NFR BLD AUTO: 1 % (ref 0–1)
BILIRUB SERPL-MCNC: 0.44 MG/DL (ref 0.2–1)
BUN SERPL-MCNC: 15 MG/DL (ref 5–25)
CALCIUM SERPL-MCNC: 9.4 MG/DL (ref 8.4–10.2)
CHLORIDE SERPL-SCNC: 104 MMOL/L (ref 96–108)
CHOLEST SERPL-MCNC: 137 MG/DL
CO2 SERPL-SCNC: 24 MMOL/L (ref 21–32)
CREAT SERPL-MCNC: 0.97 MG/DL (ref 0.6–1.3)
CREAT UR-MCNC: 174.5 MG/DL
EOSINOPHIL # BLD AUTO: 0.4 THOUSAND/ÂΜL (ref 0–0.61)
EOSINOPHIL NFR BLD AUTO: 4 % (ref 0–6)
ERYTHROCYTE [DISTWIDTH] IN BLOOD BY AUTOMATED COUNT: 11.9 % (ref 11.6–15.1)
EST. AVERAGE GLUCOSE BLD GHB EST-MCNC: 200 MG/DL
GFR SERPL CREATININE-BSD FRML MDRD: 64 ML/MIN/1.73SQ M
GLUCOSE P FAST SERPL-MCNC: 153 MG/DL (ref 65–99)
HAV AB SER QL IA: NORMAL
HBA1C MFR BLD: 8.6 %
HBV CORE AB SER QL: NORMAL
HBV CORE IGM SER QL: NORMAL
HBV SURFACE AB SER-ACNC: 151 MIU/ML
HBV SURFACE AG SER QL: NORMAL
HCT VFR BLD AUTO: 39.4 % (ref 34.8–46.1)
HCV AB SER QL: NORMAL
HDLC SERPL-MCNC: 51 MG/DL
HGB BLD-MCNC: 13.6 G/DL (ref 11.5–15.4)
IMM GRANULOCYTES # BLD AUTO: 0.02 THOUSAND/UL (ref 0–0.2)
IMM GRANULOCYTES NFR BLD AUTO: 0 % (ref 0–2)
LDLC SERPL CALC-MCNC: 61 MG/DL (ref 0–100)
LYMPHOCYTES # BLD AUTO: 3.05 THOUSANDS/ÂΜL (ref 0.6–4.47)
LYMPHOCYTES NFR BLD AUTO: 34 % (ref 14–44)
MCH RBC QN AUTO: 31.9 PG (ref 26.8–34.3)
MCHC RBC AUTO-ENTMCNC: 34.5 G/DL (ref 31.4–37.4)
MCV RBC AUTO: 92 FL (ref 82–98)
MICROALBUMIN UR-MCNC: 12.1 MG/L
MICROALBUMIN/CREAT 24H UR: 7 MG/G CREATININE (ref 0–30)
MONOCYTES # BLD AUTO: 0.51 THOUSAND/ÂΜL (ref 0.17–1.22)
MONOCYTES NFR BLD AUTO: 6 % (ref 4–12)
NEUTROPHILS # BLD AUTO: 5.02 THOUSANDS/ÂΜL (ref 1.85–7.62)
NEUTS SEG NFR BLD AUTO: 55 % (ref 43–75)
NONHDLC SERPL-MCNC: 86 MG/DL
NRBC BLD AUTO-RTO: 0 /100 WBCS
PLATELET # BLD AUTO: 269 THOUSANDS/UL (ref 149–390)
PMV BLD AUTO: 11.7 FL (ref 8.9–12.7)
POTASSIUM SERPL-SCNC: 4.1 MMOL/L (ref 3.5–5.3)
PROT SERPL-MCNC: 6.9 G/DL (ref 6.4–8.4)
RBC # BLD AUTO: 4.27 MILLION/UL (ref 3.81–5.12)
SODIUM SERPL-SCNC: 140 MMOL/L (ref 135–147)
TRIGL SERPL-MCNC: 126 MG/DL
TSH SERPL DL<=0.05 MIU/L-ACNC: 7.2 UIU/ML (ref 0.45–4.5)
WBC # BLD AUTO: 9.07 THOUSAND/UL (ref 4.31–10.16)

## 2023-12-18 PROCEDURE — 82104 ALPHA-1-ANTITRYPSIN PHENO: CPT

## 2023-12-18 PROCEDURE — 99214 OFFICE O/P EST MOD 30 MIN: CPT | Performed by: FAMILY MEDICINE

## 2023-12-18 PROCEDURE — 80053 COMPREHEN METABOLIC PANEL: CPT

## 2023-12-18 PROCEDURE — 83036 HEMOGLOBIN GLYCOSYLATED A1C: CPT

## 2023-12-18 PROCEDURE — 86708 HEPATITIS A ANTIBODY: CPT

## 2023-12-18 PROCEDURE — 87340 HEPATITIS B SURFACE AG IA: CPT

## 2023-12-18 PROCEDURE — 85025 COMPLETE CBC W/AUTO DIFF WBC: CPT

## 2023-12-18 PROCEDURE — 84443 ASSAY THYROID STIM HORMONE: CPT

## 2023-12-18 PROCEDURE — 36415 COLL VENOUS BLD VENIPUNCTURE: CPT

## 2023-12-18 PROCEDURE — 80061 LIPID PANEL: CPT

## 2023-12-18 PROCEDURE — 86704 HEP B CORE ANTIBODY TOTAL: CPT

## 2023-12-18 PROCEDURE — 82043 UR ALBUMIN QUANTITATIVE: CPT

## 2023-12-18 PROCEDURE — 86706 HEP B SURFACE ANTIBODY: CPT

## 2023-12-18 PROCEDURE — 86705 HEP B CORE ANTIBODY IGM: CPT

## 2023-12-18 PROCEDURE — 82103 ALPHA-1-ANTITRYPSIN TOTAL: CPT

## 2023-12-18 PROCEDURE — 82306 VITAMIN D 25 HYDROXY: CPT

## 2023-12-18 PROCEDURE — 82570 ASSAY OF URINE CREATININE: CPT

## 2023-12-18 PROCEDURE — 86803 HEPATITIS C AB TEST: CPT

## 2023-12-18 RX ORDER — LEVOTHYROXINE SODIUM 0.03 MG/1
25 TABLET ORAL
Qty: 30 TABLET | Refills: 5 | Status: SHIPPED | OUTPATIENT
Start: 2023-12-18

## 2023-12-18 NOTE — TELEPHONE ENCOUNTER
[facility] Patient Eligibility and Benefit Details for Surgery  *Not a guarantee of payment    Today's Date: 12/18/2023    Insurance CBC  Ins Phone: [phone]    Effective date: 11/1/2021  Term Date: Current    Representative name: Rebeca BRAGG  Reference number for call: [id number]    Does the patient have the benefit written on policy: Yes        If applicable: Are revisions covered under this policy: Yes          If patient is considering paying out of pocket, provide information: Financial Counselor/  [phone]    Having health insurance coverage is not a guarantee that your individual plan covers weight-loss surgery or that you will be approved for surgery.

## 2023-12-19 ENCOUNTER — TELEPHONE (OUTPATIENT)
Dept: FAMILY MEDICINE CLINIC | Facility: CLINIC | Age: 58
End: 2023-12-19

## 2023-12-19 ENCOUNTER — TELEPHONE (OUTPATIENT)
Dept: ENDOCRINOLOGY | Facility: CLINIC | Age: 58
End: 2023-12-19

## 2023-12-19 ENCOUNTER — ANNUAL EXAM (OUTPATIENT)
Dept: OBGYN CLINIC | Facility: CLINIC | Age: 58
End: 2023-12-19
Payer: COMMERCIAL

## 2023-12-19 VITALS
SYSTOLIC BLOOD PRESSURE: 142 MMHG | DIASTOLIC BLOOD PRESSURE: 80 MMHG | WEIGHT: 230 LBS | BODY MASS INDEX: 39.27 KG/M2 | HEIGHT: 64 IN

## 2023-12-19 DIAGNOSIS — Z01.419 ENCOUNTER FOR GYNECOLOGICAL EXAMINATION WITHOUT ABNORMAL FINDING: Primary | ICD-10-CM

## 2023-12-19 DIAGNOSIS — Z12.4 SCREENING FOR MALIGNANT NEOPLASM OF THE CERVIX: ICD-10-CM

## 2023-12-19 PROCEDURE — G0124 SCREEN C/V THIN LAYER BY MD: HCPCS | Performed by: STUDENT IN AN ORGANIZED HEALTH CARE EDUCATION/TRAINING PROGRAM

## 2023-12-19 PROCEDURE — G0145 SCR C/V CYTO,THINLAYER,RESCR: HCPCS | Performed by: STUDENT IN AN ORGANIZED HEALTH CARE EDUCATION/TRAINING PROGRAM

## 2023-12-19 PROCEDURE — G0476 HPV COMBO ASSAY CA SCREEN: HCPCS | Performed by: OBSTETRICS & GYNECOLOGY

## 2023-12-19 PROCEDURE — S0612 ANNUAL GYNECOLOGICAL EXAMINA: HCPCS | Performed by: OBSTETRICS & GYNECOLOGY

## 2023-12-19 NOTE — TELEPHONE ENCOUNTER
----- Message from Eufemia Falk MD sent at 12/18/2023  9:35 PM EST -----  Please  let her know  that  her  A1c is  still 8.6 she  needs  to  see  the  endocrinologist  I have  already placed  the referreal  ----- Message -----  From: Lab, Background User  Sent: 12/18/2023   5:53 PM EST  To: Eufemia Falk MD

## 2023-12-19 NOTE — PROGRESS NOTES
Assessment/Plan:as  below         Problem List Items Addressed This Visit          Digestive    Fatty liver - Primary     In  care  of  GI  had  the  EGD  has appointment  coming up  for  high  liver  enzymes             Endocrine    Type 2 diabetes mellitus without complication, without long-term current use of insulin (HCC)       Lab Results   Component Value Date    HGBA1C 8.6 06/22/2023   Her  A1c  was   high reff to  endocrinologist  her  fasting glucose  is  still in 150es    discussed  diet  exercise  and life  style  modifications   she  is  going  to weight  management  specialist  will increase  the   januvia  to  50 mg  will  f/u         Relevant Orders    Ambulatory Referral to Endocrinology    Ambulatory Referral to Medical Fitness Exercise Specialist    Albumin / creatinine urine ratio    Comprehensive metabolic panel    Hemoglobin A1C    CBC and differential    TSH, 3rd generation with Free T4 reflex    Lipid Panel with Direct LDL reflex    Other specified hypothyroidism     Tsh  is  7.5  started  on  levothyroxine 25 mcg         Relevant Medications    levothyroxine (Levoxyl) 25 mcg tablet    Other Relevant Orders    Ambulatory Referral to Endocrinology       Other    Anxiety     Much  improved    doing  very well         Mixed hyperlipidemia     Discussed  diet  and  exercise  her  labs  reviewed  and  discussed              Subjective:      Patient ID: Jing Ortiz is a 58 y.o. female.    HPI- came in  to  follow up  on  dm2  htn  hyperlipidemia  and  new  hypothyrodism  she  is  in  care  of  weight  management  for  her  weight  and  is  consedering  sleeve  surgery  in  care of  GI   for  high  LFT  ha s appointment lined up no  complaints  today  anxiety  stable  no  depression  at  all  she s ays    The following portions of the patient's history were reviewed and updated as appropriate:   Past Medical History:  She has a past medical history of Anxiety (09/16/2020), Arthritis, Asthma, Bile  duct stricture, Class 2 obesity due to excess calories in adult (02/24/2021), Contusion of ribs, right, initial encounter (09/22/2021), CPAP (continuous positive airway pressure) dependence, Diabetes mellitus (HCC), Fatty liver, Functional urinary incontinence (09/14/2020), Hyperlipidemia, Hypertension, Kidney stone, Moderate persistent asthma with acute exacerbation (09/16/2020), Obesity, Sleep apnea, Trigger ring finger of right hand (12/15/2022), Type 2 diabetes mellitus without complication, without long-term current use of insulin (HCC) (09/16/2020), and Vitamin D deficiency.,  _______________________________________________________________________  Medical Problems:  does not have any pertinent problems on file.,  _______________________________________________________________________  Past Surgical History:   has a past surgical history that includes Tonsillectomy; Colonoscopy (01/01/2015); Hysteroscopy (01/01/2008); Other surgical history (1998); Cyst Removal; Mammo (historical) (01/04/2016); pr tendon sheath incision (Right, 02/06/2023); Upper gastrointestinal endoscopy; and Colonoscopy.,  _______________________________________________________________________  Family History:  family history includes ADD / ADHD in her father; Anxiety disorder in her father; Arthritis in her brother, father, and mother; Bipolar disorder in her brother; Brain cancer in her paternal aunt; Breast cancer (age of onset: 30) in her maternal aunt and maternal aunt; Breast cancer (age of onset: 65) in her maternal aunt; COPD in her father and mother; Cancer (age of onset: 49) in her brother; Depression in her brother, father, and mother; Diabetes in her mother; Drug abuse in her brother; Heart attack in her mother; Heart disease in her father and mother; Hyperlipidemia in her mother; Hypertension in her father and mother; Mental illness in her brother, father, and mother; No Known Problems in her paternal aunt, paternal aunt,  paternal aunt, and paternal aunt; Obesity in her brother, father, and mother; Rheum arthritis in her father and mother; Substance Abuse in her brother; Suicide Attempts in her brother.,  _______________________________________________________________________  Social History:   reports that she has never smoked. She has never used smokeless tobacco. She reports current alcohol use. She reports that she does not use drugs.,  _______________________________________________________________________  Allergies:  is allergic to bee venom, pneumococcal vaccines, and other..  _______________________________________________________________________  Current Outpatient Medications   Medication Sig Dispense Refill    acetaminophen (TYLENOL) 325 mg tablet Take 650 mg by mouth every 6 (six) hours as needed for mild pain      Albuterol (PROVENTIL IN) Inhale      atorvastatin (LIPITOR) 10 mg tablet TAKE 1 TABLET DAILY 30 tablet 0    b complex vitamins tablet Take 1 tablet by mouth daily      BORON PO Take 1 Capful by mouth in the morning      cholecalciferol (VITAMIN D3) 1,000 units tablet Take 10,000 Units by mouth daily      EPINEPHrine (EPIPEN JR) 0.15 mg/0.3 mL SOAJ Inject 0.3 mg into a muscle once      escitalopram (LEXAPRO) 10 mg tablet Take 1 tablet (10 mg total) by mouth daily 90 tablet 3    fluticasone (FLONASE) 50 mcg/act nasal spray 2 sprays into each nostril daily Per nare daily      levalbuterol (Xopenex) 1.25 mg/3 mL nebulizer solution Take 3 mL (1.25 mg total) by nebulization 3 (three) times a day 90 mL 3    levothyroxine (Levoxyl) 25 mcg tablet Take 1 tablet (25 mcg total) by mouth daily in the early morning 30 tablet 5    Loratadine 10 MG CAPS Take by mouth daily after breakfast      metFORMIN (GLUCOPHAGE-XR) 500 mg 24 hr tablet Take 2 tablets (1,000 mg total) by mouth daily with breakfast 180 tablet 3    omeprazole (PriLOSEC) 40 MG capsule Take 1 capsule (40 mg total) by mouth daily before breakfast 90 capsule 1     "oxybutynin (DITROPAN XL) 15 MG 24 hr tablet TAKE 1 TABLET DAILY AT BEDTIME 90 tablet 1    sitaGLIPtin (JANUVIA) 25 mg tablet Take 1 tablet (25 mg total) by mouth daily 30 tablet 5     No current facility-administered medications for this visit.     _______________________________________________________________________  Review of Systems   Constitutional:  Negative for fatigue and fever.   HENT:  Negative for congestion and postnasal drip.    Eyes:  Negative for pain, discharge and itching.   Respiratory:  Negative for cough and shortness of breath.    Cardiovascular:  Negative for chest pain, palpitations and leg swelling.        Htn  hyperlipidemia   Gastrointestinal:  Negative for abdominal distention, abdominal pain and constipation.        High LFT   Endocrine: Negative for cold intolerance, heat intolerance, polydipsia and polyphagia.        Dm2  hypothyrodism   Genitourinary:  Negative for dysuria and flank pain.   Musculoskeletal:  Negative for arthralgias and back pain.   Skin:  Negative for rash.   Neurological:  Negative for dizziness and headaches.   Psychiatric/Behavioral:  Negative for self-injury and sleep disturbance. The patient is not nervous/anxious.          Objective:  Vitals:    12/18/23 1936   BP: 126/78   BP Location: Left arm   Patient Position: Sitting   Cuff Size: Standard   Pulse: 78   Temp: (!) 97.3 °F (36.3 °C)   TempSrc: Temporal   SpO2: 95%   Weight: 103 kg (227 lb)   Height: 5' 3.75\" (1.619 m)     Body mass index is 39.27 kg/m².     Physical Exam  Vitals reviewed.   Constitutional:       General: She is not in acute distress.     Appearance: Normal appearance. She is not ill-appearing, toxic-appearing or diaphoretic.   HENT:      Head: Normocephalic and atraumatic.      Nose: Nose normal. No congestion.      Mouth/Throat:      Mouth: Mucous membranes are moist.   Eyes:      Extraocular Movements: Extraocular movements intact.      Conjunctiva/sclera: Conjunctivae normal.      Pupils: " Pupils are equal, round, and reactive to light.   Cardiovascular:      Rate and Rhythm: Normal rate and regular rhythm.      Pulses: Normal pulses.      Heart sounds: Normal heart sounds. No murmur heard.     No gallop.   Pulmonary:      Effort: Pulmonary effort is normal. No respiratory distress.      Breath sounds: Normal breath sounds. No wheezing or rhonchi.   Abdominal:      General: There is no distension.      Palpations: Abdomen is soft. There is no mass.      Tenderness: There is no abdominal tenderness. There is no guarding.   Musculoskeletal:      Cervical back: Normal range of motion and neck supple. No rigidity or tenderness.      Right lower leg: No edema.      Left lower leg: No edema.   Lymphadenopathy:      Cervical: No cervical adenopathy.   Skin:     Findings: No erythema or rash.   Neurological:      General: No focal deficit present.      Mental Status: She is alert and oriented to person, place, and time.   Psychiatric:         Mood and Affect: Mood normal.         Behavior: Behavior normal.

## 2023-12-19 NOTE — ASSESSMENT & PLAN NOTE
Lab Results   Component Value Date    HGBA1C 8.6 06/22/2023   Her  A1c  was   high reff to  endocrinologist  her  fasting glucose  is  still in 150es    discussed  diet  exercise  and life  style  modifications   she  is  going  to weight  management  specialist  will increase  the   januvia  to  50 mg  will  f/u

## 2023-12-19 NOTE — TELEPHONE ENCOUNTER
Left message for patient on voicemail with result and that she will need to see ENDO. I will mail referral to patient for reference. Advised to call the office with any questions.

## 2023-12-19 NOTE — PROGRESS NOTES
"Kip Ortiz is a 58 y.o. female who presents for annual well woman exam.     NO pmb     History of abnormal Pap smear: yes - HPV POS   Family history of uterine or ovarian cancer: no  Family history of breast cancer: yes - maternal aunt at age 32 twins both     Menstrual History:  OB History               Para        Term   0            AB        Living             SAB        IAB        Ectopic        Multiple        Live Births   0                  No LMP recorded. Patient is postmenopausal.       The following portions of the patient's history were reviewed and updated as appropriate: allergies, current medications, past family history, past medical history, past social history, past surgical history, and problem list.    Review of Systems  Review of Systems   Constitutional:  Positive for fatigue. Negative for activity change, appetite change, chills and fever.   Respiratory:  Negative for apnea, cough, chest tightness and shortness of breath.    Cardiovascular:  Negative for chest pain, palpitations and leg swelling.   Gastrointestinal:  Negative for abdominal pain, constipation, diarrhea, nausea and vomiting.   Genitourinary:  Negative for difficulty urinating, dysuria, flank pain, frequency, hematuria and urgency.   Neurological:  Negative for dizziness, seizures, syncope, light-headedness, numbness and headaches.   Psychiatric/Behavioral:  Negative for agitation and confusion.           Objective      /80 (BP Location: Left arm, Patient Position: Sitting, Cuff Size: Adult)   Ht 5' 3.75\" (1.619 m)   Wt 104 kg (230 lb)   BMI 39.79 kg/m²     Physical Exam  OBGyn Exam     General:   alert and oriented, in no acute distress, alert, appears stated age, and cooperative   Heart: regular rate and rhythm, S1, S2 normal, no murmur, click, rub or gallop   Lungs: clear to auscultation bilaterally   Abdomen: soft, non-tender, without masses or organomegaly   Vulva: normal "   Vagina: normal mucosa, normal discharge   Cervix: no cervical motion tenderness and no lesions   Uterus: normal size   Adnexa:  Breast Exam:  normal adnexa  breasts appear normal, no suspicious masses, no skin or nipple changes or axillary nodes.                Assessment      @well woman@ .   59 yo female  Annual exam   Hpv pos 2022   FH breast cancer declines genetic testing  hypothyroid   OAB failed medical therapy  N/A  IDDM  Plan   Pap/HPV  Diet/exercise  Calcium/vitamin D]  Mammogram up-to-date  Colonoscopy up-to-date  Patient declined genetic testing  Patient is interested in tibial nerve stimulation  Bladder diet reviewed and discussed with patient  Will call patient to schedule appointment for tibial nerve stimulation   All questions answered.     There are no Patient Instructions on file for this visit.

## 2023-12-22 ENCOUNTER — PATIENT MESSAGE (OUTPATIENT)
Dept: FAMILY MEDICINE CLINIC | Facility: CLINIC | Age: 58
End: 2023-12-22

## 2023-12-22 LAB
A1AT PHENOTYP SERPL IFE: NORMAL
A1AT SERPL-MCNC: 129 MG/DL (ref 101–187)

## 2023-12-26 ENCOUNTER — OFFICE VISIT (OUTPATIENT)
Dept: BARIATRICS | Facility: CLINIC | Age: 58
End: 2023-12-26

## 2023-12-26 ENCOUNTER — PATIENT MESSAGE (OUTPATIENT)
Dept: FAMILY MEDICINE CLINIC | Facility: CLINIC | Age: 58
End: 2023-12-26

## 2023-12-26 ENCOUNTER — TELEPHONE (OUTPATIENT)
Dept: OBGYN CLINIC | Facility: CLINIC | Age: 58
End: 2023-12-26

## 2023-12-26 VITALS — HEIGHT: 64 IN | WEIGHT: 230 LBS | BODY MASS INDEX: 39.27 KG/M2

## 2023-12-26 DIAGNOSIS — E78.2 MIXED HYPERLIPIDEMIA: ICD-10-CM

## 2023-12-26 DIAGNOSIS — E66.01 MORBID OBESITY (HCC): Primary | ICD-10-CM

## 2023-12-26 LAB
LAB AP GYN PRIMARY INTERPRETATION: ABNORMAL
Lab: ABNORMAL
PATH INTERP SPEC-IMP: ABNORMAL

## 2023-12-26 PROCEDURE — RECHECK

## 2023-12-26 NOTE — PROGRESS NOTES
Spoke to patient on the phone:    Patient is interested in the bariatric surgical process. Patient qualifies for surgery with current comorbidities and BMI. Discussed the entire surgical workup process and all requirements of [facility] program and patient's insurance. Answered all questions at the time of the phone call. All SW/RD questions redirected to the next appointment, the 2-hour evaluation.      Patient verbalized understanding of the surgical process at [facility] and had no further questions at this time.

## 2023-12-26 NOTE — TELEPHONE ENCOUNTER
Patient was told by DR James someone would call her to make an appt for tibial nerve stimulation for bladder syndrome pain

## 2023-12-26 NOTE — TELEPHONE ENCOUNTER
Patient calling because her pcp only sent 30 day supply in she would like 90 day supply sent to [facility]. She has an appt to fw with her pcp in March.

## 2023-12-26 NOTE — PATIENT COMMUNICATION
Please follow up regarding patient's concern with her medication and [facility] needing more information .

## 2023-12-27 RX ORDER — ATORVASTATIN CALCIUM 10 MG/1
10 TABLET, FILM COATED ORAL DAILY
Qty: 90 TABLET | Refills: 1 | Status: SHIPPED | OUTPATIENT
Start: 2023-12-27

## 2024-01-03 ENCOUNTER — PROCEDURE VISIT (OUTPATIENT)
Dept: OBGYN CLINIC | Facility: CLINIC | Age: 59
End: 2024-01-03
Payer: COMMERCIAL

## 2024-01-03 VITALS
HEIGHT: 64 IN | BODY MASS INDEX: 39.09 KG/M2 | DIASTOLIC BLOOD PRESSURE: 76 MMHG | SYSTOLIC BLOOD PRESSURE: 128 MMHG | WEIGHT: 229 LBS

## 2024-01-03 DIAGNOSIS — R87.612 LGSIL ON PAP SMEAR OF CERVIX: Primary | ICD-10-CM

## 2024-01-03 PROCEDURE — 88305 TISSUE EXAM BY PATHOLOGIST: CPT | Performed by: PATHOLOGY

## 2024-01-03 PROCEDURE — 57454 BX/CURETT OF CERVIX W/SCOPE: CPT | Performed by: OBSTETRICS & GYNECOLOGY

## 2024-01-03 NOTE — PROGRESS NOTES
"Assessment/Plan:     There are no diagnoses linked to this encounter.         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          Subjective:      Patient ID: Jing Ortiz is a 58 y.o. female.    HPI  Patient seen evaluated present to the office today for colposcopy secondary to LGSIL with HPV positive procedure explained and discussed with patient all patient questions answered and patient was satisfied      The following portions of the patient's history were reviewed and updated as appropriate: allergies, current medications, past family history, past medical history, past social history, past surgical history and problem list.    Review of Systems      Objective:      /76 (BP Location: Left arm, Patient Position: Sitting, Cuff Size: Adult)   Ht 5' 3.5\" (1.613 m)   Wt 104 kg (229 lb)   BMI 39.93 kg/m²          Physical Exam       Colposcopy     Date/Time  1/3/2024 2:30 PM     Universal Protocol   Consent: Verbal consent obtained.  Risks and benefits: risks, benefits and alternatives were discussed  Consent given by: patient  Time out: Immediately prior to procedure a \"time out\" was called to verify the correct patient, procedure, equipment, support staff and site/side marked as required.  Patient understanding: patient states understanding of the procedure being performed  Patient consent: the patient's understanding of the procedure matches consent given  Procedure consent: procedure consent matches procedure scheduled  Patient identity confirmed: verbally " with patient     Performed by  Sukumar James MD   Authorized by  Sukumar James MD     Pre-procedure details      Pre-procedure timeout performed: yes      Prepped with: acetic acid     Indication    LSIL   Procedure Details   Procedure: Colposcopy w/ cervical biopsy and ECC      Under satisfactory analgesia the patient was prepped and draped in the dorsal lithotomy position: yes      [place] speculum was placed in the vagina: yes      Under colposcopic examination the transition zone was seen in entirety: yes      Endocervix was curetted using a Kevorkian curette: yes      Cervical biopsy performed with a cervical biopsy punch: yes      Monsel's solution was applied: yes      Biopsy(s): yes      Location:  4/6/8    Specimen to pathology: yes     Post-procedure      Findings: White epithelium      Patient tolerance of procedure:  Tolerated well, no immediate complications   Comments       There was no anlegesia given              No

## 2024-01-04 ENCOUNTER — TELEPHONE (OUTPATIENT)
Dept: OBGYN CLINIC | Facility: CLINIC | Age: 59
End: 2024-01-04

## 2024-01-04 NOTE — TELEPHONE ENCOUNTER
I called the patient explained to her the procedure and went over my procedure note there was no mention about analgesia in the note or about pain management   Finding reviewed and discussed with patient  All patient question answered        ----- Message from Salima Cornejo LPN sent at 1/4/2024  8:20 AM EST -----  Regarding: FW: Visit addendum  Contact: [phone]    ----- Message -----  From: Jing Ortiz  Sent: 1/4/2024   8:07 AM EST  To: #  Subject: Visit addendum                                   I read your summary and am a little confused. You documented satisfactory analgesia and tolerated procedure well. I do not agree with either of these. What analgesia was I given? I had asked about pain management and received nothing. This was excruciatingly painful and despite my ability to tell jokes afterwards I was in tears throughout the procedure. I tend to use humor when hurt. I would like my record addended.

## 2024-01-08 PROCEDURE — 88305 TISSUE EXAM BY PATHOLOGIST: CPT | Performed by: PATHOLOGY

## 2024-01-12 NOTE — PROGRESS NOTES
Bariatric Behavioral Health Evaluation    Presenting Problem:   .Jing Ortiz  is a 58 y.o.   female    :  1965   Patient presented with overall concerns of obesity.  Stated that weight has impacted quality of life and has current future concerns with lack of mobility, movement, chronic pain, and overall health.  Has attempted various weight loss plans in the past including diet plans with yo yo results.  Exercise and medications     Patient is Interested in exploring bariatric surgery.as an option for  weight loss goals.        Is the patient seeking Bariatric Surgery?   YES  Mother is post bariatric surgery. - complications (now )  Knows others who are post surgery with success.  Insurance covers at this time.     Realizes Post- Op Requirements? Yes, and will learn more meeting with the dietitians and social workers through the process     Pre-morbid level of function and history of present illness: 2DM, fatty liver, NICKY w/ CPAP  Has been increasing weight over time.     Stressors and Supports:  peers are supportive      Living situation: Lives with her spouse, but they live in separate spaces.    Work: [employer]   7P - & 7A  Friday, Saturday and  shifts.     Physical Activity:  history of exercise   Some walking on work shift,     Family History (medical, traditions, culture, rules/routines around food):     Mother  mental health illness, Father  mental health illness, and Siblings  history of addiction and mental health illness     Mental Health, Trauma and Substance use Assessment    Psychiatric/Psychological Treatment Diagnosis:   Anxiety with Rx   (denies current unmanaged Anxiety)   History of situational depression   Medication creates a baseline.     History of Eating Disorder:  none noted     Outpatient Counselor - Yes  -  History of therapy in late 's  and  's     Psychiatrist - No     Have you had any Mental Health Higher level of care (ED, PHP or Inpatient )  Treatment? No      Drug and/or Alcohol use and treatment history: none noted    Have you had any Substance Use Treatment? No     Tobacco/Vaping History: none reported  Patient agrees to remain nicotine free post surgery.    Risk Assessment    Identified support system intact.     Risk of harm to self or others:  none noted during evaluation  No HI/SI      Presence of Audio/Visual Hallucinations: Not reported during evaluation     Access to weapons: Not reported during evaluation     Observation: this interview only (SW and RD will follow Jing Ortiz through the bariatric program)     Based on the previous information, the client presents the following risk of harm to self or others:  Low risk        Physical/Mental Health Status:              Appearance: appropriate           Sociability: average           Affect: appropriate           Mood: calm           Thought Process: coherent           Speech: normal           Content: no impairment           Orientation: person  Yes , place  Yes , time  Yes , normal attention span  Yes , normal memory  Yes   and normal judgement  Yes            Insight: emotional  good       BARIATRIC SURGERY EDUCATION CHECKLIST    Patient has received the following education related to the bariatric surgery process and understands:    Patients may be required to complete a psychiatric evaluation and receive clearance for surgery from mental health provider.    Patients who undergo weight loss surgery are at higher risk of increased mental health concerns and suicide attempts.    Patients may be required to complete a full substance abuse evaluation and then complete all treatment recommendations prior to surgery.    If diagnosis of abuse/dependence results, patient may be required to remain sober for one (1) year before having bariatric surgery.    Patients on psychiatric medications should check with their provider to discuss psychiatric medications and the changes in absorption.   Patient should discuss all time release medications with provider and take all medications as prescribed.    The recommendation is that there is no use of any tobacco products, Hookah or vapes for the bariatric post-operation patient.    Bariatric surgery patients should not consume alcohol as a post-operative patient as it may increase risk of numerous health conditions including but not limited to alcohol abuse and ulcers.    There is a possibility of weight regain if patient does not follow all program guidelines and recommendations.    Bariatric surgery patients should exercise thirty (30) to sixty (60) minutes per day to maintain post-surgical weight loss.    Research indicates that bariatric patients are more successful when they see a therapist for up to two (2) years post-op.    Patients will follow all medical and dietary recommendations provided.    Patient will keep all scheduled appointments and follow up with their physician for a minimum of five (5) years.    Patient will take all vitamins as recommended.  Post-operative vitamins are life-long.    There is a goal month set.  All requirements should be met by this time. Don't wait to get started!    There is a deadline month set.  All requirements must be finished by this time and if not, the patient will be halted in the surgery process. The patient can be referred to the medical weight management program or can come back to the surgical program once the unfinished tasks from the previous program are completed.      Female patients of childbearing years are informed that pregnancy is not recommended until 12 - 18 months post-op.      Recommendations: Recommended for surgery  yes    Social Service Note:  Patient presented for behavioral health evaluation for the bariatric program.   Negative for current Mental Health diagnosis.    History of  therapy.   No reported history of Psychiatry.   No reported  Drug and/or Alcohol abuse or treatment.  No current  tobacco use.  Patient educated regarding the impact of nicotine and alcohol on the post surgery bariatric patient. Patient has a Negative  family history of tobacco and alcohol addiction.   Patient has not reported contraindications for bariatric surgery.  Patient will begin making changes with relationship with food through behavior modifications and mindful techniques.  Tracking food intake for one week every three months can assist with weight maintenance and self accountability for post surgery success.   and Dietitian follow up visits are available for pre and post surgery support.  Bariatric support group is available monthly.   Patient verbalized the ability to start making changes and create a healthy relationship around nutrition.   Patient meets criteria for surgery at this time and is referred to the bariatric surgeon.        Jeronimo Pradhan LCSW

## 2024-01-16 ENCOUNTER — OFFICE VISIT (OUTPATIENT)
Dept: BARIATRICS | Facility: CLINIC | Age: 59
End: 2024-01-16

## 2024-01-16 VITALS — HEIGHT: 63 IN | BODY MASS INDEX: 40.68 KG/M2 | WEIGHT: 229.6 LBS

## 2024-01-16 DIAGNOSIS — E66.01 MORBID OBESITY (HCC): Primary | ICD-10-CM

## 2024-01-16 PROCEDURE — RECHECK: Performed by: DIETITIAN, REGISTERED

## 2024-01-16 NOTE — PROGRESS NOTES
"Bariatric Nutrition Assessment Note    Type of surgery    Preop- no months required  Surgery Date: Tentative May-June 2024  Deadline September 2024  Surgeon: Dr. Mirza Pacheco    Nutrition Assessment   Jing E Angel  58 y.o.  female   Wt with BMI of 25: 141.15lbs  Pre-Op Excess Wt: 88.45lbs  Ht 5' 3\" (1.6 m)   Wt 104 kg (229 lb 9.6 oz)   BMI 40.67 kg/m²     NAFLD Fibrosis Score is: .066=Intermediate= Surgeon notified per protocol.    NAFLD Score Correlated Fibrosis Severity   <-1.455 F0-F2   -1.455-0.676 Indeterminate Score   >0.676 F3-F4   **Fibrosis Severity Scale: F0 = no fibrosis; F1= mild fibrosis; F2 = moderate fibrosis; F3 = severe fibrosis; F4 = cirrhosis    NAFLD Score Component Values:  Component Value Date   Age: 58 y.o.     BMI: 40.67 kg/m²    IFG or DM: Yes    AST: 64 U/L 12/18/2023   ALT: 75 U/L 12/18/2023   Platelet: 269 Thousands/uL 12/18/2023   Albumin: 4.1 g/dL 12/18/2023      [place]- [place] Equation:    [id number]  Weight Maintenance=1911kcal/day  Estimated calories for weight loss 911-1411kcal/day ( 1-2# per wk wt loss - sedentary )  Estimated protein needs 64-96g/day (1.0-1.5 gms/kg IBW )   Estimated fluid needs 1920-2240ml/day (30-35 ml/kg IBW )      Weight History   Onset of Obesity: Childhood  Family history of obesity: Yes: mother had WLS in the 1980's, had a lot of complications.  Wt Loss Attempts: Counseling with  MD  Exercise  High Protein/Low CHO diets (Atkins, Dorsey, etc.)  Nutrition Counseling with RD  OTC meds/supplements  Self Created Diets (Portion Control, Healthy Food Choices, etc.)  Patient has tried the above for 6 months or more with insufficient weight loss or weight regain, which is why patient has requested to be evaluated for weight loss surgery today  Maximum Wt Lost: got down to 185lbs with biking    Review of History and Medications   Past Medical History:   Diagnosis Date    Anxiety 09/16/2020    Arthritis     Asthma     Bile duct stricture     Class 2 " obesity due to excess calories in adult 02/24/2021    Contusion of ribs, right, initial encounter 09/22/2021    CPAP (continuous positive airway pressure) dependence     Diabetes mellitus (HCC)     Fatty liver     Functional urinary incontinence 09/14/2020    HPV (human papilloma virus) infection     Hyperlipidemia     Hypertension     Kidney stone     Moderate persistent asthma with acute exacerbation 09/16/2020    Obesity     Sleep apnea     Trigger ring finger of right hand 12/15/2022    Type 2 diabetes mellitus without complication, without long-term current use of insulin (HCC) 09/16/2020    Vitamin D deficiency      Past Surgical History:   Procedure Laterality Date    COLONOSCOPY  01/01/2015    tiny polyp=next 2020    COLONOSCOPY      CYST REMOVAL      thumb cyst    HYSTEROSCOPY  01/01/2008    with biopsy    MAMMO (HISTORICAL)  01/04/2016    [facility]    OTHER SURGICAL HISTORY  1998    tongue papiloma    NM TENDON SHEATH INCISION Right 02/06/2023    Procedure: RELEASE TRIGGER FINGER-right ring finger;  Surgeon: Nalini Conner;  Location:  MAIN OR;  Service: Orthopedics    TONSILLECTOMY      UPPER GASTROINTESTINAL ENDOSCOPY       Social History     Socioeconomic History    Marital status: /Civil Union     Spouse name: Not on file    Number of children: Not on file    Years of education: Not on file    Highest education level: Not on file   Occupational History    Not on file   Tobacco Use    Smoking status: Never    Smokeless tobacco: Never    Tobacco comments:     Secondhand smoke growing up   Vaping Use    Vaping status: Never Used   Substance and Sexual Activity    Alcohol use: Yes     Comment: Seldom    Drug use: Never    Sexual activity: Not Currently     Partners: Male     Birth control/protection: Post-menopausal   Other Topics Concern    Not on file   Social History Narrative    · Do you currently or have you served in the [place] Armed Forces:   No      · Were you activated, into  active duty, as a member of the [employer] or as a Reservist:   No      · Marital status:         · Exercise level:   Moderate      · Diet:   Regular      · General stress level:   Low       · Caffeine intake:   Occasional        · Guns present in home:   No      · Seat belts used routinely:   Yes      · Sunscreen used routinely:   Yes      · Advance directive:   No         Per bianca      Social Determinants of Health     Financial Resource Strain: Not on file   Food Insecurity: Not on file   Transportation Needs: Not on file   Physical Activity: Not on file   Stress: Not on file   Social Connections: Not on file   Intimate Partner Violence: Not on file   Housing Stability: Not on file       Current Outpatient Medications:     acetaminophen (TYLENOL) 325 mg tablet, Take 650 mg by mouth every 6 (six) hours as needed for mild pain, Disp: , Rfl:     Albuterol (PROVENTIL IN), Inhale, Disp: , Rfl:     atorvastatin (LIPITOR) 10 mg tablet, Take 1 tablet (10 mg total) by mouth daily, Disp: 90 tablet, Rfl: 1    b complex vitamins tablet, Take 1 tablet by mouth daily, Disp: , Rfl:     BORON PO, Take 1 Capful by mouth in the morning, Disp: , Rfl:     cholecalciferol (VITAMIN D3) 1,000 units tablet, Take 10,000 Units by mouth daily (Patient not taking: Reported on 12/26/2023), Disp: , Rfl:     EPINEPHrine (EPIPEN JR) 0.15 mg/0.3 mL SOAJ, Inject 0.3 mg into a muscle once, Disp: , Rfl:     escitalopram (LEXAPRO) 10 mg tablet, Take 1 tablet (10 mg total) by mouth daily, Disp: 90 tablet, Rfl: 3    fluticasone (FLONASE) 50 mcg/act nasal spray, 2 sprays into each nostril daily Per nare daily, Disp: , Rfl:     levalbuterol (Xopenex) 1.25 mg/3 mL nebulizer solution, Take 3 mL (1.25 mg total) by nebulization 3 (three) times a day, Disp: 90 mL, Rfl: 3    levothyroxine (Levoxyl) 25 mcg tablet, Take 1 tablet (25 mcg total) by mouth daily in the early morning, Disp: 30 tablet, Rfl: 5    Loratadine 10 MG CAPS, Take by mouth  daily after breakfast, Disp: , Rfl:     metFORMIN (GLUCOPHAGE-XR) 500 mg 24 hr tablet, Take 2 tablets (1,000 mg total) by mouth daily with breakfast, Disp: 180 tablet, Rfl: 3    omeprazole (PriLOSEC) 40 MG capsule, Take 1 capsule (40 mg total) by mouth daily before breakfast, Disp: 90 capsule, Rfl: 1    oxybutynin (DITROPAN XL) 15 MG 24 hr tablet, TAKE 1 TABLET DAILY AT BEDTIME, Disp: 90 tablet, Rfl: 1    sitaGLIPtin (JANUVIA) 25 mg tablet, Take 1 tablet (25 mg total) by mouth daily, Disp: 30 tablet, Rfl: 5    Food Intake and Lifestyle Assessment   Food Intake Assessment completed via usual diet recall  Works 7pm to 7am  Wakes 4-4:30pm  Breakfast: egg sandwich or eggs and ham/cheese, bowl of grits.  Or makes a shake with berries, banana, premier protein shake- on weekends  Thursdays cooks, eats leftovers on weekends- makes dinner for 3 nights  Snack: belvita biscuits or sweet n salty almond granola bar   Snack: at work: cookies, biscuits, doughnuts, candy/chocolate, cakes: one to two snacks during shift  Dinner: 1:00-1:30am: pasta dish with sun-dried tomato and artichoke  Beverage intake: water and coffee/tea  Protein supplement: Premier Protein  Has box of [employer] meal delivery, shakes, bars, etc  Estimated protein intake per day: 60-90g  Estimated fluid intake per day: 100oz water: 50oz mug x2.  Coffee only at work-60oz one day a week  Meals eaten away from home: maybe 1 per week: usually chik-jeison-A sandwich  Typical meal pattern: 2 meals per day and 3 snacks per day  Eating Behaviors: Consumption of high calorie/ high fat foods, Consumption of high calorie beverages, Large portion sizes, Frequent snacking/ grazing, and Mindless eating  Food allergies or intolerances:   Allergies   Allergen Reactions    Bee Venom Anaphylaxis, Hives, Shortness Of Breath, Throat Swelling and Wheezing    Pneumococcal Vaccines Hives     Anaphylaxis as per pt    Other Rash and Sneezing     Beech, birch (rash), maple tree pollen. Dog  "and cat danger.      Cultural or Hindu considerations: none noted    Physical Assessment  Physical Activity  Types of exercise: None  Likes biking  Current physical limitations: knee pain    Psychosocial Assessment   Support systems: spouse friend(s) relative(s) coworkers   for \"business relationship\".  He lives in basement.  They live separate lives.  Has adult adopted daughter from [place]- she moved out last May and they no longer speak.  Socioeconomic factors: works as respiratory therapist  Likes silver, has a craft room  Doesn't watch TV  Likes to cook  4 garden beds in front yard    Nutrition Diagnosis  Diagnosis: Overweight / Obesity (NC-3.3) and Excessive energy intake (NI-1.5)  Related to: Physical inactivity and Excessive energy intake  As Evidenced by: BMI >25, Excessive energy intake, and Unintentional weight gain     Nutrition Prescription: Recommend the following diet  Low fat, Low sugar, High protein, and Regular    Interventions and Teaching   Discussed pre-op and post-op nutrition guidelines.       Patient educated and handouts provided.  Surgical changes to stomach / GI  Capacity of post-surgery stomach  Diet progression  Adequate hydration  Sugar and fat restriction to decrease \"dumping syndrome\"  Fat restriction to decrease steatorrhea  Exercise  Suggestions for pre-op diet  Nutrition considerations after surgery  Protein supplements  Meal planning and preparation  Appropriate carbohydrate, protein, and fat intake, and food/fluid choices to maximize safe weight loss, nutrient intake, and tolerance   Dietary and lifestyle changes  Possible problems with poor eating habits  Techniques for self monitoring and keeping daily food journal  Potential for food intolerance after surgery, and ways to deal with them including: lactose intolerance, nausea, reflux, vomiting, diarrhea, food intolerance, appetite changes, gas  Vitamin / Mineral supplementation of Multivitamin with minerals and " Vitamin D    Pt currently takes:  Vitamin D3 10,000IU once a week  Vitamin B12  Has been using cpap since 1997- loves it    Patient is not currently pregnant and doesn't desire to become pregnant a minimum of one year post-op    Education provided to: patient    Barriers to learning: No barriers identified    Readiness to change: preparation    Prior research on procedure: internet, discussed with provider, and friends or family    Comprehension: verbalizes understanding     Expected Compliance: good    Recommendations  Pt is an appropriate candidate for surgery. Yes    Minimum BMI of 43=972.6lbs    NAFLD Fibrosis Score is: -.004=Intermediate= Surgeon notified per protocol.    Labs done 12/18/23: CBC, CMP, TSH, Lipid panel, HgbA1c  CvzD3y=2.6%  TSH=7.195.  Pt aware needs to be under 5.0.  Pt started on meds last month, has endocrinologist appt 2/12/24.  Re-check in 6-8 weeks.    Evaluation / Monitoring  Dietitian to Monitor: Eating pattern as discussed Tolerance of nutrition prescription Body weight Lab values Physical activity Bowel pattern    Goals  Eliminate sugar sweetened beverages, Food journal, Exercise 30 minutes 5 times per week, Complete lession plans 1-6, Eat 3 meals per day, and Eliminate mindless snacking    Time Spent:   1 Hour

## 2024-01-24 ENCOUNTER — TELEPHONE (OUTPATIENT)
Dept: BARIATRICS | Facility: CLINIC | Age: 59
End: 2024-01-24

## 2024-01-24 NOTE — TELEPHONE ENCOUNTER
Returned call to patient to reschedule cancelled 1/25 appointment with Dr Pacheco, no answer, left message to call back.

## 2024-01-24 NOTE — TELEPHONE ENCOUNTER
Received call from Pt. Pt explained she is no longer interested in bariatric surgery. Explained to Pt she will be halted and if she chooses to rejoin the program at a later date she will need to restart the process from the beginning. Pt verbalized understanding. Remaining appts canceled and pre-cert specialist informed.

## 2024-01-30 DIAGNOSIS — E11.65 TYPE 2 DIABETES MELLITUS WITH HYPERGLYCEMIA, WITHOUT LONG-TERM CURRENT USE OF INSULIN (HCC): Primary | ICD-10-CM

## 2024-01-30 RX ORDER — ALOGLIPTIN 12.5 MG/1
12.5 TABLET, FILM COATED ORAL DAILY
Qty: 90 TABLET | Refills: 0 | Status: SHIPPED | OUTPATIENT
Start: 2024-01-30 | End: 2024-01-31

## 2024-01-31 DIAGNOSIS — E11.69 TYPE 2 DIABETES MELLITUS WITH OTHER SPECIFIED COMPLICATION, WITHOUT LONG-TERM CURRENT USE OF INSULIN (HCC): Primary | ICD-10-CM

## 2024-02-12 ENCOUNTER — CONSULT (OUTPATIENT)
Dept: ENDOCRINOLOGY | Facility: CLINIC | Age: 59
End: 2024-02-12
Payer: COMMERCIAL

## 2024-02-12 VITALS
DIASTOLIC BLOOD PRESSURE: 68 MMHG | HEIGHT: 63 IN | TEMPERATURE: 98.6 F | WEIGHT: 229 LBS | RESPIRATION RATE: 18 BRPM | OXYGEN SATURATION: 99 % | BODY MASS INDEX: 40.57 KG/M2 | HEART RATE: 68 BPM | SYSTOLIC BLOOD PRESSURE: 110 MMHG

## 2024-02-12 DIAGNOSIS — E03.8 OTHER SPECIFIED HYPOTHYROIDISM: ICD-10-CM

## 2024-02-12 DIAGNOSIS — E03.8 SUBCLINICAL HYPOTHYROIDISM: ICD-10-CM

## 2024-02-12 DIAGNOSIS — E11.9 TYPE 2 DIABETES MELLITUS WITHOUT COMPLICATION, WITHOUT LONG-TERM CURRENT USE OF INSULIN (HCC): Primary | ICD-10-CM

## 2024-02-12 DIAGNOSIS — E11.69 TYPE 2 DIABETES MELLITUS WITH OTHER SPECIFIED COMPLICATION, WITHOUT LONG-TERM CURRENT USE OF INSULIN (HCC): ICD-10-CM

## 2024-02-12 PROBLEM — E66.813 CLASS 3 SEVERE OBESITY DUE TO EXCESS CALORIES WITH SERIOUS COMORBIDITY AND BODY MASS INDEX (BMI) OF 40.0 TO 44.9 IN ADULT (HCC): Status: ACTIVE | Noted: 2021-02-24

## 2024-02-12 PROCEDURE — 99244 OFF/OP CNSLTJ NEW/EST MOD 40: CPT | Performed by: STUDENT IN AN ORGANIZED HEALTH CARE EDUCATION/TRAINING PROGRAM

## 2024-02-12 NOTE — PROGRESS NOTES
New Patient Progress Note      Cc: diabetes    Referring Provider  Eufemia Falk Md  [address] [place] [address]     History of Present Illness:   Jing Ortiz is a 58 y.o. female with a history of type 2 diabetes without long term use of insulin who is referred by PCP to establish care with us.    She is currently on januvia 25 mg daily and Metformin XR 1000 mg daily.  Previously was on Victoza which was cost prohibitive.     She does check her blood sugars once daily and blood sugar are ranging 90 - 110.  She has started life style modification and she has lost weight.      Opthamology: [facility]  Podiatry: no      Has hypertension: followed by PCP; not on ACE inhibitor/ARB, }  Has hyperlipidemia: followed by PCP; on Lipitor 10 mg daily- tolerating well, no myalgias.    Thyroid disorders: subclinical hypothyroidism on levothyroxine 25 mcg once daily.    History of pancreatitis: no, no family or personal history MTC or MEN.    Patient Active Problem List   Diagnosis    Functional urinary incontinence    Type 2 diabetes mellitus without complication, without long-term current use of insulin (HCC)    Moderate persistent asthma with acute exacerbation    Anxiety    Morbid obesity (HCC)    Chronic bronchitis, unspecified chronic bronchitis type (HCC)    NICKY on CPAP    Fatty liver    Mixed hyperlipidemia    Acute bronchitis    Other specified hypothyroidism    Type 2 diabetes mellitus with other specified complication, without long-term current use of insulin (HCC)      Past Medical History:   Diagnosis Date    Anxiety 09/16/2020    Arthritis     Asthma     Bile duct stricture     Class 2 obesity due to excess calories in adult 02/24/2021    Contusion of ribs, right, initial encounter 09/22/2021    CPAP (continuous positive airway pressure) dependence     Diabetes mellitus (HCC)     Fatty liver     Functional urinary incontinence 09/14/2020    HPV (human papilloma virus) infection     Hyperlipidemia      Hypertension     Kidney stone     Moderate persistent asthma with acute exacerbation 2020    Obesity     Sleep apnea     Trigger ring finger of right hand 12/15/2022    Type 2 diabetes mellitus without complication, without long-term current use of insulin ([facility]) 2020    Vitamin D deficiency       Past Surgical History:   Procedure Laterality Date    COLONOSCOPY  2015    tiny polyp=next     COLONOSCOPY      CYST REMOVAL      thumb cyst    HYSTEROSCOPY  2008    with biopsy    MAMMO (HISTORICAL)  2016    normal=[facility]    OTHER SURGICAL HISTORY      tongue papiloma    LA TENDON SHEATH INCISION Right 2023    Procedure: RELEASE TRIGGER FINGER-right ring finger;  Surgeon: Nalini Conner;  Location:  MAIN OR;  Service: Orthopedics    TONSILLECTOMY      UPPER GASTROINTESTINAL ENDOSCOPY        Family History   Problem Relation Age of Onset    Obesity Mother         hx gastric bypass    Diabetes Mother     Hypertension Mother     Depression Mother          age 62    Arthritis Mother     Hyperlipidemia Mother     Heart attack Mother     Rheum arthritis Mother     COPD Mother     Heart disease Mother     Mental illness Mother     Obesity Father     Hypertension Father     Depression Father          age 82    Arthritis Father     Rheum arthritis Father     ADD / ADHD Father     Anxiety disorder Father     COPD Father     Heart disease Father     Mental illness Father     Obesity Brother     Depression Brother          age 52    Arthritis Brother     Cancer Brother 49        malignant neoplasm of lip, oral cavity, and pharynx    Drug abuse Brother     Substance Abuse Brother     Bipolar disorder Brother     Suicide Attempts Brother     Mental illness Brother     Breast cancer Maternal Aunt 30    Breast cancer Maternal Aunt 30    Breast cancer Maternal Aunt 65    Brain cancer Paternal Aunt     No Known Problems Paternal Aunt     No Known Problems  Paternal Aunt     No Known Problems Paternal Aunt     No Known Problems Paternal Aunt      Social History     Tobacco Use    Smoking status: Never    Smokeless tobacco: Never    Tobacco comments:     Secondhand smoke growing up   Substance Use Topics    Alcohol use: Yes     Comment: Seldom     Allergies   Allergen Reactions    Bee Venom Anaphylaxis, Hives, Shortness Of Breath, Throat Swelling and Wheezing    Pneumococcal Vaccines Hives     Anaphylaxis as per pt    Other Rash and Sneezing     Beech, birch (rash), maple tree pollen. Dog and cat danger.          Current Outpatient Medications:     acetaminophen (TYLENOL) 325 mg tablet, Take 650 mg by mouth every 6 (six) hours as needed for mild pain, Disp: , Rfl:     Albuterol (PROVENTIL IN), Inhale, Disp: , Rfl:     atorvastatin (LIPITOR) 10 mg tablet, Take 1 tablet (10 mg total) by mouth daily, Disp: 90 tablet, Rfl: 1    b complex vitamins tablet, Take 1 tablet by mouth daily, Disp: , Rfl:     BORON PO, Take 1 Capful by mouth in the morning, Disp: , Rfl:     cholecalciferol (VITAMIN D3) 1,000 units tablet, Take 10,000 Units by mouth daily (Patient not taking: Reported on 12/26/2023), Disp: , Rfl:     EPINEPHrine (EPIPEN JR) 0.15 mg/0.3 mL SOAJ, Inject 0.3 mg into a muscle once, Disp: , Rfl:     escitalopram (LEXAPRO) 10 mg tablet, Take 1 tablet (10 mg total) by mouth daily, Disp: 90 tablet, Rfl: 3    fluticasone (FLONASE) 50 mcg/act nasal spray, 2 sprays into each nostril daily Per nare daily, Disp: , Rfl:     levalbuterol (Xopenex) 1.25 mg/3 mL nebulizer solution, Take 3 mL (1.25 mg total) by nebulization 3 (three) times a day, Disp: 90 mL, Rfl: 3    levothyroxine (Levoxyl) 25 mcg tablet, Take 1 tablet (25 mcg total) by mouth daily in the early morning, Disp: 30 tablet, Rfl: 5    Loratadine 10 MG CAPS, Take by mouth daily after breakfast, Disp: , Rfl:     metFORMIN (GLUCOPHAGE-XR) 500 mg 24 hr tablet, Take 2 tablets (1,000 mg total) by mouth daily with breakfast,  "Disp: 180 tablet, Rfl: 3    omeprazole (PriLOSEC) 40 MG capsule, Take 1 capsule (40 mg total) by mouth daily before breakfast, Disp: 90 capsule, Rfl: 1    oxybutynin (DITROPAN XL) 15 MG 24 hr tablet, TAKE 1 TABLET DAILY AT BEDTIME, Disp: 90 tablet, Rfl: 1    sitaGLIPtin (JANUVIA) 25 mg tablet, Take 1 tablet (25 mg total) by mouth daily, Disp: 30 tablet, Rfl: 5  Review of Systems   Constitutional:  Negative for appetite change, fatigue and unexpected weight change.   HENT:  Negative for trouble swallowing and voice change.    Eyes:  Negative for visual disturbance.   Respiratory:  Negative for cough, shortness of breath and wheezing.    Cardiovascular:  Negative for palpitations and leg swelling.   Gastrointestinal:  Negative for abdominal pain, constipation, diarrhea, nausea and vomiting.   Endocrine: Negative for cold intolerance, heat intolerance, polyphagia and polyuria.   Musculoskeletal:  Negative for arthralgias.   Skin:  Negative for color change, rash and wound.   Neurological:  Negative for dizziness, tremors, weakness, light-headedness, numbness and headaches.   Psychiatric/Behavioral:  Negative for agitation and sleep disturbance. The patient is not nervous/anxious.        Physical Exam:  There is no height or weight on file to calculate BMI.  There were no vitals taken for this visit.   Wt Readings from Last 3 Encounters:   01/16/24 104 kg (229 lb 9.6 oz)   01/03/24 104 kg (229 lb)   12/26/23 104 kg (230 lb)       GEN: NAD  E/n/m nl facies,   Eyes: no stare or proptosis,   Neck: trachea midline, thyroid NT to palpation, nl in size,  CV; heart reg rate s1s2 nl,   Resp: CTAB, good effort  Ab+BS  Neuro: no tremor,   Skin: warm and dry,  Ext: no edema bilaterally,   Psych: nl mood and affect, no gross lapses in memory      Labs:   No components found for: \"HA1C\"  No components found for: \"GLU\"    Lab Results   Component Value Date    CREATININE 0.97 12/18/2023    CREATININE 0.88 12/15/2022    CREATININE 0.95 " "06/13/2022    BUN 15 12/18/2023    K 4.1 12/18/2023     12/18/2023    CO2 24 12/18/2023     eGFR   Date Value Ref Range Status   12/18/2023 64 ml/min/1.73sq m Final     No components found for: \"MALBCRER\"    Lab Results   Component Value Date    HDL 51 12/18/2023    TRIG 126 12/18/2023       Lab Results   Component Value Date    ALT 75 (H) 12/18/2023    AST 64 (H) 12/18/2023    ALKPHOS 62 12/18/2023       No results found for: \"TSH\", \"FREET4\", \"TSI\"    Impression:  1. Type 2 diabetes mellitus without complication, without long-term current use of insulin ([facility])    2. Other specified hypothyroidism    3. Type 2 diabetes mellitus with other specified complication, without long-term current use of insulin ([facility])    4. Subclinical hypothyroidism           Plan:    Problem List Items Addressed This Visit          Endocrine    Type 2 diabetes mellitus without complication, without long-term current use of insulin ([facility]) - Primary    Relevant Medications    semaglutide, 0.25 or 0.5 mg/dose, (Ozempic, 0.25 or 0.5 MG/DOSE,) 2 mg/3 mL injection pen    Other specified hypothyroidism     Continue levothyroxine at current dose, check TSH and free T4 as well as thyroid antibody panel before next visit.         Type 2 diabetes mellitus with other specified complication, without long-term current use of insulin ([facility])       Lab Results   Component Value Date    HGBA1C 8.6 (H) 12/18/2023   Suboptimally controlled with A1c of 8.6%.  We discussed pathophysiology of type 2 diabetes and importance of glycemic control to avoid complications,  counseled on adverse side effects of GLP-1 agonist including nausea, vomiting, pancreatitis, medullary thyroid cancer,. No FHx of MEN2.   Ozempic 0.25 for 4 weeks and then 0.5 mg weekly is started.  Discontinue Januvia.  Continue metformin at current dose.  She was instructed to check her blood sugar once a day and bring her sugar log to next visit.  Return back in 3 months.  Labs prior to next " visit.         Relevant Medications    semaglutide, 0.25 or 0.5 mg/dose, (Ozempic, 0.25 or 0.5 MG/DOSE,) 2 mg/3 mL injection pen     Other Visit Diagnoses       Subclinical hypothyroidism        Relevant Orders    T4, free    TSH, 3rd generation    Thyroid Antibodies Panel              Discussed with the patient and all questioned fully answered. She will call me if any problems arise.    Counseled patient on diagnostic results, prognosis, risk and benefit of treatment options, instruction for management, importance of treatment compliance, Risk  factor reduction and impressions      Jeff Henderson MD

## 2024-02-12 NOTE — ASSESSMENT & PLAN NOTE
Lab Results   Component Value Date    HGBA1C 8.6 (H) 12/18/2023   Suboptimally controlled with A1c of 8.6%.  We discussed pathophysiology of type 2 diabetes and importance of glycemic control to avoid complications,  counseled on adverse side effects of GLP-1 agonist including nausea, vomiting, pancreatitis, medullary thyroid cancer,. No FHx of MEN2.   Ozempic 0.25 for 4 weeks and then 0.5 mg weekly is started.  Discontinue Januvia.  Continue metformin at current dose.  She was instructed to check her blood sugar once a day and bring her sugar log to next visit.  Return back in 3 months.  Labs prior to next visit.

## 2024-02-12 NOTE — ASSESSMENT & PLAN NOTE
Continue levothyroxine at current dose, check TSH and free T4 as well as thyroid antibody panel before next visit.

## 2024-02-12 NOTE — PATIENT INSTRUCTIONS
I started Ozempic 0.25 once weekly for 4 weeks and then will increase to 0.5 mg once weekly, please call the office if you develop any side effects.   Discontinue Januvia

## 2024-02-12 NOTE — ASSESSMENT & PLAN NOTE
Emphasized importance of daily exercise, weight loss, caloric reduction, small meals, consumption of multiple servings of fruits and vegetables and avoidance of concentrated sweets such as juice and soda.   I restarted Ozempic 0.25 mg weekly for 4 weeks and then 0.5 mg weekly.

## 2024-02-19 ENCOUNTER — OFFICE VISIT (OUTPATIENT)
Dept: GASTROENTEROLOGY | Facility: CLINIC | Age: 59
End: 2024-02-19
Payer: COMMERCIAL

## 2024-02-19 VITALS
OXYGEN SATURATION: 94 % | WEIGHT: 216 LBS | DIASTOLIC BLOOD PRESSURE: 88 MMHG | HEIGHT: 63 IN | TEMPERATURE: 97.2 F | SYSTOLIC BLOOD PRESSURE: 140 MMHG | HEART RATE: 74 BPM | BODY MASS INDEX: 38.27 KG/M2

## 2024-02-19 DIAGNOSIS — R74.8 ELEVATED LIVER ENZYMES: ICD-10-CM

## 2024-02-19 DIAGNOSIS — R11.2 NAUSEA AND VOMITING, UNSPECIFIED VOMITING TYPE: ICD-10-CM

## 2024-02-19 DIAGNOSIS — K76.0 NAFLD (NONALCOHOLIC FATTY LIVER DISEASE): Primary | ICD-10-CM

## 2024-02-19 DIAGNOSIS — R10.13 EPIGASTRIC ABDOMINAL PAIN: ICD-10-CM

## 2024-02-19 PROCEDURE — 99213 OFFICE O/P EST LOW 20 MIN: CPT | Performed by: FAMILY MEDICINE

## 2024-02-19 NOTE — PROGRESS NOTES
[facility] - Outpatient Follow-up Note  Jing Ortiz 58 y.o. female MRN: [id number]  Encounter: [id number]          ASSESSMENT AND PLAN:      1. NAFLD (nonalcoholic fatty liver disease)  2. Elevated liver enzymes  Patient with mildly elevated transaminases since December 2022 incidentally noted to have hepatomegaly and hepatic steatosis on CT chest. 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 She also completed an abdominal ultrasound which redemonstrated hepatic steatosis in addition to hepatosplenomegaly. US elastography was notable for F0-F1 fibrosis and S3 steatosis.    Fortunately, patient has lost approximately 20 pounds since her last appointment with the use of [employer] diet kits. She is also following with endocrinology for management of her diabetes and was started on Ozempic. Congratulated and encouraged her to continue with her efforts towards weight loss! She will also continue with efforts in order to optimize her metabolic risk factors and limit her alcohol consumption.    It is recommended that she continue to have her liver function tests monitored q6-12 months with routine labs and will plan for repeat US elastography q2-3 years, or as clinically indicated, to monitor for the development of hepatic fibrosis.    - Hepatic function panel; Future    3. Epigastric abdominal pain (resolved)  4. Nausea and vomiting, unspecified vomiting type (resolved)  Patient previously with intermittent epigastric abdominal pain and occasional nausea with vomiting suspected to be secondary to silent GERD vs biliary dyskinesia. She has since undergone an EGD (11/2023) which was notable for mild edematous and erythematous mucosa in the body of the stomach, incisura and antrum. Biopsies were negative for H. pylori. She was prescribed omeprazole 40 mg once daily  x30 days with complete resolution of her symptoms. Also discussed dhow weight loss has likely contributed to this. If her symptoms would return then would recommend she resume a daily PPI.     Follow-up in 6 months or sooner if necessary.     ______________________________________________________________________    SUBJECTIVE: Patient is a 58 y.o. female with PMH significant for anxiety, asthma, DM2, NICKY on CPAP and HLD who presents today for follow-up regarding NAFLD and to discuss her recent EGD.     Interval history  - She was seen by weight management for consideration of bariatric surgery but has been able to lose approx 20 lbs with the use of [employer] diet kits.   - Following with endocrinology and was started on Ozempic.  - EGD (11/2023) notable for mild edematous and erythematous mucosa in the body of the stomach, incisura and antrum. Took omeprazole x1 month with resolution of her epigastric abdominal pain.    Extended liver history  Patient was noted to have abnormal liver function tests including a mildly elevated serum ALT on routine serologies in December 2022. Per chart review, she is noted to have a prior CT chest which did visualize her liver notable for hepatomegaly and hepatic steatosis. Serologies without thrombocytopenia or hypoalbuminemia.     In regards to metabolic risk factors, patient's BMI is 39.79 with DM2 and hyperlipidemia. Admits to rare alcohol use, 1-2 hard ciders monthly.      Denies personal history of chronic liver disease. Denies a family history of liver disease or liver-related cancer. Denies a known past or current infection with viral hepatitis. Denies being treated for any autoimmune conditions. Denies taking any new medications, herbal supplements or performance-enhancing drugs not reflected on her med list. Started taking Lipitor and Januvia 2 weeks prior but her abnormal liver function tests were first seen in December 2022. Denies excessive Tylenol use.      Denies any  liver specific complaints but does report epigastric abdominal pain 2-3 times weekly. States this typically occurs 4 to 5 hours after eating and will occasionally resolve on her own or resulted in her throwing up.  Denies this being related with overt heartburn. She does report a diagnosis of biliary dyskinesia for which she has not had her gallbladder removed. Denies dysphagia/odynophagia, early satiety, other abdominal pain, overt GI bleeding or unintentional weight loss.      REVIEW OF SYSTEMS IS OTHERWISE NEGATIVE.      Historical Information   Past Medical History:   Diagnosis Date   • Anxiety 09/16/2020   • Arthritis    • Asthma    • Bile duct stricture    • Class 2 obesity due to excess calories in adult 02/24/2021   • Contusion of ribs, right, initial encounter 09/22/2021   • CPAP (continuous positive airway pressure) dependence    • Diabetes mellitus (HCC)    • Fatty liver    • Functional urinary incontinence 09/14/2020   • HPV (human papilloma virus) infection    • Hyperlipidemia    • Hypertension    • Kidney stone    • Moderate persistent asthma with acute exacerbation 09/16/2020   • Obesity    • Sleep apnea    • Trigger ring finger of right hand 12/15/2022   • Type 2 diabetes mellitus without complication, without long-term current use of insulin (HCC) 09/16/2020   • Vitamin D deficiency      Past Surgical History:   Procedure Laterality Date   • COLONOSCOPY  01/01/2015    tiny polyp=next 2020   • COLONOSCOPY     • CYST REMOVAL      thumb cyst   • HYSTEROSCOPY  01/01/2008    with biopsy   • MAMMO (HISTORICAL)  01/04/2016    [place] [place] [facility] [place]   • OTHER SURGICAL HISTORY  1998    tongue papiloma   • MT TENDON SHEATH INCISION Right 02/06/2023    Procedure: RELEASE TRIGGER FINGER-right ring finger;  Surgeon: Nalini Conner;  Location:  MAIN OR;  Service: Orthopedics   • TONSILLECTOMY     • UPPER GASTROINTESTINAL ENDOSCOPY       Social History   Social History     Substance and Sexual Activity    Alcohol Use Yes    Comment: Seldom     Social History     Substance and Sexual Activity   Drug Use Never     Social History     Tobacco Use   Smoking Status Never   Smokeless Tobacco Never   Tobacco Comments    Secondhand smoke growing up     Family History   Problem Relation Age of Onset   • Obesity Mother         hx gastric bypass   • Diabetes Mother    • Hypertension Mother    • Depression Mother          age 62   • Arthritis Mother    • Hyperlipidemia Mother    • Heart attack Mother    • Rheum arthritis Mother    • COPD Mother    • Heart disease Mother    • Mental illness Mother    • Obesity Father    • Hypertension Father    • Depression Father          age 82   • Arthritis Father    • Rheum arthritis Father    • ADD / ADHD Father    • Anxiety disorder Father    • COPD Father    • Heart disease Father    • Mental illness Father    • Obesity Brother    • Depression Brother          age 52   • Arthritis Brother    • Cancer Brother 49        malignant neoplasm of lip, oral cavity, and pharynx   • Drug abuse Brother    • Substance Abuse Brother    • Bipolar disorder Brother    • Suicide Attempts Brother    • Mental illness Brother    • Breast cancer Maternal Aunt 30   • Breast cancer Maternal Aunt 30   • Breast cancer Maternal Aunt 65   • Brain cancer Paternal Aunt    • No Known Problems Paternal Aunt    • No Known Problems Paternal Aunt    • No Known Problems Paternal Aunt    • No Known Problems Paternal Aunt        Meds/Allergies       Current Outpatient Medications:   •  acetaminophen (TYLENOL) 325 mg tablet  •  Albuterol (PROVENTIL IN)  •  atorvastatin (LIPITOR) 10 mg tablet  •  b complex vitamins tablet  •  BORON PO  •  EPINEPHrine (EPIPEN JR) 0.15 mg/0.3 mL SOAJ  •  fluticasone (FLONASE) 50 mcg/act nasal spray  •  levothyroxine (Levoxyl) 25 mcg tablet  •  Loratadine 10 MG CAPS  •  metFORMIN (GLUCOPHAGE-XR) 500 mg 24 hr tablet  •  oxybutynin (DITROPAN XL) 15 MG 24 hr tablet  •   "semaglutide, 0.25 or 0.5 mg/dose, (Ozempic, 0.25 or 0.5 MG/DOSE,) 2 mg/3 mL injection pen  •  cholecalciferol (VITAMIN D3) 1,000 units tablet  •  escitalopram (LEXAPRO) 10 mg tablet  •  levalbuterol (Xopenex) 1.25 mg/3 mL nebulizer solution  •  omeprazole (PriLOSEC) 40 MG capsule    Allergies   Allergen Reactions   • Bee Venom Anaphylaxis, Hives, Shortness Of Breath, Throat Swelling and Wheezing   • Pneumococcal Vaccines Hives     Anaphylaxis as per pt   • Other Rash and Sneezing     Beech, birch (rash), maple tree pollen. Dog and cat danger.            Objective     Blood pressure 140/88, pulse 74, temperature (!) 97.2 °F (36.2 °C), temperature source Tympanic, height 5' 3\" (1.6 m), weight 98 kg (216 lb), SpO2 94%. Body mass index is 38.26 kg/m².      PHYSICAL EXAM:      General Appearance:   Alert, cooperative, no distress   HEENT:   Normocephalic, atraumatic, anicteric.     Neck:  Supple, symmetrical, trachea midline   Lungs:   Clear to auscultation bilaterally; no rales, rhonchi or wheezing; respirations unlabored    Heart::   Regular rate and rhythm; no murmur, rub, or gallop.   Abdomen:   Soft, non-tender, non-distended; normal bowel sounds; no masses, no organomegaly    Genitalia:   Deferred    Rectal:   Deferred    Extremities:  No cyanosis, clubbing or edema    Pulses:  2+ and symmetric    Skin:  No jaundice, rashes, or lesions    Lymph nodes:  No palpable cervical lymphadenopathy        Lab Results:   No visits with results within 1 Day(s) from this visit.   Latest known visit with results is:   Procedure visit on 01/03/2024   Component Date Value   • Case Report 01/03/2024                      Value:Surgical Pathology Report                         Case: [id number]                                  Authorizing Provider:  Sukumar James MD          Collected:           01/03/2024 1720              Ordering Location:     [facility] [place]    Received:            01/03/2024 1720                          "            [facility]                                                             Pathologist:           Jose Hurley MD                                                                 Specimens:   A) - Endocervical, ECC                                                                              B) - Cervix, cervical biopsy @ 4:00                                                                 C) - Cervix, cervical biopsy @  6:00                                                                D) - Cervix, cervical biopsy @ 8:00                                                       • Final Diagnosis 01/03/2024                      Value:This result contains rich text formatting which cannot be displayed here.   • Additional Information 01/03/2024                      Value:This result contains rich text formatting which cannot be displayed here.   • Gross Description 01/03/2024                      Value:This result contains rich text formatting which cannot be displayed here.         Radiology Results:   No results found.    Lauren Dove PA-C     **Please note: Dictation voice to text software may have been used in the creation of this record. Occasional wrong word or “sound alike” substitutions may have occurred due to the inherent limitations of voice recognition software. Read the chart carefully and recognize, using context, where substitutions have occurred.**

## 2024-03-01 ENCOUNTER — RA CDI HCC (OUTPATIENT)
Dept: OTHER | Facility: HOSPITAL | Age: 59
End: 2024-03-01

## 2024-03-01 NOTE — PROGRESS NOTES
HCC coding opportunities          Chart Reviewed number of suggestions sent to Provider: 1     Patients Insurance        Commercial Insurance: [employer]       J45.41: Moderate persistent asthma with acute exacerbation     Last assessed on 9/18/2023 please review and assess and document per MEAT if applicable for 2024

## 2024-03-13 ENCOUNTER — APPOINTMENT (OUTPATIENT)
Dept: LAB | Facility: CLINIC | Age: 59
End: 2024-03-13
Payer: COMMERCIAL

## 2024-03-13 DIAGNOSIS — K76.0 NAFLD (NONALCOHOLIC FATTY LIVER DISEASE): ICD-10-CM

## 2024-03-13 DIAGNOSIS — E11.9 TYPE 2 DIABETES MELLITUS WITHOUT COMPLICATION, WITHOUT LONG-TERM CURRENT USE OF INSULIN (HCC): ICD-10-CM

## 2024-03-13 LAB
ALBUMIN SERPL BCP-MCNC: 4.4 G/DL (ref 3.5–5)
ALP SERPL-CCNC: 55 U/L (ref 34–104)
ALT SERPL W P-5'-P-CCNC: 41 U/L (ref 7–52)
ANION GAP SERPL CALCULATED.3IONS-SCNC: 12 MMOL/L (ref 4–13)
AST SERPL W P-5'-P-CCNC: 35 U/L (ref 13–39)
BASOPHILS # BLD AUTO: 0.06 THOUSANDS/ÂΜL (ref 0–0.1)
BASOPHILS NFR BLD AUTO: 1 % (ref 0–1)
BILIRUB DIRECT SERPL-MCNC: 0.05 MG/DL (ref 0–0.2)
BILIRUB SERPL-MCNC: 0.6 MG/DL (ref 0.2–1)
BUN SERPL-MCNC: 16 MG/DL (ref 5–25)
CALCIUM SERPL-MCNC: 9.6 MG/DL (ref 8.4–10.2)
CHLORIDE SERPL-SCNC: 103 MMOL/L (ref 96–108)
CHOLEST SERPL-MCNC: 106 MG/DL
CO2 SERPL-SCNC: 26 MMOL/L (ref 21–32)
CREAT SERPL-MCNC: 0.9 MG/DL (ref 0.6–1.3)
CREAT UR-MCNC: 161.2 MG/DL
EOSINOPHIL # BLD AUTO: 0.28 THOUSAND/ÂΜL (ref 0–0.61)
EOSINOPHIL NFR BLD AUTO: 4 % (ref 0–6)
ERYTHROCYTE [DISTWIDTH] IN BLOOD BY AUTOMATED COUNT: 12.1 % (ref 11.6–15.1)
EST. AVERAGE GLUCOSE BLD GHB EST-MCNC: 123 MG/DL
GFR SERPL CREATININE-BSD FRML MDRD: 70 ML/MIN/1.73SQ M
GLUCOSE P FAST SERPL-MCNC: 97 MG/DL (ref 65–99)
HBA1C MFR BLD: 5.9 %
HCT VFR BLD AUTO: 39.9 % (ref 34.8–46.1)
HDLC SERPL-MCNC: 44 MG/DL
HGB BLD-MCNC: 13.3 G/DL (ref 11.5–15.4)
IMM GRANULOCYTES # BLD AUTO: 0.02 THOUSAND/UL (ref 0–0.2)
IMM GRANULOCYTES NFR BLD AUTO: 0 % (ref 0–2)
LDLC SERPL CALC-MCNC: 42 MG/DL (ref 0–100)
LYMPHOCYTES # BLD AUTO: 2.64 THOUSANDS/ÂΜL (ref 0.6–4.47)
LYMPHOCYTES NFR BLD AUTO: 33 % (ref 14–44)
MCH RBC QN AUTO: 31.7 PG (ref 26.8–34.3)
MCHC RBC AUTO-ENTMCNC: 33.3 G/DL (ref 31.4–37.4)
MCV RBC AUTO: 95 FL (ref 82–98)
MICROALBUMIN UR-MCNC: <7 MG/L
MICROALBUMIN/CREAT 24H UR: <4 MG/G CREATININE (ref 0–30)
MONOCYTES # BLD AUTO: 0.5 THOUSAND/ÂΜL (ref 0.17–1.22)
MONOCYTES NFR BLD AUTO: 6 % (ref 4–12)
NEUTROPHILS # BLD AUTO: 4.6 THOUSANDS/ÂΜL (ref 1.85–7.62)
NEUTS SEG NFR BLD AUTO: 56 % (ref 43–75)
NRBC BLD AUTO-RTO: 0 /100 WBCS
PLATELET # BLD AUTO: 271 THOUSANDS/UL (ref 149–390)
PMV BLD AUTO: 11.8 FL (ref 8.9–12.7)
POTASSIUM SERPL-SCNC: 4.2 MMOL/L (ref 3.5–5.3)
PROT SERPL-MCNC: 7.1 G/DL (ref 6.4–8.4)
RBC # BLD AUTO: 4.2 MILLION/UL (ref 3.81–5.12)
SODIUM SERPL-SCNC: 141 MMOL/L (ref 135–147)
TRIGL SERPL-MCNC: 100 MG/DL
TSH SERPL DL<=0.05 MIU/L-ACNC: 1.59 UIU/ML (ref 0.45–4.5)
WBC # BLD AUTO: 8.1 THOUSAND/UL (ref 4.31–10.16)

## 2024-03-13 PROCEDURE — 80053 COMPREHEN METABOLIC PANEL: CPT

## 2024-03-13 PROCEDURE — 82570 ASSAY OF URINE CREATININE: CPT

## 2024-03-13 PROCEDURE — 84443 ASSAY THYROID STIM HORMONE: CPT

## 2024-03-13 PROCEDURE — 82043 UR ALBUMIN QUANTITATIVE: CPT

## 2024-03-13 PROCEDURE — 36415 COLL VENOUS BLD VENIPUNCTURE: CPT

## 2024-03-13 PROCEDURE — 80061 LIPID PANEL: CPT

## 2024-03-13 PROCEDURE — 82248 BILIRUBIN DIRECT: CPT

## 2024-03-13 PROCEDURE — 85025 COMPLETE CBC W/AUTO DIFF WBC: CPT

## 2024-03-13 PROCEDURE — 83036 HEMOGLOBIN GLYCOSYLATED A1C: CPT

## 2024-03-18 ENCOUNTER — OFFICE VISIT (OUTPATIENT)
Dept: FAMILY MEDICINE CLINIC | Facility: CLINIC | Age: 59
End: 2024-03-18
Payer: COMMERCIAL

## 2024-03-18 VITALS
OXYGEN SATURATION: 99 % | HEIGHT: 63 IN | HEART RATE: 67 BPM | TEMPERATURE: 97.7 F | WEIGHT: 203.8 LBS | BODY MASS INDEX: 36.11 KG/M2 | SYSTOLIC BLOOD PRESSURE: 122 MMHG | DIASTOLIC BLOOD PRESSURE: 80 MMHG

## 2024-03-18 DIAGNOSIS — E11.69 TYPE 2 DIABETES MELLITUS WITH OTHER SPECIFIED COMPLICATION, WITHOUT LONG-TERM CURRENT USE OF INSULIN (HCC): Primary | ICD-10-CM

## 2024-03-18 DIAGNOSIS — E66.01 CLASS 3 SEVERE OBESITY DUE TO EXCESS CALORIES WITH SERIOUS COMORBIDITY AND BODY MASS INDEX (BMI) OF 40.0 TO 44.9 IN ADULT (HCC): ICD-10-CM

## 2024-03-18 DIAGNOSIS — E03.8 OTHER SPECIFIED HYPOTHYROIDISM: ICD-10-CM

## 2024-03-18 DIAGNOSIS — R32 URINARY INCONTINENCE, UNSPECIFIED TYPE: ICD-10-CM

## 2024-03-18 DIAGNOSIS — F41.9 ANXIETY: ICD-10-CM

## 2024-03-18 DIAGNOSIS — E78.2 MIXED HYPERLIPIDEMIA: ICD-10-CM

## 2024-03-18 DIAGNOSIS — E11.9 TYPE 2 DIABETES MELLITUS WITHOUT COMPLICATION, WITHOUT LONG-TERM CURRENT USE OF INSULIN (HCC): ICD-10-CM

## 2024-03-18 PROCEDURE — 99214 OFFICE O/P EST MOD 30 MIN: CPT | Performed by: FAMILY MEDICINE

## 2024-03-18 RX ORDER — ESCITALOPRAM OXALATE 10 MG/1
10 TABLET ORAL DAILY
Qty: 90 TABLET | Refills: 0 | Status: SHIPPED | OUTPATIENT
Start: 2024-03-18 | End: 2024-06-16

## 2024-03-18 RX ORDER — LEVOTHYROXINE SODIUM 0.03 MG/1
25 TABLET ORAL
Qty: 30 TABLET | Refills: 5 | Status: SHIPPED | OUTPATIENT
Start: 2024-03-18 | End: 2024-03-20 | Stop reason: SDUPTHER

## 2024-03-18 RX ORDER — OXYBUTYNIN CHLORIDE 10 MG/1
10 TABLET, EXTENDED RELEASE ORAL
Qty: 90 TABLET | Refills: 0 | Status: SHIPPED | OUTPATIENT
Start: 2024-03-18 | End: 2024-06-16

## 2024-03-18 RX ORDER — METFORMIN HYDROCHLORIDE 500 MG/1
1000 TABLET, EXTENDED RELEASE ORAL
Qty: 180 TABLET | Refills: 0 | Status: SHIPPED | OUTPATIENT
Start: 2024-03-18 | End: 2024-06-16

## 2024-03-18 RX ORDER — ATORVASTATIN CALCIUM 10 MG/1
10 TABLET, FILM COATED ORAL DAILY
Qty: 90 TABLET | Refills: 1 | Status: SHIPPED | OUTPATIENT
Start: 2024-03-18

## 2024-03-18 NOTE — ASSESSMENT & PLAN NOTE
Lab Results   Component Value Date    HGBA1C 5.9 (H) 03/13/2024   Much  stable  she  lost 30 lbs  with diet and  exercise and is on ozampic  doing very well her A1c is 5.9   she is  up to date with eye  and  foot exam

## 2024-03-19 NOTE — PATIENT INSTRUCTIONS
Type 2 Diabetes Management for Adults   AMBULATORY CARE:   Type 2 diabetes  is a disease that affects how your body uses glucose (sugar). Either your body cannot make enough insulin, or it cannot use the insulin correctly. It is important to keep diabetes controlled to prevent damage to your heart, blood vessels, and other organs. Management will help you feel well and enjoy your daily activities. Your diabetes care team providers can help you make a plan to fit diabetes care into your schedule. Your plan can change over time to fit your needs and your family's needs.       Have someone call your local emergency number (911 in the [place]) if:   You cannot be woken.    You have signs of diabetic ketoacidosis:     confusion, fatigue    vomiting    rapid heartbeat    fruity smelling breath    extreme thirst    dry mouth and skin    You have any of the following signs of a heart attack:      Squeezing, pressure, or pain in your chest    You may  also have any of the following:     Discomfort or pain in your back, neck, jaw, stomach, or arm    Shortness of breath    Nausea or vomiting    Lightheadedness or a sudden cold sweat    You have any of the following signs of a stroke:      Numbness or drooping on one side of your face     Weakness in an arm or leg    Confusion or difficulty speaking    Dizziness, a severe headache, or vision loss    Call your doctor or diabetes care team provider if:   You have a sore or wound that will not heal.    You have a change in the amount you urinate.    Your blood sugar levels are higher than your target goals.    You often have lower blood sugar levels than your target goals.    Your skin is red, dry, warm, or swollen.    You have trouble coping with diabetes, or you feel anxious or depressed.    You have trouble following any part of your care plan, such as your meal plan.    You have questions or concerns about your condition or care.    What you need to know about high blood sugar  levels:  High blood sugar levels may not cause any symptoms. You may feel more thirsty or urinate more often than usual. Over time, high blood sugar levels can damage your nerves, blood vessels, tissues, and organs. The following can increase your blood sugar levels:  Large meals or large amounts of carbohydrates at one time    Less physical activity    Stress    Illness    A lower dose of diabetes medicine or insulin, or a late dose    What you need to know about low blood sugar levels:  Symptoms include feeling shaky, dizzy, irritable, or confused. You can prevent symptoms by keeping your blood sugar levels from going too low.  Treat a low blood sugar level right away:      Drink 4 ounces of juice or have 1 tube of glucose gel.    Check your blood sugar level again 10 to 15 minutes later.    When the level goes back to normal, eat a meal or snack to prevent another decrease.       Keep glucose gel, raisins, or hard candy with you at all times to treat a low blood sugar level.     Your blood sugar level can get too low if you take diabetes medicine or insulin and do not eat enough food.     If you use insulin, check your blood sugar level before you exercise.      If your blood sugar level is below 100 mg/dL, eat 4 crackers or 2 ounces of raisins, or drink 4 ounces of juice.    Check your level every 30 minutes if you exercise longer than 1 hour.    You may need a snack during or after exercise.    What you can do to manage your blood sugar levels:   Check your blood sugar levels as directed and as needed.  Several items are available to use to check your levels. You may need to check by testing a drop of blood in a glucose monitor. You may instead be given a continuous glucose monitoring (CGM) device. The device is worn at all times. The CGM checks your blood sugar level every 5 minutes. It sends results to an electronic device such as a smart phone. A CGM can be used with or without an insulin pump. You and your  diabetes care team providers will decide on the best method for you. The goal for blood sugar levels before meals  is between 80 and 130 mg/dL and 2 hours after eating  is lower than 180 mg/dL.            Make healthy food choices.  Work with a dietitian to create a meal plan that works for you and your schedule. A dietitian can help you learn how to eat the right amount of carbohydrates (sugar and starchy foods) during your meals and snacks. Examples of carbohydrates are breads, cereals, rice, pasta, fruit, low-fat dairy, and sweets. Carbohydrates can raise your blood sugar level if you eat too many at one time.         Eat high-fiber foods as directed.  Fiber helps improve blood sugar levels. Fiber also lowers your risk for heart disease and other problems diabetes can cause. Examples of high-fiber foods include vegetables, whole-grain bread, and beans such as roberts beans. Your dietitian can tell you how much fiber to have each day.         Get regular physical activity.  Physical activity can help you get to your target blood sugar level goal and manage your weight. Get at least 150 minutes of moderate to vigorous aerobic physical activity each week. Resistance training, such as lifting weights, should be done 3 times each week. Do not miss more than 2 days of physical activity in a row. Do not sit longer than 30 minutes at a time. Your healthcare provider can help you create an activity plan. The plan can include the best activities for you and can help you build your strength and endurance.            Maintain a healthy weight.  Ask your team what a healthy weight is for you. A healthy weight can help you control diabetes and prevent heart disease. Ask your team to help you create a weight-loss plan, if needed. Even a loss of 3% to 7% of your excess body weight can help make a difference in managing diabetes. Your team will help you set a weight-loss goal, such as 10 to 15 pounds, or 5% of your extra weight.  Together you and your team can set manageable weight-loss goals.    Take your diabetes medicine or insulin as directed.  You may need diabetes medicine, insulin, or both to help control your blood sugar levels. Your healthcare provider will teach you how and when to take your diabetes medicine or insulin. You will also be taught about side effects oral diabetes medicine can cause. Insulin may be injected or given through a pump or pen. You and your providers will decide on the best method for you:    An insulin pump  is an implanted device that gives your insulin 24 hours a day. An insulin pump prevents the need for multiple insulin injections in a day.         An insulin pen  is a device prefilled with the right amount of insulin.         You and your family members will be taught how to draw up and give insulin  if this is the best method for you. Your providers will also teach you how to dispose of needles and syringes.    You will learn how much insulin you need  and when to give it. You will be taught when not to give insulin. You will also be taught what to do if your blood sugar level drops too low. This may happen if you take insulin and do not eat the right amount of carbohydrates.    More ways to manage type 2 diabetes:   Wear medical alert identification.  Wear medical alert jewelry or carry a card that says you have diabetes. Ask your provider where to get these items.         Do not smoke.  Nicotine and other chemicals in cigarettes and cigars can cause lung and blood vessel damage. It also makes it more difficult to manage your diabetes. Ask your provider for information if you currently smoke and need help to quit. Do not use e-cigarettes or smokeless tobacco in place of cigarettes or to help you quit. They still contain nicotine.    Check your feet each day for cuts, scratches, calluses, or other wounds.  Look for redness and swelling, and feel for warmth. Wear shoes that fit well. Check your shoes  for rocks or other objects that can hurt your feet. Do not walk barefoot or wear shoes without socks. Wear cotton socks to help keep your feet dry.         Ask about vaccines you may need.  You have a higher risk for serious illness if you get the flu, pneumonia, COVID-19, or hepatitis. Ask your provider if you should get vaccines to prevent these or other diseases, and when to get the vaccines.    Talk to your provider if you become stressed about diabetes care.  Sometimes being able to fit diabetes care into your life can cause increased stress. The stress can cause you not to take care of yourself properly. Your provider can help by offering tips about self-care. A mental health provider can listen and offer help with self-care issues. Other types of counseling can help you make nutrition or physical activity changes.    Have your A1c checked as directed.  Your provider may check your A1c every 3 months, or 2 times each year if your diabetes is controlled. An A1c test shows the average amount of sugar in your blood over the past 2 to 3 months. Your provider will tell you what your A1c level should be.    Have screening tests as directed.  Your provider may recommend screening for complications of diabetes and other conditions that may develop. Some screenings may begin right away and some may happen within the first 5 years of diagnosis:    Examples of diabetes complications  include kidney problems, high cholesterol, high blood pressure, blood vessel problems, eye problems, and sleep apnea.    You may be screened for a low vitamin B level  if you take oral diabetes medicine for a long time.    You may be screened for polycystic ovarian syndrome (PCOS)  if you are of childbearing age.    Follow up with your doctor or diabetes care team providers as directed:  You may need to have blood tests done before your follow-up visit. The test results will show if changes need to be made in your treatment or self-care.  Talk to your provider if you cannot afford your medicine. Write down your questions so you remember to ask them during your visits.  © Copyright [employer] 2023 Information is for End User's use only and may not be sold, redistributed or otherwise used for commercial purposes.  The above information is an  only. It is not intended as medical advice for individual conditions or treatments. Talk to your doctor, nurse or pharmacist before following any medical regimen to see if it is safe and effective for you.    What to Do if Your Blood Sugar is Low   AMBULATORY CARE:   Low blood sugar levels  (hypoglycemia) can happen with Type 1 and Type 2 diabetes. Low levels are more likely to happen if you use insulin. Hypoglycemia can cause you to have falls, accidents, and injuries. A blood sugar level that gets too low can lead to seizures, coma, and death. Learn to recognize the symptoms early so you can get treatment quickly.  When your blood sugar is low you may feel:  Sweaty    Nervous or shaky    Anxious or irritable    Confused    A fast, pounding heartbeat    Extremely hungry    Have someone call your local emergency number (911 in the ) if:   You cannot be woken.    You have a seizure.    Call your doctor if:   You have symptoms of a low blood sugar level, such as trouble thinking, sweating, or a pounding heartbeat.    Your blood sugar level is lower than normal and it does not improve with treatment.     You often have lower blood sugar levels than your target goals.    You have trouble coping with your illness, or you feel anxious or depressed.     You have questions or concerns about your condition or care.    What to do if you have symptoms of low blood sugar:   Check your blood sugar level, if possible.  Your blood sugar level is too low if it is at or below 70 mg/dL.     Eat or drink 15 grams of fast-acting carbohydrate. Fast-acting carbohydrates will raise your blood sugar level quickly. Examples  of 15 grams of fast-acting carbohydrates:     4 ounces (½ cup) of fruit juice     4 ounces of regular soda    2 tablespoons of raisins     1 tube of glucose gel or 3 to 4 glucose tablets       Check your blood sugar level 15 minutes later.  If the level is still low (less than 100 mg/dL), eat another 15 grams of carbohydrate. When the level returns to 100 mg/dL, eat a snack or meal that contains carbohydrates. This will help prevent another drop in blood sugar.    Teach people close to you how to use your glucagon kit.  Your blood sugar may be too low for you to be awake. People need to know when and how to use your kit.    Prevent low blood sugar levels:  Prevent low blood sugar by knowing what increases your risk. Ask your healthcare provider for ways to prevent low blood sugar levels. Any of the following can increase your risk of low blood sugar:  Fasting for tests or procedures    During or after intense exercise    Late or postponed meals    Sleeping (you may need a bedtime snack)     Drinking alcohol if you use insulin or insulin releasing pills    Follow up with your doctor as directed:  Write down your questions so you remember to ask them during your visits.  © Copyright [employer] 2023 Information is for End User's use only and may not be sold, redistributed or otherwise used for commercial purposes.  The above information is an  only. It is not intended as medical advice for individual conditions or treatments. Talk to your doctor, nurse or pharmacist before following any medical regimen to see if it is safe and effective for you.

## 2024-03-19 NOTE — PROGRESS NOTES
Assessment/Plan:as  below         Problem List Items Addressed This Visit          Endocrine    Type 2 diabetes mellitus without complication, without long-term current use of insulin (Formerly [facility])    Relevant Medications    metFORMIN (GLUCOPHAGE-XR) 500 mg 24 hr tablet    semaglutide, 0.25 or 0.5 mg/dose, (Ozempic, 0.25 or 0.5 MG/DOSE,) 2 mg/3 mL injection pen    Other specified hypothyroidism    Relevant Medications    levothyroxine (Levoxyl) 25 mcg tablet    Other Relevant Orders    TSH, 3rd generation with Free T4 reflex    Type 2 diabetes mellitus with other specified complication, without long-term current use of insulin (Formerly [facility]) - Primary       Lab Results   Component Value Date    HGBA1C 5.9 (H) 03/13/2024   Much  stable  she  lost 30 lbs  with diet and  exercise and is on ozampic  doing very well her A1c is 5.9   she is  up to date with eye  and  foot exam         Relevant Medications    metFORMIN (GLUCOPHAGE-XR) 500 mg 24 hr tablet    semaglutide, 0.25 or 0.5 mg/dose, (Ozempic, 0.25 or 0.5 MG/DOSE,) 2 mg/3 mL injection pen    Other Relevant Orders    Albumin / creatinine urine ratio    Comprehensive metabolic panel    Hemoglobin A1C    CBC and differential    Albumin / creatinine urine ratio    Lipid Panel with Direct LDL reflex       Behavioral Health    Anxiety     Much  stable  no  anxiety  anymore         Relevant Medications    escitalopram (LEXAPRO) 10 mg tablet       Other    Class 3 severe obesity due to excess calories with serious comorbidity and body mass index (BMI) of 40.0 to 44.9 in adult (Formerly [facility])     Doing so well lost 30 lbs weight continue   doing your good  work         Mixed hyperlipidemia     Much  stable now  her labs  reviewed  well in limits         Relevant Medications    atorvastatin (LIPITOR) 10 mg tablet     Other Visit Diagnoses       Urinary incontinence, unspecified type        Relevant Medications    oxybutynin (DITROPAN-XL) 10 MG 24 hr tablet              Subjective:      Patient ID:  Jing Ortiz is a 58 y.o. female.    HPI-  came  in  to  follow up  on her  multiple  chronic  health  conditions  she  is  doing much  better  in  terms of  her  dm2 hyperlipidemia   and  anxiety  after  her  weight loss   urinary incontinence  stable  tsh normal  no  complaints  today    The following portions of the patient's history were reviewed and updated as appropriate:   Past Medical History:  She has a past medical history of Anxiety (09/16/2020), Arthritis, Asthma, Bile duct stricture, Class 2 obesity due to excess calories in adult (02/24/2021), Contusion of ribs, right, initial encounter (09/22/2021), CPAP (continuous positive airway pressure) dependence, Diabetes mellitus (HCC), Fatty liver, Functional urinary incontinence (09/14/2020), HPV (human papilloma virus) infection, Hyperlipidemia, Hypertension, Kidney stone, Moderate persistent asthma with acute exacerbation (09/16/2020), Obesity, Sleep apnea, Trigger ring finger of right hand (12/15/2022), Type 2 diabetes mellitus without complication, without long-term current use of insulin (HCC) (09/16/2020), and Vitamin D deficiency.,  _______________________________________________________________________  Medical Problems:  does not have any pertinent problems on file.,  _______________________________________________________________________  Past Surgical History:   has a past surgical history that includes Tonsillectomy; Colonoscopy (01/01/2015); Hysteroscopy (01/01/2008); Other surgical history (1998); Cyst Removal; Mammo (historical) (01/04/2016); pr tendon sheath incision (Right, 02/06/2023); Upper gastrointestinal endoscopy; and Colonoscopy.,  _______________________________________________________________________  Family History:  family history includes ADD / ADHD in her father; Anxiety disorder in her father; Arthritis in her brother, father, and mother; Bipolar disorder in her brother; Brain cancer in her paternal aunt; Breast cancer (age  of onset: 30) in her maternal aunt and maternal aunt; Breast cancer (age of onset: 65) in her maternal aunt; COPD in her father and mother; Cancer (age of onset: 49) in her brother; Depression in her brother, father, and mother; Diabetes in her mother; Drug abuse in her brother; Heart attack in her mother; Heart disease in her father and mother; Hyperlipidemia in her mother; Hypertension in her father and mother; Mental illness in her brother, father, and mother; No Known Problems in her paternal aunt, paternal aunt, paternal aunt, and paternal aunt; Obesity in her brother, father, and mother; Rheum arthritis in her father and mother; Substance Abuse in her brother; Suicide Attempts in her brother.,  _______________________________________________________________________  Social History:   reports that she has never smoked. She has never used smokeless tobacco. She reports current alcohol use. She reports that she does not use drugs.,  _______________________________________________________________________  Allergies:  is allergic to bee venom, pneumococcal vaccines, and other..  _______________________________________________________________________  Current Outpatient Medications   Medication Sig Dispense Refill    acetaminophen (TYLENOL) 325 mg tablet Take 650 mg by mouth every 6 (six) hours as needed for mild pain      Albuterol (PROVENTIL IN) Inhale      atorvastatin (LIPITOR) 10 mg tablet Take 1 tablet (10 mg total) by mouth daily 90 tablet 1    b complex vitamins tablet Take 1 tablet by mouth daily      BORON PO Take 1 Capful by mouth in the morning      cholecalciferol (VITAMIN D3) 1,000 units tablet Take 10,000 Units by mouth daily Only on Sundays      EPINEPHrine (EPIPEN JR) 0.15 mg/0.3 mL SOAJ Inject 0.3 mg into a muscle once      escitalopram (LEXAPRO) 10 mg tablet Take 1 tablet (10 mg total) by mouth daily 90 tablet 0    fluticasone (FLONASE) 50 mcg/act nasal spray 2 sprays into each nostril daily Per  nare daily      levothyroxine (Levoxyl) 25 mcg tablet Take 1 tablet (25 mcg total) by mouth daily in the early morning 30 tablet 5    Loratadine 10 MG CAPS Take by mouth daily after breakfast      metFORMIN (GLUCOPHAGE-XR) 500 mg 24 hr tablet Take 2 tablets (1,000 mg total) by mouth daily with breakfast 180 tablet 0    omeprazole (PriLOSEC) 40 MG capsule Take 1 capsule (40 mg total) by mouth daily before breakfast 90 capsule 1    oxybutynin (DITROPAN-XL) 10 MG 24 hr tablet Take 1 tablet (10 mg total) by mouth daily at bedtime 90 tablet 0    semaglutide, 0.25 or 0.5 mg/dose, (Ozempic, 0.25 or 0.5 MG/DOSE,) 2 mg/3 mL injection pen Inject 0.75 mL (0.5 mg total) under the skin every 7 days for 30 days, THEN 0.75 mL (0.5 mg total) every 7 days. 9 mL 0    levalbuterol (Xopenex) 1.25 mg/3 mL nebulizer solution Take 3 mL (1.25 mg total) by nebulization 3 (three) times a day (Patient not taking: Reported on 3/18/2024) 90 mL 3     No current facility-administered medications for this visit.     _______________________________________________________________________  Review of Systems   Constitutional:  Negative for fatigue and fever.   HENT:  Negative for congestion and postnasal drip.    Eyes:  Negative for pain and discharge.   Respiratory:  Negative for cough and shortness of breath.    Cardiovascular:  Negative for chest pain, palpitations and leg swelling.        Hyperlipidemia   Gastrointestinal:  Negative for abdominal distention, abdominal pain and constipation.   Endocrine: Negative for cold intolerance, heat intolerance and polydipsia.        Dm2 hypothyrodism   Genitourinary:  Negative for dysuria, flank pain, pelvic pain and urgency.   Musculoskeletal:  Negative for arthralgias and back pain.   Neurological:  Negative for dizziness, numbness and headaches.   Psychiatric/Behavioral:  Negative for sleep disturbance. The patient is not nervous/anxious.         Anxiety stable         Objective:  Vitals:    03/18/24  "1943   BP: 122/80   BP Location: Left arm   Patient Position: Sitting   Cuff Size: Standard   Pulse: 67   Temp: 97.7 °F (36.5 °C)   TempSrc: Temporal   SpO2: 99%   Weight: 92.4 kg (203 lb 12.8 oz)   Height: 5' 3\" (1.6 m)     Body mass index is 36.1 kg/m².     Physical Exam  Vitals and nursing note reviewed.   Constitutional:       General: She is not in acute distress.     Appearance: Normal appearance. She is not ill-appearing, toxic-appearing or diaphoretic.   HENT:      Head: Normocephalic.      Nose: Nose normal. No congestion.      Mouth/Throat:      Mouth: Mucous membranes are moist.      Pharynx: Oropharynx is clear. No oropharyngeal exudate.   Eyes:      Extraocular Movements: Extraocular movements intact.      Conjunctiva/sclera: Conjunctivae normal.      Pupils: Pupils are equal, round, and reactive to light.   Cardiovascular:      Rate and Rhythm: Normal rate and regular rhythm.      Pulses: Normal pulses.      Heart sounds: Normal heart sounds. No murmur heard.     No gallop.   Pulmonary:      Effort: Pulmonary effort is normal.      Breath sounds: Normal breath sounds. No wheezing, rhonchi or rales.   Abdominal:      General: There is no distension.      Palpations: There is no mass.      Tenderness: There is no abdominal tenderness.   Musculoskeletal:      Cervical back: Normal range of motion and neck supple. No rigidity or tenderness.      Right lower leg: No edema.      Left lower leg: No edema.   Lymphadenopathy:      Cervical: No cervical adenopathy.   Skin:     Findings: No erythema or rash.   Neurological:      General: No focal deficit present.      Mental Status: She is alert and oriented to person, place, and time.   Psychiatric:         Mood and Affect: Mood normal.         Thought Content: Thought content normal.         "

## 2024-03-20 DIAGNOSIS — E03.8 OTHER SPECIFIED HYPOTHYROIDISM: ICD-10-CM

## 2024-03-20 RX ORDER — LEVOTHYROXINE SODIUM 0.03 MG/1
25 TABLET ORAL
Qty: 90 TABLET | Refills: 1 | Status: SHIPPED | OUTPATIENT
Start: 2024-03-20

## 2024-03-21 ENCOUNTER — PATIENT MESSAGE (OUTPATIENT)
Dept: FAMILY MEDICINE CLINIC | Facility: CLINIC | Age: 59
End: 2024-03-21

## 2024-04-09 ENCOUNTER — FITNESS (OUTPATIENT)
Age: 59
End: 2024-04-09

## 2024-04-09 NOTE — PROGRESS NOTES
Fitness Plan of Care    Exercise session completed: 1  Referring Provider: [email or web address]  Diagnosis: NEEDS TO GET STARTED ON A WORKOUT REGIMENT     Risk Factors  Cholesterol:   Smoking:   HTN: 126 / 83  DM:   Obesity: 34.3 BMI  Inactivity:   Stress: N/A    Summary of progress:   Client specific Goals: KEEP BUILD MUSCLE, LOSS DUE TO OZEMPIC   Goals for dietary modification: GET STARTED ON A WORKOUT REGIMENT    Lifestyle Assessment Score  LMA Initial Score: 42  LMA Final Score:     Exercise Prescription  Cardiovascular  Frequency: 4 - 6   Minutes of Exercise: 20 - 60 MIN  Mets:   HR: 105 - 136  Modalities: TREADMILL, ELLIPTICAL, BIKE    Strength Training  Frequency: 2 - 3 DAYS OUT THE WEEK  Repetitions: 8 - 12 REPS  Sets: 2 - 3 SET  Modalities: MACHINES, DUMBBELLS , BODY WEIGHT    Home Exercise:  Frequency: 3 - 6  Minutes of exercise: 20 - 60 MIN  Comments: WALKING OUTSIDE                        Goals for dietary modification:

## 2024-04-18 ENCOUNTER — AMB VIDEO VISIT (OUTPATIENT)
Dept: OTHER | Facility: HOSPITAL | Age: 59
End: 2024-04-18

## 2024-04-18 DIAGNOSIS — J20.9 ACUTE BRONCHITIS, UNSPECIFIED ORGANISM: Primary | ICD-10-CM

## 2024-04-18 PROCEDURE — ECARE PR SL URGENT CARE VIRTUAL VISIT: Performed by: NURSE PRACTITIONER

## 2024-04-18 RX ORDER — METHYLPREDNISOLONE 4 MG/1
TABLET ORAL
Qty: 21 TABLET | Refills: 0 | Status: SHIPPED | OUTPATIENT
Start: 2024-04-18

## 2024-04-18 RX ORDER — AZITHROMYCIN 250 MG/1
TABLET, FILM COATED ORAL
Qty: 6 TABLET | Refills: 0 | Status: SHIPPED | OUTPATIENT
Start: 2024-04-18 | End: 2024-04-22

## 2024-04-18 NOTE — PROGRESS NOTES
Required Documentation:  Encounter provider BERTHA Brian    Provider located at [facility]  VIRTUAL CARE   [address] [address]    Identify all parties in room with patient during virtual visit:  No one else    The patient was identified by name and date of birth. Jing Ortiz was informed that this is a telemedicine visit and that the visit is being conducted through the [employer] platform. She agrees to proceed..  My office door was closed. No one else was in the room.  She acknowledged consent and understanding of privacy and security of the video platform. The patient has agreed to participate and understands they can discontinue the visit at any time.    Verification of patient location:    Patient is located at Home in the following state in which I hold an active license [place]    Patient is aware this is a billable service.     Reason for visit is No chief complaint on file.       Subjective  This is a 58 year old female here today for video visit.  She states she has history of chronic bronchitis.  She states she has cough.  She states 2 days ago she started with cough.  She states the cough is productive for thick yellow secretions.  She states yesterday she started with more dry cough.  She states she is also having headache.  She states she started going to the gym also for the last 2 days. She states that zpack and medrol dose pack have helped in the past.  She has not felt like she needed neb. She is a resp therapist.            Past Medical History:   Diagnosis Date    Anxiety 09/16/2020    Arthritis     Asthma     Bile duct stricture     Class 2 obesity due to excess calories in adult 02/24/2021    Contusion of ribs, right, initial encounter 09/22/2021    CPAP (continuous positive airway pressure) dependence     Diabetes mellitus (HCC)     Fatty liver     Functional urinary incontinence 09/14/2020    HPV (human papilloma  virus) infection     Hyperlipidemia     Hypertension     Kidney stone     Moderate persistent asthma with acute exacerbation 09/16/2020    Obesity     Sleep apnea     Trigger ring finger of right hand 12/15/2022    Type 2 diabetes mellitus without complication, without long-term current use of insulin ([facility]) 09/16/2020    Vitamin D deficiency        Past Surgical History:   Procedure Laterality Date    COLONOSCOPY  01/01/2015    tiny polyp=next 2020    COLONOSCOPY      CYST REMOVAL      thumb cyst    HYSTEROSCOPY  01/01/2008    with biopsy    MAMMO (HISTORICAL)  01/04/2016    normal=[facility]    OTHER SURGICAL HISTORY  1998    tongue papiloma    [place] TENDON SHEATH INCISION Right 02/06/2023    Procedure: RELEASE TRIGGER FINGER-right ring finger;  Surgeon: Nalini Conner;  Location:  MAIN OR;  Service: Orthopedics    TONSILLECTOMY      UPPER GASTROINTESTINAL ENDOSCOPY          Allergies   Allergen Reactions    Bee Venom Anaphylaxis, Hives, Shortness Of Breath, Throat Swelling and Wheezing    Pneumococcal Vaccines Hives     Anaphylaxis as per pt    Other Rash and Sneezing     Beech, birch (rash), maple tree pollen. Dog and cat danger.        Review of Systems   Constitutional:  Positive for fatigue. Negative for activity change, chills and fever.   HENT:  Positive for congestion and rhinorrhea.    Respiratory:  Positive for cough. Negative for shortness of breath and wheezing.    Cardiovascular: Negative.    Neurological: Negative.    Psychiatric/Behavioral: Negative.         Video Exam    There were no vitals filed for this visit.    Physical Exam  Constitutional:       General: She is not in acute distress.     Appearance: Normal appearance. She is not ill-appearing or toxic-appearing.   HENT:      Head: Normocephalic and atraumatic.   Eyes:      Conjunctiva/sclera: Conjunctivae normal.   Pulmonary:      Effort: Pulmonary effort is normal. No respiratory distress.      Comments: Slight cough during video  visit.   Neurological:      Mental Status: She is alert and oriented to person, place, and time.   Psychiatric:         Mood and Affect: Mood normal.         Behavior: Behavior normal.         Thought Content: Thought content normal.         Judgment: Judgment normal.         Visit Time  Total Visit Duration: 8 minutes    Assessment/Plan:    Diagnoses and all orders for this visit:    Acute bronchitis, unspecified organism  -     azithromycin (ZITHROMAX) 250 mg tablet; Take 2 tablets today then 1 tablet daily x 4 days  -     methylprednisolone (MEDROL) 4 mg tablet; Medrol dose pack Take as directed.        Patient Instructions   Rest and drink extra fluids.  Start steroid if no improvement after 2-3 days start antibiotic.   Cool mist humidification can be helpful.  Follow up with PCP if no improvement.  Go to ER with worsening symptoms.     Acute Bronchitis   AMBULATORY CARE:   Acute bronchitis  is swelling and irritation in the air passages of your lungs. This irritation may cause you to cough or have other breathing problems. Acute bronchitis often starts because of another illness, such as a cold or the flu. The illness spreads from your nose and throat to your windpipe and airways. Bronchitis is often called a chest cold. Acute bronchitis lasts about 3 to 6 weeks and is usually not a serious illness. Your cough can last for several weeks.   You may have any of the following symptoms:   A cough with sputum that may be clear, yellow, or green    Feeling more tired than usual, and body aches    A fever and chills    Wheezing when you breathe    A tight chest or pain when you breathe or cough  Seek care immediately if:   You cough up blood.    Your lips or fingernails turn blue.    You feel like you are not getting enough air when you breathe.  Contact your healthcare provider if:   You have a fever.    Your breathing problems do not go away or get worse.    Your cough does not get better within 4 weeks.    You have  questions or concerns about your condition or care.  Self-care:   Get more rest.  Rest helps your body to heal. Slowly start to do more each day. Rest when you feel it is needed.    Avoid irritants in the air.  Avoid chemicals, fumes, and dust. Wear a face mask if you must work around dust or fumes. Stay inside on days when air pollution levels are high. If you have allergies, stay inside when pollen counts are high. Do not use aerosol products, such as spray-on deodorant, bug spray, and hair spray.    Do not smoke or be around others who smoke.  Nicotine and other chemicals in cigarettes and cigars damages the cilia that move mucus out of your lungs. Ask your healthcare provider for information if you currently smoke and need help to quit. E-cigarettes or smokeless tobacco still contain nicotine. Talk to your healthcare provider before you use these products.     Drink liquids as directed.  Liquids help keep your air passages moist and help you cough up mucus. You may need to drink more liquids when you have acute bronchitis. Ask how much liquid to drink each day and which liquids are best for you.    Use a humidifier or vaporizer.  Use a cool mist humidifier or a vaporizer to increase air moisture in your home. This may make it easier for you to breathe and help decrease your cough.  Prevent acute bronchitis by doing the following:   Get the vaccinations you need.  Ask your healthcare provider if you should get vaccinated against the flu or pneumonia.    Prevent the spread of germs.  You can decrease your risk of acute bronchitis and other illnesses by doing the following:     Wash your hands often with soap and water. Carry germ-killing hand lotion or gel with you. You can use the lotion or gel to clean your hands when soap and water are not available.    Do not touch your eyes, nose, or mouth unless you have washed your hands first.    Always cover your mouth when you cough to prevent the spread of germs. It is  best to cough into a tissue or your shirt sleeve instead of into your hand. Ask those around you cover their mouths when they cough.    Try to avoid people who have a cold or the flu. If you are sick, stay away from others as much as possible.  Medicines:  Your healthcare provider may  give you any of the following:  Ibuprofen or acetaminophen  are medicines that help lower your fever. They are available without a doctor's order. Ask your healthcare provider which medicine is right for you. Ask how much to take and how often to take it. Follow directions. These medicines can cause stomach bleeding if not taken correctly. Ibuprofen can cause kidney damage. Do not take ibuprofen if you have kidney disease, an ulcer, or allergies to aspirin. Acetaminophen can cause liver damage. Do not take more than 4,000 milligrams in 24 hours.     Decongestants  help loosen mucus in your lungs and make it easier to cough up. This can help you breathe easier.    Cough suppressants  decrease your urge to cough. If your cough produces mucus, do not take a cough suppressant unless your healthcare provider tells you to. Your healthcare provider may suggest that you take a cough suppressant at night so you can rest.    Inhalers  may be given. Your healthcare provider may give you one or more inhalers to help you breathe easier and cough less. An inhaler gives your medicine to open your airways. Ask your healthcare provider to show you how to use your inhaler correctly.       Follow up with your healthcare provider as directed:  Write down questions you have so you will remember to ask them during your follow-up visits.  © 2017 [employer] Information is for End User's use only and may not be sold, redistributed or otherwise used for commercial purposes. All illustrations and images included in CareNotes® are the copyrighted property of [employer] or [employer].  The above information is an   only. It is not intended as medical advice for individual conditions or treatments. Talk to your doctor, nurse or pharmacist before following any medical regimen to see if it is safe and effective for you.

## 2024-04-18 NOTE — PATIENT INSTRUCTIONS
Rest and drink extra fluids.  Start steroid if no improvement after 2-3 days start antibiotic.   Cool mist humidification can be helpful.  Follow up with PCP if no improvement.  Go to ER with worsening symptoms.     Acute Bronchitis   AMBULATORY CARE:   Acute bronchitis  is swelling and irritation in the air passages of your lungs. This irritation may cause you to cough or have other breathing problems. Acute bronchitis often starts because of another illness, such as a cold or the flu. The illness spreads from your nose and throat to your windpipe and airways. Bronchitis is often called a chest cold. Acute bronchitis lasts about 3 to 6 weeks and is usually not a serious illness. Your cough can last for several weeks.   You may have any of the following symptoms:   A cough with sputum that may be clear, yellow, or green    Feeling more tired than usual, and body aches    A fever and chills    Wheezing when you breathe    A tight chest or pain when you breathe or cough  Seek care immediately if:   You cough up blood.    Your lips or fingernails turn blue.    You feel like you are not getting enough air when you breathe.  Contact your healthcare provider if:   You have a fever.    Your breathing problems do not go away or get worse.    Your cough does not get better within 4 weeks.    You have questions or concerns about your condition or care.  Self-care:   Get more rest.  Rest helps your body to heal. Slowly start to do more each day. Rest when you feel it is needed.    Avoid irritants in the air.  Avoid chemicals, fumes, and dust. Wear a face mask if you must work around dust or fumes. Stay inside on days when air pollution levels are high. If you have allergies, stay inside when pollen counts are high. Do not use aerosol products, such as spray-on deodorant, bug spray, and hair spray.    Do not smoke or be around others who smoke.  Nicotine and other chemicals in cigarettes and cigars damages the cilia that move  mucus out of your lungs. Ask your healthcare provider for information if you currently smoke and need help to quit. E-cigarettes or smokeless tobacco still contain nicotine. Talk to your healthcare provider before you use these products.     Drink liquids as directed.  Liquids help keep your air passages moist and help you cough up mucus. You may need to drink more liquids when you have acute bronchitis. Ask how much liquid to drink each day and which liquids are best for you.    Use a humidifier or vaporizer.  Use a cool mist humidifier or a vaporizer to increase air moisture in your home. This may make it easier for you to breathe and help decrease your cough.  Prevent acute bronchitis by doing the following:   Get the vaccinations you need.  Ask your healthcare provider if you should get vaccinated against the flu or pneumonia.    Prevent the spread of germs.  You can decrease your risk of acute bronchitis and other illnesses by doing the following:     Wash your hands often with soap and water. Carry germ-killing hand lotion or gel with you. You can use the lotion or gel to clean your hands when soap and water are not available.    Do not touch your eyes, nose, or mouth unless you have washed your hands first.    Always cover your mouth when you cough to prevent the spread of germs. It is best to cough into a tissue or your shirt sleeve instead of into your hand. Ask those around you cover their mouths when they cough.    Try to avoid people who have a cold or the flu. If you are sick, stay away from others as much as possible.  Medicines:  Your healthcare provider may  give you any of the following:  Ibuprofen or acetaminophen  are medicines that help lower your fever. They are available without a doctor's order. Ask your healthcare provider which medicine is right for you. Ask how much to take and how often to take it. Follow directions. These medicines can cause stomach bleeding if not taken correctly.  Ibuprofen can cause kidney damage. Do not take ibuprofen if you have kidney disease, an ulcer, or allergies to aspirin. Acetaminophen can cause liver damage. Do not take more than 4,000 milligrams in 24 hours.     Decongestants  help loosen mucus in your lungs and make it easier to cough up. This can help you breathe easier.    Cough suppressants  decrease your urge to cough. If your cough produces mucus, do not take a cough suppressant unless your healthcare provider tells you to. Your healthcare provider may suggest that you take a cough suppressant at night so you can rest.    Inhalers  may be given. Your healthcare provider may give you one or more inhalers to help you breathe easier and cough less. An inhaler gives your medicine to open your airways. Ask your healthcare provider to show you how to use your inhaler correctly.       Follow up with your healthcare provider as directed:  Write down questions you have so you will remember to ask them during your follow-up visits.  © 2017 [employer] Information is for End User's use only and may not be sold, redistributed or otherwise used for commercial purposes. All illustrations and images included in CareNotes® are the copyrighted property of [employer] or [employer].  The above information is an  only. It is not intended as medical advice for individual conditions or treatments. Talk to your doctor, nurse or pharmacist before following any medical regimen to see if it is safe and effective for you.

## 2024-04-18 NOTE — CARE ANYWHERE EVISITS
Visit Summary for СЕРГЕЙ CABRERA - Gender: Female - Date of Birth: 1965  Date: [id number] - Duration: 7 minutes  Patient: СЕРГЕЙ CABRERA  Provider: Alexandre STONE    Patient Contact Information  Address  [address] [place] [address]; [place] [address]  [phone]    Visit Topics  Cold [Added By: Self - 2024-04-18]  Headache [Added By: Self - 2024-04-18]  start a coughing two days ago. mild productive thick pale yellow secretions. has changed to a bronchitis cough. headache. general aches but that could be from going to the gym. [Added By: Self - 2024-04-18]    Triage Questions   What is your current physical address in the event of a medical emergency? Answer []  Are you allergic to any medications? Answer []  Are you now or could you be pregnant? Answer []  Do you have any immune system compromise or chronic lung   disease? Answer []  Do you have any vulnerable family members in the home (infant, pregnant, cancer, elderly)? Answer []     Conversation Transcripts  [0A][0A] [Notification] You are connected with Alexandre STONE, Urgent Care Specialist.[0A][Notification] СЕРГЕЙ CABRERA is located in [place].[0A][Notification] СЕРГЕЙ CABRERA has shared health history...[0A]    Diagnosis  Acute bronchitis    Procedures  Value: [id number] Code: CPT-4 UNLISTED E&M SERVICE    Medications Prescribed    No prescriptions ordered    Electronically signed by: Alexandre Farooq(NPI [id number])

## 2024-04-29 ENCOUNTER — TELEPHONE (OUTPATIENT)
Age: 59
End: 2024-04-29

## 2024-04-29 NOTE — TELEPHONE ENCOUNTER
Patient called to follow up on status of forms as she stated if it was not received today would cost her $450.  Spoke with clinical and was advised that the form would be completed and faxed over [employer].  Patient will receive a message in [employer] once it has been sent.  Patient stated she hoped it would be accepted as it was due at 5 pm today.

## 2024-04-29 NOTE — TELEPHONE ENCOUNTER
Jing called in stated that the paperwork she sent in on Thursday 4/25 needs to be submitted today in regards to her appointment on 3/18. She stated that anyone (BERTHA, or ) would be able to verify that she was seen and blood work was reviewed. Please Advise she would like to be notified today when sent over via fax that was given on paper work.

## 2024-05-14 ENCOUNTER — APPOINTMENT (OUTPATIENT)
Dept: LAB | Facility: CLINIC | Age: 59
End: 2024-05-14
Payer: COMMERCIAL

## 2024-05-14 DIAGNOSIS — E03.8 SUBCLINICAL HYPOTHYROIDISM: ICD-10-CM

## 2024-05-14 LAB
T4 FREE SERPL-MCNC: 0.66 NG/DL (ref 0.61–1.12)
TSH SERPL DL<=0.05 MIU/L-ACNC: 2.35 UIU/ML (ref 0.45–4.5)

## 2024-05-14 PROCEDURE — 86376 MICROSOMAL ANTIBODY EACH: CPT

## 2024-05-14 PROCEDURE — 86800 THYROGLOBULIN ANTIBODY: CPT

## 2024-05-14 PROCEDURE — 84439 ASSAY OF FREE THYROXINE: CPT

## 2024-05-14 PROCEDURE — 36415 COLL VENOUS BLD VENIPUNCTURE: CPT

## 2024-05-14 PROCEDURE — 84443 ASSAY THYROID STIM HORMONE: CPT

## 2024-05-15 ENCOUNTER — OFFICE VISIT (OUTPATIENT)
Dept: ENDOCRINOLOGY | Facility: CLINIC | Age: 59
End: 2024-05-15
Payer: COMMERCIAL

## 2024-05-15 VITALS
WEIGHT: 142.2 LBS | HEART RATE: 84 BPM | SYSTOLIC BLOOD PRESSURE: 102 MMHG | OXYGEN SATURATION: 98 % | TEMPERATURE: 98.1 F | DIASTOLIC BLOOD PRESSURE: 64 MMHG | BODY MASS INDEX: 25.19 KG/M2

## 2024-05-15 DIAGNOSIS — E03.8 SUBCLINICAL HYPOTHYROIDISM: ICD-10-CM

## 2024-05-15 DIAGNOSIS — E11.9 TYPE 2 DIABETES MELLITUS WITHOUT COMPLICATION, WITHOUT LONG-TERM CURRENT USE OF INSULIN (HCC): Primary | ICD-10-CM

## 2024-05-15 DIAGNOSIS — E78.2 MIXED HYPERLIPIDEMIA: ICD-10-CM

## 2024-05-15 DIAGNOSIS — E66.01 CLASS 3 SEVERE OBESITY DUE TO EXCESS CALORIES WITH SERIOUS COMORBIDITY AND BODY MASS INDEX (BMI) OF 40.0 TO 44.9 IN ADULT (HCC): ICD-10-CM

## 2024-05-15 LAB
THYROGLOB AB SERPL-ACNC: <1 IU/ML (ref 0–0.9)
THYROPEROXIDASE AB SERPL-ACNC: 22 IU/ML (ref 0–34)

## 2024-05-15 PROCEDURE — 99214 OFFICE O/P EST MOD 30 MIN: CPT | Performed by: STUDENT IN AN ORGANIZED HEALTH CARE EDUCATION/TRAINING PROGRAM

## 2024-05-15 NOTE — PROGRESS NOTES
Established patient Progress Note      Cc: diabetes    Referring Provider  No referring provider defined for this encounter.     History of Present Illness:   Jing Ortiz is a 59 y.o. female with a history of type 2 diabetes without long term use of insulin who presented for follow up.        Reports complications of none. Denies recent illness or hospitalizations.    Current regimen:   Ozempic 0.5 mg weekly  Metformin XR 1000 mg bid    SMBG : x 1 ,   Blood sugars ranging from 80 to 90      Opthamology: UTD  Podiatry: no        Has hypertension: followed by PCP; not on ACE inhibitor/ARB, }  Has hyperlipidemia: followed by PCP; on Lipitor 10 mg daily- tolerating well, no myalgias.     Thyroid disorders: subclinical hypothyroidism on levothyroxine 25 mcg once daily.     History of pancreatitis: no, no family or personal history MTC or MEN.       Patient Active Problem List   Diagnosis    Functional urinary incontinence    Type 2 diabetes mellitus without complication, without long-term current use of insulin (HCC)    Moderate persistent asthma with acute exacerbation    Anxiety    Class 3 severe obesity due to excess calories with serious comorbidity and body mass index (BMI) of 40.0 to 44.9 in adult (HCC)    Chronic bronchitis, unspecified chronic bronchitis type (HCC)    NICKY on CPAP    Fatty liver    Mixed hyperlipidemia    Acute bronchitis    Other specified hypothyroidism    Type 2 diabetes mellitus with other specified complication, without long-term current use of insulin (HCC)      Past Medical History:   Diagnosis Date    Anxiety 09/16/2020    Arthritis     Asthma     Bile duct stricture     Class 2 obesity due to excess calories in adult 02/24/2021    Contusion of ribs, right, initial encounter 09/22/2021    CPAP (continuous positive airway pressure) dependence     Diabetes mellitus (HCC)      Fatty liver     Functional urinary incontinence 2020    HPV (human papilloma virus) infection     Hyperlipidemia     Hypertension     Kidney stone     Moderate persistent asthma with acute exacerbation 2020    Obesity     Sleep apnea     Trigger ring finger of right hand 12/15/2022    Type 2 diabetes mellitus without complication, without long-term current use of insulin (HCC) 2020    Vitamin D deficiency       Past Surgical History:   Procedure Laterality Date    COLONOSCOPY  2015    tiny polyp=next     COLONOSCOPY      CYST REMOVAL      thumb cyst    HYSTEROSCOPY  2008    with biopsy    MAMMO (HISTORICAL)  2016    normal=[facility]    OTHER SURGICAL HISTORY      tongue papiloma    ND TENDON SHEATH INCISION Right 2023    Procedure: RELEASE TRIGGER FINGER-right ring finger;  Surgeon: Nalini Conner;  Location:  MAIN OR;  Service: Orthopedics    TONSILLECTOMY      UPPER GASTROINTESTINAL ENDOSCOPY        Family History   Problem Relation Age of Onset    Obesity Mother         hx gastric bypass    Diabetes Mother     Hypertension Mother     Depression Mother          age 62    Arthritis Mother     Hyperlipidemia Mother     Heart attack Mother     Rheum arthritis Mother     COPD Mother     Heart disease Mother     Mental illness Mother     Obesity Father     Hypertension Father     Depression Father          age 82    Arthritis Father     Rheum arthritis Father     ADD / ADHD Father     Anxiety disorder Father     COPD Father     Heart disease Father     Mental illness Father     Obesity Brother     Depression Brother          age 52    Arthritis Brother     Cancer Brother 49        malignant neoplasm of lip, oral cavity, and pharynx    Drug abuse Brother     Substance Abuse Brother     Bipolar disorder Brother     Suicide Attempts Brother     Mental illness Brother     Breast cancer Maternal Aunt 30    Breast cancer Maternal Aunt 30     Breast cancer Maternal Aunt 65    Brain cancer Paternal Aunt     No Known Problems Paternal Aunt     No Known Problems Paternal Aunt     No Known Problems Paternal Aunt     No Known Problems Paternal Aunt      Social History     Tobacco Use    Smoking status: Never    Smokeless tobacco: Never    Tobacco comments:     Secondhand smoke growing up   Substance Use Topics    Alcohol use: Yes     Comment: Seldom     Allergies   Allergen Reactions    Bee Venom Anaphylaxis, Hives, Shortness Of Breath, Throat Swelling and Wheezing    Pneumococcal Vaccines Hives     Anaphylaxis as per pt    Other Rash and Sneezing     Beech, birch (rash), maple tree pollen. Dog and cat danger.          Current Outpatient Medications:     acetaminophen (TYLENOL) 325 mg tablet, Take 650 mg by mouth every 6 (six) hours as needed for mild pain, Disp: , Rfl:     Albuterol (PROVENTIL IN), Inhale, Disp: , Rfl:     atorvastatin (LIPITOR) 10 mg tablet, Take 1 tablet (10 mg total) by mouth daily, Disp: 90 tablet, Rfl: 1    b complex vitamins tablet, Take 1 tablet by mouth daily, Disp: , Rfl:     BORON PO, Take 1 Capful by mouth in the morning, Disp: , Rfl:     cholecalciferol (VITAMIN D3) 1,000 units tablet, Take 10,000 Units by mouth daily Only on Sundays, Disp: , Rfl:     EPINEPHrine (EPIPEN JR) 0.15 mg/0.3 mL SOAJ, Inject 0.3 mg into a muscle once, Disp: , Rfl:     escitalopram (LEXAPRO) 10 mg tablet, Take 1 tablet (10 mg total) by mouth daily, Disp: 90 tablet, Rfl: 0    fluticasone (FLONASE) 50 mcg/act nasal spray, 2 sprays into each nostril daily Per nare daily, Disp: , Rfl:     levalbuterol (Xopenex) 1.25 mg/3 mL nebulizer solution, Take 3 mL (1.25 mg total) by nebulization 3 (three) times a day (Patient not taking: Reported on 3/18/2024), Disp: 90 mL, Rfl: 3    levothyroxine (Levoxyl) 25 mcg tablet, Take 1 tablet (25 mcg total) by mouth daily in the early morning, Disp: 90 tablet, Rfl: 1    Loratadine 10 MG CAPS, Take by mouth daily after  breakfast, Disp: , Rfl:     metFORMIN (GLUCOPHAGE-XR) 500 mg 24 hr tablet, Take 2 tablets (1,000 mg total) by mouth daily with breakfast, Disp: 180 tablet, Rfl: 0    methylprednisolone (MEDROL) 4 mg tablet, Medrol dose pack Take as directed., Disp: 21 tablet, Rfl: 0    omeprazole (PriLOSEC) 40 MG capsule, Take 1 capsule (40 mg total) by mouth daily before breakfast, Disp: 90 capsule, Rfl: 1    oxybutynin (DITROPAN-XL) 10 MG 24 hr tablet, Take 1 tablet (10 mg total) by mouth daily at bedtime, Disp: 90 tablet, Rfl: 0    semaglutide, 0.25 or 0.5 mg/dose, (Ozempic, 0.25 or 0.5 MG/DOSE,) 2 mg/3 mL injection pen, Inject 0.75 mL (0.5 mg total) under the skin every 7 days for 30 days, THEN 0.75 mL (0.5 mg total) every 7 days., Disp: 9 mL, Rfl: 0  Review of Systems   Constitutional:  Negative for appetite change, fatigue and unexpected weight change.   HENT:  Negative for trouble swallowing and voice change.    Eyes:  Negative for visual disturbance.   Respiratory:  Negative for cough, shortness of breath and wheezing.    Cardiovascular:  Negative for palpitations and leg swelling.   Gastrointestinal:  Negative for abdominal pain, constipation, diarrhea, nausea and vomiting.   Endocrine: Negative for cold intolerance, heat intolerance, polyphagia and polyuria.   Musculoskeletal:  Negative for arthralgias.   Skin:  Negative for color change, rash and wound.   Neurological:  Negative for dizziness, tremors, weakness, light-headedness, numbness and headaches.   Psychiatric/Behavioral:  Negative for agitation and sleep disturbance. The patient is not nervous/anxious.        Physical Exam:  There is no height or weight on file to calculate BMI.  There were no vitals taken for this visit.   Wt Readings from Last 3 Encounters:   03/18/24 92.4 kg (203 lb 12.8 oz)   02/19/24 98 kg (216 lb)   02/12/24 104 kg (229 lb)       GEN: NAD  E/n/m nl facies,   Eyes: no stare or proptosis,   Ab+BS  Neuro: no tremor,   Skin: warm and dry,   Ext:  " no edema bilaterally,   Psych: nl mood and affect, no gross lapses in memory      Labs:   No components found for: \"HA1C\"  No components found for: \"GLU\"    Lab Results   Component Value Date    CREATININE 0.90 03/13/2024    CREATININE 0.97 12/18/2023    CREATININE 0.88 12/15/2022    BUN 16 03/13/2024    K 4.2 03/13/2024     03/13/2024    CO2 26 03/13/2024     eGFR   Date Value Ref Range Status   03/13/2024 70 ml/min/1.73sq m Final     No components found for: \"MALBCRER\"    Lab Results   Component Value Date    HDL 44 (L) 03/13/2024    TRIG 100 03/13/2024       Lab Results   Component Value Date    ALT 41 03/13/2024    AST 35 03/13/2024    ALKPHOS 55 03/13/2024       Lab Results   Component Value Date    FREET4 0.66 05/14/2024       Impression:  1. Type 2 diabetes mellitus without complication, without long-term current use of insulin ([facility])    2. Mixed hyperlipidemia    3. Class 3 severe obesity due to excess calories with serious comorbidity and body mass index (BMI) of 40.0 to 44.9 in adult ([facility])    4. Subclinical hypothyroidism           Plan:    Problem List Items Addressed This Visit          Endocrine    Type 2 diabetes mellitus without complication, without long-term current use of insulin ([facility]) - Primary       Lab Results   Component Value Date    HGBA1C 5.9 (H) 03/13/2024   Glycemic control has improved significantly with A1c of 5.9%, she was congratulated on her hard work managing her diabetes and she was encouraged to do so.  She reports dysgeusia on Ozempic, and she is willing to discontinue the medication, which was discontinued.  I agree to continue continue with metformin extended release 1000 mg daily and she was instructed to check her blood sugars once a day in different times and bring a log to next visit.  She is currently on [employer] diet program, which has helped her to decrease significant weight, she is willing to continue with current program.  Return back in 4 months for  Labs prior " to next visit.         Subclinical hypothyroidism     She is clinically and biochemically euthyroid, on levothyroxine 25 mcg once daily which will be maintained.  Thyroid antibody panel was negative for Hashimoto's thyroiditis.  Check TFT in 3 months.              Other    Class 3 severe obesity due to excess calories with serious comorbidity and body mass index (BMI) of 40.0 to 44.9 in adult (HCC)     She has lost significant weight since last visit, on current program as well as Ozempic she was encouraged to continue lifestyle modifications including regular daily exercise and balanced diet.  Ozempic was discontinued due to side effects.( Dysgeusia).  Return back in 4 months.         Mixed hyperlipidemia     LDL is 42, goal less than 70, she is willing to discontinue statin, she will hold taking Lipitor for now and we will repeat LDL in few months, if remains within goal, will continue monitoring without statin.                  Discussed with the patient and all questioned fully answered. She will call me if any problems arise.    Counseled patient on diagnostic results, prognosis, risk and benefit of treatment options, instruction for management, importance of treatment compliance, Risk  factor reduction and impressions      Jeff Henderson MD

## 2024-05-15 NOTE — ASSESSMENT & PLAN NOTE
She has lost significant weight since last visit, on current program as well as Ozempic she was encouraged to continue lifestyle modifications including regular daily exercise and balanced diet.  Ozempic was discontinued due to side effects.( Dysgeusia).  Return back in 4 months.

## 2024-05-15 NOTE — ASSESSMENT & PLAN NOTE
She is clinically and biochemically euthyroid, on levothyroxine 25 mcg once daily which will be maintained.  Thyroid antibody panel was negative for Hashimoto's thyroiditis.  Check TFT in 3 months.

## 2024-05-15 NOTE — ASSESSMENT & PLAN NOTE
LDL is 42, goal less than 70, she is willing to discontinue statin, she will hold taking Lipitor for now and we will repeat LDL in few months, if remains within goal, will continue monitoring without statin.

## 2024-05-15 NOTE — ASSESSMENT & PLAN NOTE
Lab Results   Component Value Date    HGBA1C 5.9 (H) 03/13/2024   Glycemic control has improved significantly with A1c of 5.9%, she was congratulated on her hard work managing her diabetes and she was encouraged to do so.  She reports dysgeusia on Ozempic, and she is willing to discontinue the medication, which was discontinued.  I agree to continue continue with metformin extended release 1000 mg daily and she was instructed to check her blood sugars once a day in different times and bring a log to next visit.  She is currently on [employer] diet program, which has helped her to decrease significant weight, she is willing to continue with current program.  Return back in 4 months for  Labs prior to next visit.

## 2024-06-14 DIAGNOSIS — R32 URINARY INCONTINENCE, UNSPECIFIED TYPE: ICD-10-CM

## 2024-06-14 RX ORDER — OXYBUTYNIN CHLORIDE 10 MG/1
10 TABLET, EXTENDED RELEASE ORAL
Qty: 90 TABLET | Refills: 0 | Status: SHIPPED | OUTPATIENT
Start: 2024-06-14 | End: 2024-09-12

## 2024-07-18 ENCOUNTER — APPOINTMENT (OUTPATIENT)
Dept: LAB | Facility: CLINIC | Age: 59
End: 2024-07-18
Payer: COMMERCIAL

## 2024-07-18 DIAGNOSIS — E03.8 OTHER SPECIFIED HYPOTHYROIDISM: ICD-10-CM

## 2024-07-18 DIAGNOSIS — E11.69 TYPE 2 DIABETES MELLITUS WITH OTHER SPECIFIED COMPLICATION, WITHOUT LONG-TERM CURRENT USE OF INSULIN (HCC): ICD-10-CM

## 2024-07-18 LAB
ALBUMIN SERPL BCG-MCNC: 3.9 G/DL (ref 3.5–5)
ALP SERPL-CCNC: 48 U/L (ref 34–104)
ALT SERPL W P-5'-P-CCNC: 19 U/L (ref 7–52)
ANION GAP SERPL CALCULATED.3IONS-SCNC: 10 MMOL/L (ref 4–13)
AST SERPL W P-5'-P-CCNC: 21 U/L (ref 13–39)
BASOPHILS # BLD AUTO: 0.05 THOUSANDS/ÂΜL (ref 0–0.1)
BASOPHILS NFR BLD AUTO: 1 % (ref 0–1)
BILIRUB SERPL-MCNC: 0.48 MG/DL (ref 0.2–1)
BILIRUB UR QL STRIP: NEGATIVE
BUN SERPL-MCNC: 19 MG/DL (ref 5–25)
CALCIUM SERPL-MCNC: 9.4 MG/DL (ref 8.4–10.2)
CHLORIDE SERPL-SCNC: 106 MMOL/L (ref 96–108)
CHOLEST SERPL-MCNC: 178 MG/DL
CLARITY UR: CLEAR
CO2 SERPL-SCNC: 23 MMOL/L (ref 21–32)
COLOR UR: COLORLESS
CREAT SERPL-MCNC: 0.86 MG/DL (ref 0.6–1.3)
CREAT UR-MCNC: 57.4 MG/DL
EOSINOPHIL # BLD AUTO: 0.22 THOUSAND/ÂΜL (ref 0–0.61)
EOSINOPHIL NFR BLD AUTO: 3 % (ref 0–6)
ERYTHROCYTE [DISTWIDTH] IN BLOOD BY AUTOMATED COUNT: 12.1 % (ref 11.6–15.1)
EST. AVERAGE GLUCOSE BLD GHB EST-MCNC: 111 MG/DL
GFR SERPL CREATININE-BSD FRML MDRD: 74 ML/MIN/1.73SQ M
GLUCOSE P FAST SERPL-MCNC: 90 MG/DL (ref 65–99)
GLUCOSE UR STRIP-MCNC: NEGATIVE MG/DL
HBA1C MFR BLD: 5.5 %
HCT VFR BLD AUTO: 36.6 % (ref 34.8–46.1)
HDLC SERPL-MCNC: 52 MG/DL
HGB BLD-MCNC: 12.4 G/DL (ref 11.5–15.4)
HGB UR QL STRIP.AUTO: NEGATIVE
IMM GRANULOCYTES # BLD AUTO: 0.02 THOUSAND/UL (ref 0–0.2)
IMM GRANULOCYTES NFR BLD AUTO: 0 % (ref 0–2)
KETONES UR STRIP-MCNC: NEGATIVE MG/DL
LDLC SERPL CALC-MCNC: 104 MG/DL (ref 0–100)
LEUKOCYTE ESTERASE UR QL STRIP: NEGATIVE
LYMPHOCYTES # BLD AUTO: 2.14 THOUSANDS/ÂΜL (ref 0.6–4.47)
LYMPHOCYTES NFR BLD AUTO: 32 % (ref 14–44)
MCH RBC QN AUTO: 32.1 PG (ref 26.8–34.3)
MCHC RBC AUTO-ENTMCNC: 33.9 G/DL (ref 31.4–37.4)
MCV RBC AUTO: 95 FL (ref 82–98)
MICROALBUMIN UR-MCNC: <7 MG/L
MONOCYTES # BLD AUTO: 0.42 THOUSAND/ÂΜL (ref 0.17–1.22)
MONOCYTES NFR BLD AUTO: 6 % (ref 4–12)
NEUTROPHILS # BLD AUTO: 3.77 THOUSANDS/ÂΜL (ref 1.85–7.62)
NEUTS SEG NFR BLD AUTO: 58 % (ref 43–75)
NITRITE UR QL STRIP: NEGATIVE
NRBC BLD AUTO-RTO: 0 /100 WBCS
PH UR STRIP.AUTO: 6.5 [PH]
PLATELET # BLD AUTO: 281 THOUSANDS/UL (ref 149–390)
PMV BLD AUTO: 11.4 FL (ref 8.9–12.7)
POTASSIUM SERPL-SCNC: 4.2 MMOL/L (ref 3.5–5.3)
PROT SERPL-MCNC: 6.7 G/DL (ref 6.4–8.4)
PROT UR STRIP-MCNC: NEGATIVE MG/DL
RBC # BLD AUTO: 3.86 MILLION/UL (ref 3.81–5.12)
SODIUM SERPL-SCNC: 139 MMOL/L (ref 135–147)
SP GR UR STRIP.AUTO: 1.01 (ref 1–1.03)
TRIGL SERPL-MCNC: 109 MG/DL
TSH SERPL DL<=0.05 MIU/L-ACNC: 2.32 UIU/ML (ref 0.45–4.5)
UROBILINOGEN UR STRIP-ACNC: <2 MG/DL
WBC # BLD AUTO: 6.62 THOUSAND/UL (ref 4.31–10.16)

## 2024-07-18 PROCEDURE — 84443 ASSAY THYROID STIM HORMONE: CPT

## 2024-07-18 PROCEDURE — 83036 HEMOGLOBIN GLYCOSYLATED A1C: CPT

## 2024-07-18 PROCEDURE — 82043 UR ALBUMIN QUANTITATIVE: CPT

## 2024-07-18 PROCEDURE — 85025 COMPLETE CBC W/AUTO DIFF WBC: CPT

## 2024-07-18 PROCEDURE — 80061 LIPID PANEL: CPT

## 2024-07-18 PROCEDURE — 36415 COLL VENOUS BLD VENIPUNCTURE: CPT

## 2024-07-18 PROCEDURE — 82570 ASSAY OF URINE CREATININE: CPT

## 2024-07-18 PROCEDURE — 80053 COMPREHEN METABOLIC PANEL: CPT

## 2024-07-24 ENCOUNTER — RA CDI HCC (OUTPATIENT)
Dept: OTHER | Facility: HOSPITAL | Age: 59
End: 2024-07-24

## 2024-07-24 ENCOUNTER — TELEPHONE (OUTPATIENT)
Dept: FAMILY MEDICINE CLINIC | Facility: CLINIC | Age: 59
End: 2024-07-24

## 2024-07-24 NOTE — TELEPHONE ENCOUNTER
Called pt -- lvm informing of PCP message re labs WNL. Advised call back if there are further questions or concerns.

## 2024-07-24 NOTE — TELEPHONE ENCOUNTER
----- Message from Eufemia Falk MD sent at 7/19/2024  6:53 PM EDT -----  Labs  well in limits  great improvement  ----- Message -----  From: Lab, Background User  Sent: 7/18/2024   4:45 PM EDT  To: Eufemia Falk MD

## 2024-07-29 ENCOUNTER — OFFICE VISIT (OUTPATIENT)
Dept: FAMILY MEDICINE CLINIC | Facility: CLINIC | Age: 59
End: 2024-07-29
Payer: COMMERCIAL

## 2024-07-29 VITALS
OXYGEN SATURATION: 98 % | HEART RATE: 60 BPM | HEIGHT: 63 IN | WEIGHT: 186.6 LBS | BODY MASS INDEX: 33.06 KG/M2 | SYSTOLIC BLOOD PRESSURE: 116 MMHG | TEMPERATURE: 98 F | DIASTOLIC BLOOD PRESSURE: 74 MMHG

## 2024-07-29 DIAGNOSIS — F41.9 ANXIETY: ICD-10-CM

## 2024-07-29 DIAGNOSIS — E78.2 MIXED HYPERLIPIDEMIA: ICD-10-CM

## 2024-07-29 DIAGNOSIS — E11.69 TYPE 2 DIABETES MELLITUS WITH OTHER SPECIFIED COMPLICATION, WITHOUT LONG-TERM CURRENT USE OF INSULIN (HCC): Primary | ICD-10-CM

## 2024-07-29 DIAGNOSIS — R39.81 FUNCTIONAL URINARY INCONTINENCE: ICD-10-CM

## 2024-07-29 DIAGNOSIS — E03.9 HYPOTHYROIDISM, UNSPECIFIED TYPE: ICD-10-CM

## 2024-07-29 PROCEDURE — 3078F DIAST BP <80 MM HG: CPT | Performed by: FAMILY MEDICINE

## 2024-07-29 PROCEDURE — 3725F SCREEN DEPRESSION PERFORMED: CPT | Performed by: FAMILY MEDICINE

## 2024-07-29 PROCEDURE — 99214 OFFICE O/P EST MOD 30 MIN: CPT | Performed by: FAMILY MEDICINE

## 2024-07-29 PROCEDURE — 3074F SYST BP LT 130 MM HG: CPT | Performed by: FAMILY MEDICINE

## 2024-07-29 NOTE — ASSESSMENT & PLAN NOTE
Lab Results   Component Value Date    HGBA1C 5.5 07/18/2024   With her  50 lbs  weight loss  with  diet  and  exercise   her  A1c is 5.5  doing great  she  is only  taking  metformin 500 mg  daily now   asked  her to keep  alog  will  f/u

## 2024-07-29 NOTE — PROGRESS NOTES
Assessment/Plan:as  below         Problem List Items Addressed This Visit          Endocrine    Type 2 diabetes mellitus with other specified complication, without long-term current use of insulin (HCC) - Primary       Lab Results   Component Value Date    HGBA1C 5.5 07/18/2024   With her  50 lbs  weight loss  with  diet  and  exercise   her  A1c is 5.5  doing great  she  is only  taking  metformin 500 mg  daily now   asked  her to keep  alog  will  f/u         Relevant Orders    CBC and differential    Comprehensive metabolic panel    Lipid panel    Hemoglobin A1C    Albumin / creatinine urine ratio    Comprehensive metabolic panel       Urinary    Functional urinary incontinence     Much improved  now            Behavioral Health    Anxiety     Much  stable  now  no  complaints is on  lexapro            Other    Mixed hyperlipidemia     Her  labs  reviewed  and  discussed   stable          Other Visit Diagnoses       Hypothyroidism, unspecified type        Relevant Orders    TSH + Free T4              Subjective:      Patient ID: Jing Ortiz is a 59 y.o. female.    HPI-  came  in  to  follow up on her  dm2  htn hyperlipidemia   much  improved    her  A1c is 5.5   with her  weight loss of 50 lbs  with  diet and  exercise    no  anxiety  at all no  depression  she  feels  great   her  urinary  continence  improved   djd  improved   feels  great    The following portions of the patient's history were reviewed and updated as appropriate:   Past Medical History:  She has a past medical history of Anxiety (09/16/2020), Arthritis, Asthma, Bile duct stricture, Class 2 obesity due to excess calories in adult (02/24/2021), Contusion of ribs, right, initial encounter (09/22/2021), CPAP (continuous positive airway pressure) dependence, Diabetes mellitus (HCC), Fatty liver, Functional urinary incontinence (09/14/2020), HPV (human papilloma virus) infection, Hyperlipidemia, Hypertension, Kidney stone, Moderate persistent  asthma with acute exacerbation (09/16/2020), Obesity, Sleep apnea, Trigger ring finger of right hand (12/15/2022), Type 2 diabetes mellitus without complication, without long-term current use of insulin (HCC) (09/16/2020), and Vitamin D deficiency.,  _______________________________________________________________________  Medical Problems:  does not have any pertinent problems on file.,  _______________________________________________________________________  Past Surgical History:   has a past surgical history that includes Tonsillectomy; Colonoscopy (01/01/2015); Hysteroscopy (01/01/2008); Other surgical history (1998); Cyst Removal; Mammo (historical) (01/04/2016); pr tendon sheath incision (Right, 02/06/2023); Upper gastrointestinal endoscopy; and Colonoscopy.,  _______________________________________________________________________  Family History:  family history includes ADD / ADHD in her father; Anxiety disorder in her father; Arthritis in her brother, father, and mother; Bipolar disorder in her brother; Brain cancer in her paternal aunt; Breast cancer (age of onset: 30) in her maternal aunt and maternal aunt; Breast cancer (age of onset: 65) in her maternal aunt; COPD in her father and mother; Cancer (age of onset: 49) in her brother; Depression in her brother, father, and mother; Diabetes in her mother; Drug abuse in her brother; Heart attack in her mother; Heart disease in her father and mother; Hyperlipidemia in her mother; Hypertension in her father and mother; Mental illness in her brother, father, and mother; No Known Problems in her paternal aunt, paternal aunt, paternal aunt, and paternal aunt; Obesity in her brother, father, and mother; Rheum arthritis in her father and mother; Substance Abuse in her brother; Suicide Attempts in her brother.,  _______________________________________________________________________  Social History:   reports that she has never smoked. She has never used smokeless  tobacco. She reports current alcohol use. She reports that she does not use drugs.,  _______________________________________________________________________  Allergies:  is allergic to bee venom, pneumococcal vaccines, and other..  _______________________________________________________________________  Current Outpatient Medications   Medication Sig Dispense Refill    acetaminophen (TYLENOL) 325 mg tablet Take 650 mg by mouth every 6 (six) hours as needed for mild pain      Albuterol (PROVENTIL IN) Inhale      b complex vitamins tablet Take 1 tablet by mouth daily      BORON PO Take 1 Capful by mouth in the morning      cholecalciferol (VITAMIN D3) 1,000 units tablet Take 10,000 Units by mouth daily Only on Sundays      EPINEPHrine (EPIPEN JR) 0.15 mg/0.3 mL SOAJ Inject 0.3 mg into a muscle once      escitalopram (LEXAPRO) 10 mg tablet Take 1 tablet (10 mg total) by mouth daily 90 tablet 0    fluticasone (FLONASE) 50 mcg/act nasal spray 2 sprays into each nostril daily Per nare daily      levothyroxine (Levoxyl) 25 mcg tablet Take 1 tablet (25 mcg total) by mouth daily in the early morning 90 tablet 1    Loratadine 10 MG CAPS Take by mouth daily after breakfast      metFORMIN (GLUCOPHAGE-XR) 500 mg 24 hr tablet Take 2 tablets (1,000 mg total) by mouth daily with breakfast 180 tablet 0    oxybutynin (DITROPAN-XL) 10 MG 24 hr tablet Take 1 tablet (10 mg total) by mouth daily at bedtime 90 tablet 0    atorvastatin (LIPITOR) 10 mg tablet Take 1 tablet (10 mg total) by mouth daily 90 tablet 1    levalbuterol (Xopenex) 1.25 mg/3 mL nebulizer solution Take 3 mL (1.25 mg total) by nebulization 3 (three) times a day (Patient not taking: Reported on 3/18/2024) 90 mL 3    methylprednisolone (MEDROL) 4 mg tablet Medrol dose pack Take as directed. 21 tablet 0    omeprazole (PriLOSEC) 40 MG capsule Take 1 capsule (40 mg total) by mouth daily before breakfast 90 capsule 1     No current facility-administered medications for this  "visit.     _______________________________________________________________________  Review of Systems   Constitutional:  Negative for fatigue and fever.   HENT:  Negative for congestion and postnasal drip.    Eyes:  Negative for pain, discharge and itching.   Respiratory:  Negative for cough and shortness of breath.    Cardiovascular:  Negative for chest pain, palpitations and leg swelling.        Htn hyperlipidemia   Gastrointestinal:  Negative for abdominal distention and abdominal pain.   Endocrine: Negative for cold intolerance, heat intolerance and polydipsia.        Dm2   Genitourinary:  Negative for dysuria, flank pain and urgency.   Musculoskeletal:  Negative for arthralgias and back pain.   Neurological:  Negative for dizziness and headaches.   Psychiatric/Behavioral:  Negative for sleep disturbance. The patient is not nervous/anxious.          Objective:  Vitals:    07/29/24 1808   BP: 116/74   BP Location: Left arm   Patient Position: Sitting   Cuff Size: Standard   Pulse: 60   Temp: 98 °F (36.7 °C)   TempSrc: Temporal   SpO2: 98%   Weight: 84.6 kg (186 lb 9.6 oz)   Height: 5' 3\" (1.6 m)     Body mass index is 33.05 kg/m².     Physical Exam  Vitals and nursing note reviewed.   Constitutional:       General: She is not in acute distress.     Appearance: Normal appearance. She is not ill-appearing, toxic-appearing or diaphoretic.   HENT:      Head: Normocephalic.      Nose: Nose normal. No congestion.      Mouth/Throat:      Mouth: Mucous membranes are moist.      Pharynx: Oropharynx is clear. No oropharyngeal exudate.   Eyes:      Extraocular Movements: Extraocular movements intact.      Conjunctiva/sclera: Conjunctivae normal.      Pupils: Pupils are equal, round, and reactive to light.   Cardiovascular:      Rate and Rhythm: Normal rate and regular rhythm.      Pulses: Normal pulses.      Heart sounds: Normal heart sounds. No murmur heard.     No gallop.   Pulmonary:      Effort: Pulmonary effort is " normal.      Breath sounds: Normal breath sounds. No wheezing, rhonchi or rales.   Abdominal:      General: There is no distension.      Palpations: Abdomen is soft. There is no mass.      Tenderness: There is no abdominal tenderness. There is no guarding or rebound.   Musculoskeletal:      Cervical back: Normal range of motion and neck supple. No rigidity or tenderness.      Right lower leg: No edema.      Left lower leg: No edema.   Lymphadenopathy:      Cervical: No cervical adenopathy.   Skin:     Findings: No erythema or rash.   Neurological:      General: No focal deficit present.      Mental Status: She is alert and oriented to person, place, and time.   Psychiatric:         Mood and Affect: Mood normal.         Behavior: Behavior normal.         Thought Content: Thought content normal.

## 2024-08-13 ENCOUNTER — OFFICE VISIT (OUTPATIENT)
Dept: GASTROENTEROLOGY | Facility: CLINIC | Age: 59
End: 2024-08-13
Payer: COMMERCIAL

## 2024-08-13 VITALS
TEMPERATURE: 98.2 F | SYSTOLIC BLOOD PRESSURE: 110 MMHG | WEIGHT: 192.4 LBS | DIASTOLIC BLOOD PRESSURE: 68 MMHG | HEART RATE: 79 BPM | BODY MASS INDEX: 34.09 KG/M2 | HEIGHT: 63 IN | OXYGEN SATURATION: 97 %

## 2024-08-13 DIAGNOSIS — Z12.11 SCREENING FOR COLON CANCER: Primary | ICD-10-CM

## 2024-08-13 DIAGNOSIS — R74.8 ELEVATED LIVER ENZYMES: ICD-10-CM

## 2024-08-13 DIAGNOSIS — R11.2 NAUSEA AND VOMITING, UNSPECIFIED VOMITING TYPE: ICD-10-CM

## 2024-08-13 DIAGNOSIS — K76.0 METABOLIC DYSFUNCTION-ASSOCIATED STEATOTIC LIVER DISEASE (MASLD): ICD-10-CM

## 2024-08-13 DIAGNOSIS — R10.13 EPIGASTRIC ABDOMINAL PAIN: ICD-10-CM

## 2024-08-13 PROCEDURE — 99213 OFFICE O/P EST LOW 20 MIN: CPT | Performed by: FAMILY MEDICINE

## 2024-08-13 NOTE — PROGRESS NOTES
[facility] - Outpatient Follow-up Note  Jing Ortiz 59 y.o. female MRN: [id number]  Encounter: [id number]          ASSESSMENT AND PLAN:      1. Metabolic dysfunction-associated steatotic liver disease (MASLD)  2. Elevated liver enzymes (resolved)  Patient with mildly elevated serum transaminases since December 2022 incidentally noted to have hepatomegaly and hepatic steatosis on CT chest. 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 She also had a US elastography which was notable for F0-F1 fibrosis and S3 steatosis.    Fortunately, she has lost nearly 40 lbs since her initial consultation with the use of the Optavia diet program and cycling. As a result, she has had complete normalization of her serum transaminases and her hemoglobin A1c (5.5%). Congratulated her on her efforts and strongly encouraged her to continue with these lifestyle modifications!    She therefore may return to the care of her primary care provider. It is recommended that she continue to have her liver tests checked with annual routine labs and could consider a repeat abdominal ultrasound 2-3 years from her last to determine if there is still presence of hepatic steatosis. It is unlikely she will require a repeat US elastography so long as she does not have interval weight gain and her serum transaminases remain normal.    3. Epigastric abdominal pain (resolved)  4. Nausea and vomiting, unspecified vomiting type (resolved)  Patient previously endorsed intermittent epigastric abdominal pain and occasional nausea with vomiting thought to be secondary to silent GERD vs biliary dyskinesia. She had an EGD (11/2023) notable for mild edematous and erythematous mucosa in the body of the stomach, incisura and antrum. Biopsies were negative for H. pylori. She was taking omeprazole 40 mg once daily and, with weight loss,  had complete resolution of her symptoms.     She is no longer taking omeprazole and continues to deny epigastric abdominal pain or nausea/vomiting.  Congratulated her on her efforts with weight loss and discussed how this likely contributed. She will inform us if she were to have any recurrent or additional GI related symptoms.    5. Screening for colon cancer  Patient is overdue for CRC screening. She had a Cologuard in July 2021 which was negative. No personal history of colonic polyps, family history of colon cancer or additional alarm features. Ordered her for repeat Cologuard for CRC screening.     - Cologuard    Follow-up PRN.     ______________________________________________________________________    SUBJECTIVE: Patient is a 59 y.o. female  with PMH significant for anxiety, asthma, DM2, NICKY on CPAP and HLD who presents today for follow-up regarding MASLD.     Interval history  - She has lost nearly 40 lbs since our initial consultation.   - Most recent labs (7/18) shows normalization of her liver tests and hemoglobin A1c (5.5%).  - She is off of omeprazole without recurrent reflux. Denies further epigastric pain.     Extended liver history  Jing was noted to have a mildly elevated serum ALT on routine serologies in December 2022. Per chart review, she was seen to have hepatomegaly and hepatic steatosis on a prior CT chest which did partially visualize the liver. Serologies without thrombocytopenia or hypoalbuminemia. Her metabolic risk factors include obesity with a BMI is 39.79 as well as DM2 and hyperlipidemia. Admits to rare EtOH use.     Denies a personal history of chronic liver disease. Denies a family history of liver disease or liver-related cancers. Denies any known past or current infection with viral hepatitis. Denies being treated for any autoimmune conditions. Denies taking any herbal supplements, performance-enhancing drugs, or OTC/prescription medications not reflected on their med  list.    She started taking Lipitor and Januvia 2 weeks prior but her abnormal liver function tests predated this.      Denies any liver specific complaints but does report epigastric abdominal pain 2-3x weekly. She states that this typically occurs 4-5 hours after eating and will either resolve on her own or result in her throwing up. Denies this being related with overt heartburn. She does report a diagnosis of biliary dyskinesia for which she has not had her gallbladder removed. Denies dysphagia/odynophagia, early satiety, other abdominal pain, overt GI bleeding or unintentional weight loss.    2/19/20924 - She was using Optiva for weight loss and was also started on Ozempic by endocrinology. EGD (11/2023) notable for mild edematous and erythematous mucosa in the body of the stomach, incisura and antrum. Took omeprazole x1 month with resolution of her epigastric abdominal pain.       REVIEW OF SYSTEMS IS OTHERWISE NEGATIVE.      Historical Information   Past Medical History:   Diagnosis Date   • Anxiety 09/16/2020   • Arthritis    • Asthma    • Bile duct stricture    • Class 2 obesity due to excess calories in adult 02/24/2021   • Contusion of ribs, right, initial encounter 09/22/2021   • CPAP (continuous positive airway pressure) dependence    • Diabetes mellitus (HCC)    • Fatty liver    • Functional urinary incontinence 09/14/2020   • HPV (human papilloma virus) infection    • Hyperlipidemia    • Hypertension    • Kidney stone    • Moderate persistent asthma with acute exacerbation 09/16/2020   • Obesity    • Sleep apnea    • Trigger ring finger of right hand 12/15/2022   • Type 2 diabetes mellitus without complication, without long-term current use of insulin (HCC) 09/16/2020   • Vitamin D deficiency      Past Surgical History:   Procedure Laterality Date   • COLONOSCOPY  01/01/2015    tiny polyp=next 2020   • COLONOSCOPY     • CYST REMOVAL      thumb cyst   • HYSTEROSCOPY  01/01/2008    with biopsy   • MAMMO  (HISTORICAL)  2016    [facility]   • OTHER SURGICAL HISTORY      tongue papiloma   • IL TENDON SHEATH INCISION Right 2023    Procedure: RELEASE TRIGGER FINGER-right ring finger;  Surgeon: Nalini Conner;  Location:  MAIN OR;  Service: Orthopedics   • TONSILLECTOMY     • UPPER GASTROINTESTINAL ENDOSCOPY       Social History   Social History     Substance and Sexual Activity   Alcohol Use Yes    Comment: Seldom     Social History     Substance and Sexual Activity   Drug Use Never     Social History     Tobacco Use   Smoking Status Never   Smokeless Tobacco Never   Tobacco Comments    Secondhand smoke growing up     Family History   Problem Relation Age of Onset   • Obesity Mother         hx gastric bypass   • Diabetes Mother    • Hypertension Mother    • Depression Mother          age 62   • Arthritis Mother    • Hyperlipidemia Mother    • Heart attack Mother    • Rheum arthritis Mother    • COPD Mother    • Heart disease Mother    • Mental illness Mother    • Obesity Father    • Hypertension Father    • Depression Father          age 82   • Arthritis Father    • Rheum arthritis Father    • ADD / ADHD Father    • Anxiety disorder Father    • COPD Father    • Heart disease Father    • Mental illness Father    • Obesity Brother    • Depression Brother          age 52   • Arthritis Brother    • Cancer Brother 49        malignant neoplasm of lip, oral cavity, and pharynx   • Drug abuse Brother    • Substance Abuse Brother    • Bipolar disorder Brother    • Suicide Attempts Brother    • Mental illness Brother    • Breast cancer Maternal Aunt 30   • Breast cancer Maternal Aunt 30   • Breast cancer Maternal Aunt 65   • Brain cancer Paternal Aunt    • No Known Problems Paternal Aunt    • No Known Problems Paternal Aunt    • No Known Problems Paternal Aunt    • No Known Problems Paternal Aunt        Meds/Allergies       Current Outpatient Medications:   •  acetaminophen  "(TYLENOL) 325 mg tablet  •  b complex vitamins tablet  •  BORON PO  •  cholecalciferol (VITAMIN D3) 1,000 units tablet  •  EPINEPHrine (EPIPEN JR) 0.15 mg/0.3 mL SOAJ  •  fluticasone (FLONASE) 50 mcg/act nasal spray  •  Loratadine 10 MG CAPS  •  metFORMIN (GLUCOPHAGE-XR) 500 mg 24 hr tablet  •  oxybutynin (DITROPAN-XL) 10 MG 24 hr tablet  •  Albuterol (PROVENTIL IN)  •  atorvastatin (LIPITOR) 10 mg tablet  •  escitalopram (LEXAPRO) 10 mg tablet  •  levalbuterol (Xopenex) 1.25 mg/3 mL nebulizer solution  •  levothyroxine (Levoxyl) 25 mcg tablet  •  methylprednisolone (MEDROL) 4 mg tablet  •  omeprazole (PriLOSEC) 40 MG capsule    Allergies   Allergen Reactions   • Bee Venom Anaphylaxis, Hives, Shortness Of Breath, Throat Swelling and Wheezing   • Pneumococcal Vaccines Hives     Anaphylaxis as per pt   • Other Rash and Sneezing     Beech, birch (rash), maple tree pollen. Dog and cat danger.            Objective     Blood pressure 110/68, pulse 79, temperature 98.2 °F (36.8 °C), height 5' 3\" (1.6 m), weight 87.3 kg (192 lb 6.4 oz), SpO2 97%. Body mass index is 34.08 kg/m².      PHYSICAL EXAM:      General Appearance:   Alert, cooperative, no distress   HEENT:   Normocephalic, atraumatic, anicteric.     Neck:  Supple, symmetrical, trachea midline   Lungs:   Clear to auscultation bilaterally; no rales, rhonchi or wheezing; respirations unlabored    Heart::   Regular rate and rhythm; no murmur, rub, or gallop.   Abdomen:   Soft, non-tender, non-distended; normal bowel sounds; no masses, no organomegaly    Genitalia:   Deferred    Rectal:   Deferred    Extremities:  No cyanosis, clubbing or edema    Pulses:  2+ and symmetric    Skin:  No jaundice, rashes, or lesions    Lymph nodes:  No palpable cervical lymphadenopathy        Lab Results:   No visits with results within 1 Day(s) from this visit.   Latest known visit with results is:   Appointment on 07/18/2024   Component Date Value   • Creatinine, Ur 07/18/2024 57.4    • " Albumin,U,Random 07/18/2024 <7.0    • Albumin Creat Ratio 07/18/2024     • Sodium 07/18/2024 139    • Potassium 07/18/2024 4.2    • Chloride 07/18/2024 106    • CO2 07/18/2024 23    • ANION GAP 07/18/2024 10    • BUN 07/18/2024 19    • Creatinine 07/18/2024 0.86    • Glucose, Fasting 07/18/2024 90    • Calcium 07/18/2024 9.4    • AST 07/18/2024 21    • ALT 07/18/2024 19    • Alkaline Phosphatase 07/18/2024 48    • Total Protein 07/18/2024 6.7    • Albumin 07/18/2024 3.9    • Total Bilirubin 07/18/2024 0.48    • eGFR 07/18/2024 74    • Hemoglobin A1C 07/18/2024 5.5    • EAG 07/18/2024 111    • WBC 07/18/2024 6.62    • RBC 07/18/2024 3.86    • Hemoglobin 07/18/2024 12.4    • Hematocrit 07/18/2024 36.6    • MCV 07/18/2024 95    • MCH 07/18/2024 32.1    • MCHC 07/18/2024 33.9    • RDW 07/18/2024 12.1    • MPV 07/18/2024 11.4    • Platelets 07/18/2024 281    • nRBC 07/18/2024 0    • Segmented % 07/18/2024 58    • Immature Grans % 07/18/2024 0    • Lymphocytes % 07/18/2024 32    • Monocytes % 07/18/2024 6    • Eosinophils Relative 07/18/2024 3    • Basophils Relative 07/18/2024 1    • Absolute Neutrophils 07/18/2024 3.77    • Absolute Immature Grans 07/18/2024 0.02    • Absolute Lymphocytes 07/18/2024 2.14    • Absolute Monocytes 07/18/2024 0.42    • Eosinophils Absolute 07/18/2024 0.22    • Basophils Absolute 07/18/2024 0.05    • Cholesterol 07/18/2024 178    • Triglycerides 07/18/2024 109    • HDL, Direct 07/18/2024 52    • LDL Calculated 07/18/2024 104 (H)    • TSH 3RD GENERATON 07/18/2024 2.321          Radiology Results:   No results found.    Lauren Dove PA-C     **Please note: Dictation voice to text software may have been used in the creation of this record. Occasional wrong word or “sound alike” substitutions may have occurred due to the inherent limitations of voice recognition software. Read the chart carefully and recognize, using context, where substitutions have occurred.**

## 2024-08-23 LAB — COLOGUARD RESULT REPORTABLE: NEGATIVE

## 2024-09-18 ENCOUNTER — OFFICE VISIT (OUTPATIENT)
Dept: ENDOCRINOLOGY | Facility: CLINIC | Age: 59
End: 2024-09-18
Payer: COMMERCIAL

## 2024-09-18 VITALS
SYSTOLIC BLOOD PRESSURE: 118 MMHG | OXYGEN SATURATION: 99 % | WEIGHT: 198.2 LBS | DIASTOLIC BLOOD PRESSURE: 64 MMHG | BODY MASS INDEX: 35.12 KG/M2 | HEIGHT: 63 IN | TEMPERATURE: 98.1 F | HEART RATE: 57 BPM

## 2024-09-18 DIAGNOSIS — R32 URINARY INCONTINENCE, UNSPECIFIED TYPE: ICD-10-CM

## 2024-09-18 DIAGNOSIS — E66.01 CLASS 3 SEVERE OBESITY DUE TO EXCESS CALORIES WITH SERIOUS COMORBIDITY AND BODY MASS INDEX (BMI) OF 40.0 TO 44.9 IN ADULT (HCC): Primary | ICD-10-CM

## 2024-09-18 DIAGNOSIS — E11.9 TYPE 2 DIABETES MELLITUS WITHOUT COMPLICATION, WITHOUT LONG-TERM CURRENT USE OF INSULIN (HCC): ICD-10-CM

## 2024-09-18 DIAGNOSIS — E03.8 SUBCLINICAL HYPOTHYROIDISM: ICD-10-CM

## 2024-09-18 DIAGNOSIS — E03.8 OTHER SPECIFIED HYPOTHYROIDISM: ICD-10-CM

## 2024-09-18 DIAGNOSIS — E78.2 MIXED HYPERLIPIDEMIA: ICD-10-CM

## 2024-09-18 PROCEDURE — 99214 OFFICE O/P EST MOD 30 MIN: CPT | Performed by: STUDENT IN AN ORGANIZED HEALTH CARE EDUCATION/TRAINING PROGRAM

## 2024-09-18 RX ORDER — METFORMIN HCL 500 MG
500 TABLET, EXTENDED RELEASE 24 HR ORAL
COMMUNITY
End: 2024-09-18

## 2024-09-18 RX ORDER — LEVOTHYROXINE SODIUM 25 UG/1
25 TABLET ORAL
Qty: 90 TABLET | Refills: 1 | Status: SHIPPED | OUTPATIENT
Start: 2024-09-18

## 2024-09-18 RX ORDER — METFORMIN HCL 500 MG
500 TABLET, EXTENDED RELEASE 24 HR ORAL
Qty: 90 TABLET | Refills: 0 | Status: SHIPPED | OUTPATIENT
Start: 2024-09-18 | End: 2024-12-17

## 2024-09-18 RX ORDER — OXYBUTYNIN CHLORIDE 10 MG/1
10 TABLET, EXTENDED RELEASE ORAL
Qty: 90 TABLET | Refills: 1 | Status: SHIPPED | OUTPATIENT
Start: 2024-09-18 | End: 2025-03-17

## 2024-09-18 NOTE — ASSESSMENT & PLAN NOTE
Lab Results   Component Value Date    HGBA1C 5.5 07/18/2024     Diabetes is very well-controlled, on low-dose metformin, 500 mg daily, which will be maintained.  She was encouraged to continue her efforts managing her diabetes.  Check blood sugars once a day at different times and bring sugar log to next visit.  Ophthalmology and podiatry follow-up  Return back in 6 months  Lab prior to next visit.    Orders:    metFORMIN (GLUCOPHAGE-XR) 500 mg 24 hr tablet; Take 1 tablet (500 mg total) by mouth daily with breakfast Once a day    Hemoglobin A1C; Future    Comprehensive metabolic panel; Future    Lipid Panel with Direct LDL reflex; Future

## 2024-09-18 NOTE — PROGRESS NOTES
Ambulatory Visit  Name: Jing Ortiz      : 1965      MRN: [id number]  Encounter Provider: Jeff Henderson MD  Encounter Date: 2024   Encounter department: [facility]    Assessment & Plan  Other specified hypothyroidism    Orders:    levothyroxine (Levoxyl) 25 mcg tablet; Take 1 tablet (25 mcg total) by mouth daily in the early morning    T4, free; Future    TSH, 3rd generation; Future    Type 2 diabetes mellitus without complication, without long-term current use of insulin ([facility])    Lab Results   Component Value Date    HGBA1C 5.5 2024     Diabetes is very well-controlled, on low-dose metformin, 500 mg daily, which will be maintained.  She was encouraged to continue her efforts managing her diabetes.  Check blood sugars once a day at different times and bring sugar log to next visit.  Ophthalmology and podiatry follow-up  Return back in 6 months  Lab prior to next visit.    Orders:    metFORMIN (GLUCOPHAGE-XR) 500 mg 24 hr tablet; Take 1 tablet (500 mg total) by mouth daily with breakfast Once a day    Hemoglobin A1C; Future    Comprehensive metabolic panel; Future    Lipid Panel with Direct LDL reflex; Future    Class 3 severe obesity due to excess calories with serious comorbidity and body mass index (BMI) of 40.0 to 44.9 in adult ([facility])  She continues to lose weight on her current diet program (Optavia).  Could not tolerate GLP-1 agonist.  She is willing to try a GLP-1-GIP dual agonist if she is not able to achieve her weight goal.           Mixed hyperlipidemia  Currently not on a statin.  Check lipid profile before next visit.           Subclinical hypothyroidism  She is clinically and biochemically thyroid.  Continue levothyroxine at current dose.  TSH before next visit.           History of Present Illness     Jing Ortiz is a 59 y.o. female who presents for a routine follow up for type 2 diabetes, hypothyroidism.  For diabetes she is  currently on metformin for 100 mg daily.  For hypothyroidism she is on levothyroxine 25 mcg daily.    She states that she feels phenomenal,     History obtained from : patient  Review of Systems   Constitutional:  Negative for appetite change, fatigue and unexpected weight change.   HENT:  Negative for trouble swallowing and voice change.    Cardiovascular:  Negative for chest pain and palpitations.   Gastrointestinal:  Negative for abdominal pain, constipation, diarrhea, nausea and vomiting.   Endocrine: Negative for cold intolerance, heat intolerance, polydipsia, polyphagia and polyuria.     Medical History Reviewed by provider this encounter:       Current Outpatient Medications on File Prior to Visit   Medication Sig Dispense Refill    acetaminophen (TYLENOL) 325 mg tablet Take 650 mg by mouth every 6 (six) hours as needed for mild pain      b complex vitamins tablet Take 1 tablet by mouth daily      BORON PO Take 1 Capful by mouth in the morning      cholecalciferol (VITAMIN D3) 1,000 units tablet Take 10,000 Units by mouth daily Only on Sundays      EPINEPHrine (EPIPEN JR) 0.15 mg/0.3 mL SOAJ Inject 0.3 mg into a muscle once      fluticasone (FLONASE) 50 mcg/act nasal spray 2 sprays into each nostril daily Per nare daily      Loratadine 10 MG CAPS Take by mouth daily after breakfast      [DISCONTINUED] levothyroxine (Levoxyl) 25 mcg tablet Take 1 tablet (25 mcg total) by mouth daily in the early morning 90 tablet 1    [DISCONTINUED] metFORMIN (GLUCOPHAGE-XR) 500 mg 24 hr tablet Take 500 mg by mouth daily with breakfast Once a day      Albuterol (PROVENTIL IN) Inhale (Patient not taking: Reported on 8/13/2024)      atorvastatin (LIPITOR) 10 mg tablet Take 1 tablet (10 mg total) by mouth daily 90 tablet 1    escitalopram (LEXAPRO) 10 mg tablet Take 1 tablet (10 mg total) by mouth daily (Patient not taking: Reported on 8/13/2024) 90 tablet 0    levalbuterol (Xopenex) 1.25 mg/3 mL nebulizer solution Take 3 mL  (1.25 mg total) by nebulization 3 (three) times a day (Patient not taking: Reported on 3/18/2024) 90 mL 3    methylprednisolone (MEDROL) 4 mg tablet Medrol dose pack Take as directed. 21 tablet 0    omeprazole (PriLOSEC) 40 MG capsule Take 1 capsule (40 mg total) by mouth daily before breakfast 90 capsule 1    [DISCONTINUED] metFORMIN (GLUCOPHAGE-XR) 500 mg 24 hr tablet Take 2 tablets (1,000 mg total) by mouth daily with breakfast (Patient taking differently: Take 500 mg by mouth daily with breakfast Once daily in the morning) 180 tablet 0    [DISCONTINUED] oxybutynin (DITROPAN-XL) 10 MG 24 hr tablet Take 1 tablet (10 mg total) by mouth daily at bedtime 90 tablet 0     No current facility-administered medications on file prior to visit.          Objective     There were no vitals taken for this visit.    Physical Exam  Vitals and nursing note reviewed.   Constitutional:       General: She is not in acute distress.     Appearance: She is well-developed.   HENT:      Head: Normocephalic and atraumatic.   Eyes:      Conjunctiva/sclera: Conjunctivae normal.   Cardiovascular:      Rate and Rhythm: Normal rate and regular rhythm.      Heart sounds: No murmur heard.  Pulmonary:      Effort: Pulmonary effort is normal. No respiratory distress.      Breath sounds: Normal breath sounds.   Abdominal:      Palpations: Abdomen is soft.      Tenderness: There is no abdominal tenderness.   Musculoskeletal:         General: No swelling.      Cervical back: Neck supple.   Skin:     General: Skin is warm and dry.      Capillary Refill: Capillary refill takes less than 2 seconds.   Neurological:      Mental Status: She is alert.   Psychiatric:         Mood and Affect: Mood normal.       Component      Latest Ref Rng 5/14/2024 7/18/2024   Cholesterol      See Comment mg/dL  178    Triglycerides      See Comment mg/dL  109    HDL      >=50 mg/dL  52    LDL Calculated      0 - 100 mg/dL  104 (H)    EXT Creatinine Urine      Reference  range not established. mg/dL  57.4    Albumin,U,Random      <20.0 mg/L  <7.0    Albumin Creat Ratio  --    Hemoglobin A1C      Normal 4.0-5.6%; PreDiabetic 5.7-6.4%; Diabetic >=6.5%; Glycemic control for adults with diabetes <7.0% %  5.5    eAG, EST AVG Glucose      mg/dl  111    THYROID MICROSOMAL ANTIBODY      0 - 34 IU/mL 22     THYROGLOBULIN AB      0.0 - 0.9 IU/mL <1.0     TSH 3RD GENERATON      0.450 - 4.500 uIU/mL  2.321       Legend:  (H) High

## 2024-09-18 NOTE — ASSESSMENT & PLAN NOTE
She is clinically and biochemically thyroid.  Continue levothyroxine at current dose.  TSH before next visit.

## 2024-09-20 ENCOUNTER — APPOINTMENT (OUTPATIENT)
Dept: URGENT CARE | Facility: CLINIC | Age: 59
End: 2024-09-20
Payer: COMMERCIAL

## 2024-09-20 ENCOUNTER — APPOINTMENT (OUTPATIENT)
Dept: RADIOLOGY | Facility: CLINIC | Age: 59
End: 2024-09-20
Payer: COMMERCIAL

## 2024-09-20 ENCOUNTER — OFFICE VISIT (OUTPATIENT)
Dept: URGENT CARE | Facility: CLINIC | Age: 59
End: 2024-09-20
Payer: COMMERCIAL

## 2024-09-20 VITALS
WEIGHT: 198 LBS | BODY MASS INDEX: 35.08 KG/M2 | HEIGHT: 63 IN | SYSTOLIC BLOOD PRESSURE: 119 MMHG | TEMPERATURE: 97.5 F | RESPIRATION RATE: 18 BRPM | HEART RATE: 63 BPM | OXYGEN SATURATION: 99 % | DIASTOLIC BLOOD PRESSURE: 54 MMHG

## 2024-09-20 DIAGNOSIS — M54.42 ACUTE LEFT-SIDED LOW BACK PAIN WITH LEFT-SIDED SCIATICA: Primary | ICD-10-CM

## 2024-09-20 DIAGNOSIS — M25.552 LEFT HIP PAIN: ICD-10-CM

## 2024-09-20 DIAGNOSIS — M54.42 ACUTE LEFT-SIDED LOW BACK PAIN WITH LEFT-SIDED SCIATICA: ICD-10-CM
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
## 2024-09-20 PROCEDURE — 72100 X-RAY EXAM L-S SPINE 2/3 VWS: CPT

## 2024-09-20 PROCEDURE — S9083 URGENT CARE CENTER GLOBAL: HCPCS

## 2024-09-20 PROCEDURE — 96372 THER/PROPH/DIAG INJ SC/IM: CPT

## 2024-09-20 PROCEDURE — 73502 X-RAY EXAM HIP UNI 2-3 VIEWS: CPT

## 2024-09-20 PROCEDURE — G0382 LEV 3 HOSP TYPE B ED VISIT: HCPCS

## 2024-09-20 RX ORDER — PREDNISONE 10 MG/1
TABLET ORAL
Qty: 36 TABLET | Refills: 0 | Status: SHIPPED | OUTPATIENT
Start: 2024-09-20 | End: 2024-09-29

## 2024-09-20 RX ORDER — KETOROLAC TROMETHAMINE 30 MG/ML
30 INJECTION, SOLUTION INTRAMUSCULAR; INTRAVENOUS ONCE
Status: COMPLETED | OUTPATIENT
Start: 2024-09-20 | End: 2024-09-20

## 2024-09-20 RX ADMIN — KETOROLAC TROMETHAMINE 30 MG: 30 INJECTION, SOLUTION INTRAMUSCULAR; INTRAVENOUS at 10:46

## 2024-09-20 NOTE — PROGRESS NOTES
[facility]        NAME: Jing Ortiz is a 59 y.o. female  : 1965    MRN: [id number]  DATE: 2024  TIME: 11:37 AM    Assessment and Plan   Acute left-sided low back pain with left-sided sciatica [M54.42]  1. Acute left-sided low back pain with left-sided sciatica  XR spine lumbar 2 or 3 views injury    ketorolac (TORADOL) injection 30 mg    predniSONE 10 mg tablet      2. Left hip pain  XR hip/pelv 2-3 vws left if performed    ketorolac (TORADOL) injection 30 mg    predniSONE 10 mg tablet      3. Fall, initial encounter          Provider Radiology Interpretation (preliminary)   Final results will be as per official Radiology Report when available:   Lumbar spine: No acute osseous abnormality  Left hip: No fracture, dislocation, or osseous abnormality    1035: pt stood up from seated position in wheelchair bent slightly forward and ambulated from exam room 4 to patient bathroom at beginning of hallway and back to exam room when done.     Administered one-time dose of Toradol at this clinic.  Will place patient on steroids and have her take over-the-counter Tylenol as needed for pain.  Will encourage her to use over-the-counter topical pain agents, her TENS unit, and moist heat to the areas that are affected.  If pain is not improved in the next 3 to 5 days she is to follow-up with her primary care physician for a recheck.  If symptoms significantly worsen, she develops any new symptoms, she develops saddle anesthesia, or she develops bowel and/or bladder incontinence if not present before she is to call 911 or be transported to the closest emergency room immediately.  Patient verbalized understanding.  Patient is agreeable with this plan.    Patient Instructions       Follow up with PCP in 3-5 days.  Proceed to  ER if symptoms worsen.    If tests are performed, our office will contact you with results only if changes need to made to the care plan discussed with you at the visit. You  can review your full results on [facility].    Chief Complaint     Chief Complaint   Patient presents with    Back Pain     Back and hip pain that started on on Wednesday dues to a fall         History of Present Illness       59-year-old female with significant past medical history presents with complaint of back and hip pain that started on Wednesday due to a fall.  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  She reached around the waterfall with her left arm, lost her balance, twisted, and fell to the ground.  She initially had no pain.  During the night Tuesday into Wednesday she awoke from her sleep with pain and by Wednesday morning the pain was significantly worse.  She has been taking over-the-counter Advil and Tylenol as needed for pain.  She is using a TENS unit to the low back and left hip region.  She reports the pain feels like it starts in her left lower back, comes through the left hip, radiates around to the top of her left thigh, and down the thigh to the level above the knee.  She reported off of work today as she is having difficulty standing up straight and ambulating due to the pain.  She denies saddle anesthesia or bowel and bladder incontinence.        Review of Systems   Review of Systems   Musculoskeletal:  Positive for arthralgias, back pain, gait problem and myalgias.         Current Medications       Current Outpatient Medications:     acetaminophen (TYLENOL) 325 mg tablet, Take 650 mg by mouth every 6 (six) hours as needed for mild pain, Disp: , Rfl:     b complex vitamins tablet, Take 1 tablet by mouth daily, Disp: , Rfl:     BORON PO, Take 1 Capful by mouth in the morning, Disp: , Rfl:     cholecalciferol (VITAMIN D3) 1,000 units tablet, Take 10,000 Units by mouth daily Only on Sundays, Disp: , Rfl:     fluticasone (FLONASE) 50 mcg/act nasal spray, 2 sprays into each nostril daily Per nare  daily, Disp: , Rfl:     levothyroxine (Levoxyl) 25 mcg tablet, Take 1 tablet (25 mcg total) by mouth daily in the early morning, Disp: 90 tablet, Rfl: 1    Loratadine 10 MG CAPS, Take by mouth daily after breakfast, Disp: , Rfl:     metFORMIN (GLUCOPHAGE-XR) 500 mg 24 hr tablet, Take 1 tablet (500 mg total) by mouth daily with breakfast Once a day, Disp: 90 tablet, Rfl: 0    oxybutynin (DITROPAN-XL) 10 MG 24 hr tablet, Take 1 tablet (10 mg total) by mouth daily at bedtime, Disp: 90 tablet, Rfl: 1    predniSONE 10 mg tablet, Take 6 tablets (60 mg total) by mouth daily for 3 days, THEN 4 tablets (40 mg total) daily for 3 days, THEN 2 tablets (20 mg total) daily for 3 days., Disp: 36 tablet, Rfl: 0    Albuterol (PROVENTIL IN), Inhale (Patient not taking: Reported on 8/13/2024), Disp: , Rfl:     atorvastatin (LIPITOR) 10 mg tablet, Take 1 tablet (10 mg total) by mouth daily, Disp: 90 tablet, Rfl: 1    EPINEPHrine (EPIPEN JR) 0.15 mg/0.3 mL SOAJ, Inject 0.3 mg into a muscle once (Patient not taking: Reported on 9/20/2024), Disp: , Rfl:     escitalopram (LEXAPRO) 10 mg tablet, Take 1 tablet (10 mg total) by mouth daily (Patient not taking: Reported on 8/13/2024), Disp: 90 tablet, Rfl: 0    levalbuterol (Xopenex) 1.25 mg/3 mL nebulizer solution, Take 3 mL (1.25 mg total) by nebulization 3 (three) times a day (Patient not taking: Reported on 3/18/2024), Disp: 90 mL, Rfl: 3    omeprazole (PriLOSEC) 40 MG capsule, Take 1 capsule (40 mg total) by mouth daily before breakfast, Disp: 90 capsule, Rfl: 1  No current facility-administered medications for this visit.    Current Allergies     Allergies as of 09/20/2024 - Reviewed 09/20/2024   Allergen Reaction Noted    Bee venom Anaphylaxis, Hives, Shortness Of Breath, Throat Swelling, and Wheezing 06/23/2021    Pneumococcal vaccines Hives 05/11/2020    Other Rash and Sneezing 10/01/2020            The following portions of the patient's history were reviewed and updated as  appropriate: allergies, current medications, past family history, past medical history, past social history, past surgical history and problem list.     Past Medical History:   Diagnosis Date    Anxiety 2020    Arthritis     Asthma     Bile duct stricture     Class 2 obesity due to excess calories in adult 2021    Contusion of ribs, right, initial encounter 2021    CPAP (continuous positive airway pressure) dependence     Diabetes mellitus (HCC)     Fatty liver     Functional urinary incontinence 2020    HPV (human papilloma virus) infection     Hyperlipidemia     Hypertension     Kidney stone     Moderate persistent asthma with acute exacerbation 2020    Obesity     Sleep apnea     Trigger ring finger of right hand 12/15/2022    Type 2 diabetes mellitus without complication, without long-term current use of insulin (HCC) 2020    Vitamin D deficiency        Past Surgical History:   Procedure Laterality Date    COLONOSCOPY  2015    tiny polyp=next     COLONOSCOPY      CYST REMOVAL      thumb cyst    HYSTEROSCOPY  2008    with biopsy    MAMMO (HISTORICAL)  2016    [facility]    OTHER SURGICAL HISTORY  1998    tongue papiloma    [place] TENDON SHEATH INCISION Right 2023    Procedure: RELEASE TRIGGER FINGER-right ring finger;  Surgeon: Nalini Conner;  Location:  MAIN OR;  Service: Orthopedics    TONSILLECTOMY      UPPER GASTROINTESTINAL ENDOSCOPY         Family History   Problem Relation Age of Onset    Obesity Mother         hx gastric bypass    Diabetes Mother     Hypertension Mother     Depression Mother          age 62    Arthritis Mother     Hyperlipidemia Mother     Heart attack Mother     Rheum arthritis Mother     COPD Mother     Heart disease Mother     Mental illness Mother     Obesity Father     Hypertension Father     Depression Father          age 82    Arthritis Father     Rheum arthritis Father     ADD / ADHD  "Father     Anxiety disorder Father     COPD Father     Heart disease Father     Mental illness Father     Obesity Brother     Depression Brother          age 52    Arthritis Brother     Cancer Brother 49        malignant neoplasm of lip, oral cavity, and pharynx    Drug abuse Brother     Substance Abuse Brother     Bipolar disorder Brother     Suicide Attempts Brother     Mental illness Brother     Breast cancer Maternal Aunt 30    Breast cancer Maternal Aunt 30    Breast cancer Maternal Aunt 65    Brain cancer Paternal Aunt     No Known Problems Paternal Aunt     No Known Problems Paternal Aunt     No Known Problems Paternal Aunt     No Known Problems Paternal Aunt          Medications have been verified.        Objective   /54   Pulse 63   Temp 97.5 °F (36.4 °C) (Temporal)   Resp 18   Ht 5' 3\" (1.6 m)   Wt 89.8 kg (198 lb)   SpO2 99%   BMI 35.07 kg/m²        Physical Exam     Physical Exam  Vitals and nursing note reviewed.   Constitutional:       Appearance: Normal appearance. She is obese.   HENT:      Head: Normocephalic and atraumatic.   Cardiovascular:      Rate and Rhythm: Normal rate and regular rhythm.      Pulses: Normal pulses.      Heart sounds: Normal heart sounds.   Pulmonary:      Effort: Pulmonary effort is normal.      Breath sounds: Normal breath sounds.   Musculoskeletal:      Cervical back: Normal.      Thoracic back: Normal.      Lumbar back: Tenderness present. No swelling, deformity, lacerations or spasms.      Right hip: Normal.      Left hip: Tenderness present. No deformity or lacerations. Normal range of motion.      Comments: Left SI joint TTP; pain radiates into upper left buttock and around to left hip. Pain continues down anterior thigh to level above the knee; gait slow and guarded; pt having difficulty standing fully upright   Skin:     General: Skin is warm and dry.      Capillary Refill: Capillary refill takes less than 2 seconds.   Neurological:      Mental " Status: She is alert.

## 2024-09-20 NOTE — LETTER
September 20, 2024     Patient: Jing Ortiz  YOB: 1965  Date of Visit: 9/20/2024      To Whom it May Concern:    Jing Ortiz is under my professional care. Jing was seen in my office on 9/20/2024. Jing may return to work on 9/23/2024 .    If you have any questions or concerns, please don't hesitate to call.         Sincerely,          BERTHA Bhagat        CC: No Recipients

## 2024-09-20 NOTE — PATIENT INSTRUCTIONS
Take the steroids as ordered daily. DO NOT take NSAID medicines while taking the steroids (advil, motrin, ibuprofen, aleve). You may take tylenol as needed and directed on packaging for pain.    Moist heat to the areas of pain up to 6 times per day for 15 minutes each time    You may continue to use your TENS unit as needed for pain control    You may purchase and use OTC topical pain agents of choice to help control your pain (lidocaine patches, salonpas, biofreeze, etc.). please be sure to remove the remnants of these items before using the TENS unit or heating pad to these areas.

## 2024-09-21 ENCOUNTER — HOSPITAL ENCOUNTER (EMERGENCY)
Facility: HOSPITAL | Age: 59
Discharge: HOME | End: 2024-09-21
Attending: EMERGENCY MEDICINE
Payer: COMMERCIAL

## 2024-09-21 VITALS
BODY MASS INDEX: 35.08 KG/M2 | SYSTOLIC BLOOD PRESSURE: 151 MMHG | WEIGHT: 198 LBS | RESPIRATION RATE: 18 BRPM | OXYGEN SATURATION: 98 % | HEART RATE: 79 BPM | DIASTOLIC BLOOD PRESSURE: 68 MMHG | TEMPERATURE: 98.2 F | HEIGHT: 63 IN

## 2024-09-21 DIAGNOSIS — M54.9 BACK PAIN: Primary | ICD-10-CM

## 2024-09-21 PROCEDURE — 99284 EMERGENCY DEPT VISIT MOD MDM: CPT

## 2024-09-21 PROCEDURE — 99284 EMERGENCY DEPT VISIT MOD MDM: CPT | Performed by: EMERGENCY MEDICINE

## 2024-09-21 RX ORDER — OXYCODONE HYDROCHLORIDE 5 MG/1
5 TABLET ORAL ONCE
Status: COMPLETED | OUTPATIENT
Start: 2024-09-21 | End: 2024-09-21

## 2024-09-21 RX ORDER — CYCLOBENZAPRINE HCL 10 MG
10 TABLET ORAL 2 TIMES DAILY PRN
Qty: 10 TABLET | Refills: 0 | Status: SHIPPED | OUTPATIENT
Start: 2024-09-21 | End: 2024-09-27 | Stop reason: ALTCHOICE

## 2024-09-21 RX ORDER — OXYCODONE HYDROCHLORIDE 5 MG/1
5 TABLET ORAL EVERY 8 HOURS PRN
Qty: 6 TABLET | Refills: 0 | Status: SHIPPED | OUTPATIENT
Start: 2024-09-21 | End: 2024-09-23

## 2024-09-21 RX ORDER — CYCLOBENZAPRINE HCL 10 MG
10 TABLET ORAL ONCE
Status: COMPLETED | OUTPATIENT
Start: 2024-09-21 | End: 2024-09-21

## 2024-09-21 RX ADMIN — CYCLOBENZAPRINE HYDROCHLORIDE 10 MG: 10 TABLET, FILM COATED ORAL at 04:57

## 2024-09-21 RX ADMIN — OXYCODONE HYDROCHLORIDE 5 MG: 5 TABLET ORAL at 06:12

## 2024-09-21 NOTE — DISCHARGE INSTRUCTIONS
If you develop any new or worsening symptoms please immediately return to your nearest emergency department.    Please be aware the medication you are being prescribed oxycodone & flexeril can be sedating and therefore you should not use with any other sedating medications or alcohol, operate heavy machinery or drive until you know how it affects you.

## 2024-09-21 NOTE — ED PROVIDER NOTES
1. Back pain      ED Disposition       ED Disposition   Discharge    Condition   Stable    Date/Time   Sat Sep 21, 2024  7:01 AM    Comment   Jing PUGH Angel discharge to home/self care.                   Assessment & Plan       Medical Decision Making  59-year-old female presenting for left hip/buttock going into the left leg.  Fell a few days ago.  Has been ambulatory however states she is feeling tighter in the area.  Been trialing NSAIDs with no real relief.  Does state that she had some relief with Toradol along with the TENS unit.  Offered her Toradol here in the emergency department however patient states she would prefer not to get stuck again.  He is agreeable to muscle relaxers.  Patient with x-ray performed for this pain which was negative.    Patient with no red flag symptoms.  Pain improved with symptomatic treatment.  Patient feels comfortable with no further imaging or blood work at this time.  Strict return precautions discussed and provided and provided with pain medications at home and comprehensive spine center referral.    Risk  Prescription drug management.                ED Course as of 09/21/24 0705   Sat Sep 21, 2024   0607 Pain is down to a 7 from a 10 when she first got here.   0700 Patient states pain is improved at this time although still present.  Feels comfortable going home.  Is awake alert and oriented and has no complaints of feeling tired at this time.  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       Medications   cyclobenzaprine (FLEXERIL) tablet 10 mg (10 mg Oral Given 9/21/24 2041)   oxyCODONE (ROXICODONE) IR tablet 5 mg (5 mg Oral Given 9/21/24 0612)       History of Present Illness       60 yo female with reports of fall 3 days ago where she twisted. Denies any head strike/loc. Complaining of L lower back/buttock pain into the L leg. States worse with movement. Denies any issues with urination or bowel movements other than she has a hard time getting to the bathroom in time  because she is in pain and moving more slowly. Denies numbness or tingling into the groin, weakness in the legs or any other symptoms or pain.  Was seen at urgent care earlier today had an x-ray which was negative of her hips.  Was given IM Toradol which she does state helps however that wore off.  Was given steroids along with she has a TENS unit at home which she has been using to help with her pain.  States that the pain is just not letting her sleep at this point.      Fall  Associated symptoms: back pain        Review of Systems   Musculoskeletal:  Positive for arthralgias and back pain.   All other systems reviewed and are negative.          Objective     ED Triage Vitals   Temperature Pulse Blood Pressure Respirations SpO2 Patient Position - Orthostatic VS   09/21/24 0444 09/21/24 0444 09/21/24 0444 09/21/24 0444 09/21/24 0444 09/21/24 0444   98.2 °F (36.8 °C) 74 167/96 20 97 % Sitting      Temp Source Heart Rate Source BP Location FiO2 (%) Pain Score    09/21/24 0444 -- 09/21/24 0444 -- 09/21/24 0452    Temporal  Left arm  10 - Worst Possible Pain        Physical Exam  Vitals and nursing note reviewed.   Constitutional:       General: She is not in acute distress.     Appearance: She is well-developed. She is not diaphoretic.   HENT:      Head: Normocephalic and atraumatic.      Right Ear: External ear normal.      Left Ear: External ear normal.      Nose: Nose normal.   Eyes:      General: No scleral icterus.        Right eye: No discharge.         Left eye: No discharge.      Extraocular Movements: EOM normal.      Conjunctiva/sclera: Conjunctivae normal.      Pupils: Pupils are equal, round, and reactive to light.   Neck:      Vascular: No JVD.      Trachea: No tracheal deviation.   Cardiovascular:      Rate and Rhythm: Normal rate and regular rhythm.      Heart sounds: Normal heart sounds. No murmur heard.     No friction rub. No gallop.   Pulmonary:      Effort: Pulmonary effort is normal. No  respiratory distress.      Breath sounds: Normal breath sounds. No stridor. No wheezing or rales.   Abdominal:      General: Bowel sounds are normal. There is no distension.      Palpations: Abdomen is soft. There is no mass.      Tenderness: There is no abdominal tenderness. There is no guarding.   Musculoskeletal:         General: No deformity or edema.      Cervical back: Normal, normal range of motion and neck supple.      Thoracic back: Normal.      Lumbar back: Spasms and tenderness present. No swelling, edema, deformity, signs of trauma, lacerations or bony tenderness. Decreased range of motion. Positive left straight leg raise test. Negative right straight leg raise test. No scoliosis.        Back:       Comments: Area of tenderness.  No erythema, induration, fluctuance, ecchymosis, crepitus, deformity noted on overlying skin exam     Skin:     General: Skin is warm and dry.      Coloration: Skin is not pale.      Findings: No erythema or rash.   Neurological:      Mental Status: She is alert and oriented to person, place, and time.      Cranial Nerves: No cranial nerve deficit.      Sensory: No sensory deficit.      Motor: No abnormal muscle tone.   Psychiatric:         Mood and Affect: Mood and affect normal.         Behavior: Behavior normal.         Thought Content: Thought content normal.         Judgment: Judgment normal.         Labs Reviewed - No data to display  No orders to display       Procedures    ED Medication and Procedure Management   Prior to Admission Medications   Prescriptions Last Dose Informant Patient Reported? Taking?   Albuterol (PROVENTIL IN) Unknown Self Yes No   Sig: Inhale   Patient not taking: Reported on 8/13/2024   BORON PO 9/20/2024 Self Yes Yes   Sig: Take 1 Capful by mouth in the morning   EPINEPHrine (EPIPEN JR) 0.15 mg/0.3 mL SOAJ Unknown Self Yes No   Sig: Inject 0.3 mg into a muscle once   Patient not taking: Reported on 9/20/2024   Loratadine 10 MG CAPS 9/20/2024 Self  Yes Yes   Sig: Take by mouth daily after breakfast   acetaminophen (TYLENOL) 325 mg tablet 2024 at 0400 Self Yes Yes   Sig: Take 650 mg by mouth every 6 (six) hours as needed for mild pain   atorvastatin (LIPITOR) 10 mg tablet   No No   Sig: Take 1 tablet (10 mg total) by mouth daily   b complex vitamins tablet 2024 Self Yes Yes   Sig: Take 1 tablet by mouth daily   cholecalciferol (VITAMIN D3) 1,000 units tablet 2024 Self Yes Yes   Sig: Take 10,000 Units by mouth daily Only on Sundays   escitalopram (LEXAPRO) 10 mg tablet   No No   Sig: Take 1 tablet (10 mg total) by mouth daily   Patient not taking: Reported on 2024   fluticasone (FLONASE) 50 mcg/act nasal spray 2024 Self Yes Yes   Si sprays into each nostril daily Per nare daily   levalbuterol (Xopenex) 1.25 mg/3 mL nebulizer solution  Self No No   Sig: Take 3 mL (1.25 mg total) by nebulization 3 (three) times a day   Patient not taking: Reported on 3/18/2024   levothyroxine (Levoxyl) 25 mcg tablet 2024  No Yes   Sig: Take 1 tablet (25 mcg total) by mouth daily in the early morning   metFORMIN (GLUCOPHAGE-XR) 500 mg 24 hr tablet 2024  No Yes   Sig: Take 1 tablet (500 mg total) by mouth daily with breakfast Once a day   omeprazole (PriLOSEC) 40 MG capsule  Self No No   Sig: Take 1 capsule (40 mg total) by mouth daily before breakfast   oxybutynin (DITROPAN-XL) 10 MG 24 hr tablet 2024  No Yes   Sig: Take 1 tablet (10 mg total) by mouth daily at bedtime   predniSONE 10 mg tablet 2024  No Yes   Sig: Take 6 tablets (60 mg total) by mouth daily for 3 days, THEN 4 tablets (40 mg total) daily for 3 days, THEN 2 tablets (20 mg total) daily for 3 days.      Facility-Administered Medications Last Administration Doses Remaining   ketorolac (TORADOL) injection 30 mg 2024 10:46 AM 0        Patient's Medications   Discharge Prescriptions    CYCLOBENZAPRINE (FLEXERIL) 10 MG TABLET    Take 1 tablet (10 mg total) by mouth 2  (two) times a day as needed for muscle spasms for up to 5 days       Start Date: 9/21/2024 End Date: 9/26/2024       Order Dose: 10 mg       Quantity: 10 tablet    Refills: 0    OXYCODONE (ROXICODONE) 5 IMMEDIATE RELEASE TABLET    Take 1 tablet (5 mg total) by mouth every 8 (eight) hours as needed for severe pain or moderate pain for up to 2 days Max Daily Amount: 15 mg       Start Date: 9/21/2024 End Date: 9/23/2024       Order Dose: 5 mg       Quantity: 6 tablet    Refills: 0          Ebenezer Mckeon DO  09/21/24 0705

## 2024-09-23 ENCOUNTER — PATIENT MESSAGE (OUTPATIENT)
Dept: ENDOCRINOLOGY | Facility: CLINIC | Age: 59
End: 2024-09-23

## 2024-09-23 ENCOUNTER — NURSE TRIAGE (OUTPATIENT)
Dept: PHYSICAL THERAPY | Facility: OTHER | Age: 59
End: 2024-09-23

## 2024-09-23 DIAGNOSIS — M54.50 ACUTE LEFT-SIDED LOW BACK PAIN, UNSPECIFIED WHETHER SCIATICA PRESENT: Primary | ICD-10-CM

## 2024-09-23 NOTE — TELEPHONE ENCOUNTER
Additional Information   Negative: Has the patient had unexplained weight loss?   Negative: Does the patient have a fever?   Negative: Is the patient experiencing urine retention?   Negative: Has the patient experienced major trauma? (fall from height, high speed collision, direct blow to spine) and is also experiencing nausea, light-headedness, or loss of consciousness?   Negative: Is the patient experiencing blood in sputum?   Negative: Is the patient experiencing acute drop foot or paralysis?   Negative: Is this a chronic condition?    Protocols used: [facility] Protocol  Nurse completed triage and NO RF s/s present. Referral entered for the [place] site and contact/phone number info given to the patient as well.   Patients information was sent to the preferred site and pt made aware clerical would be calling to schedule the evaluation appointment. Nurse encouraged her to call the site if she does not hear from clerical beforehand. Patient Agreed.    Patient did not voice any additional questions or concerns at this time.   Patient is aware current/past complaints, relevant dx, additional referrals and treatment/options will be discussed at the evaluation/consultation appointment.   Patient is in agreement with plan to be evaluated by [facility] therapist/DPT.   Patient very appreciative of CB and referral placement.     Nurse wished her well and referral closed.

## 2024-09-23 NOTE — TELEPHONE ENCOUNTER
"Patient called into [facility] on Saturday and left v/m @7:46am .    Returned patient's call today 09/23 @8:18am.    Additional Information   Negative: Is this related to a work injury?   Negative: Is this related to an MVA?   Negative: Are you currently recieving homecare services?    Background - Initial Assessment  Clinical complaint: ED visit on 09/21 due to Left Lower Back Pain that started on Wednesday 09/18 morning after patient \"twisted' and fell backwards at home. Patient was seen at  09/20. Hx of Osteoarthritis. Pain radiates to left buttock and wraps around her hips and the front of her left thigh. Also radiates to mid-back. No pain going down her leg or neck. Numbness in left thigh \"but is nothing new\". No tingling sensation. Pain is constant and persistent. Worse with certain positions. Patient states she can't stand up straight, or sit for too long, walking is also difficult. Patient described pain as sharp and aching. She is taking Flexeril, Oxycodone (took the last pill today), Tylenol, and TENS unit.   Date of onset: Wednesday 09/18  Frequency of pain: constant and persistent.  Quality of pain: aching, numb, and sharp.    Protocols used: [facility] Protocol    "

## 2024-09-24 DIAGNOSIS — M54.50 LOW BACK PAIN, UNSPECIFIED BACK PAIN LATERALITY, UNSPECIFIED CHRONICITY, UNSPECIFIED WHETHER SCIATICA PRESENT: Primary | ICD-10-CM

## 2024-09-26 ENCOUNTER — NURSE TRIAGE (OUTPATIENT)
Age: 59
End: 2024-09-26

## 2024-09-26 ENCOUNTER — TELEPHONE (OUTPATIENT)
Age: 59
End: 2024-09-26

## 2024-09-26 NOTE — TELEPHONE ENCOUNTER
Patient was a patient of Dr. Falk. She reports that she was unable to get an appointment or was not called for a new provider. Patient had a fall and had to go to the ER, presenting for left hip/buttock going into the left leg. Fell a few days ago. Has been ambulatory however states she is feeling tighter in the area. Patient was prescribed medication but is still in severe pain and would like someone to see her today.    Please advice the patient

## 2024-09-26 NOTE — TELEPHONE ENCOUNTER
"Pt states that she fell on 09/18/2024 when she was trying to turn her well pump off.  Since then she has been having 10/10 lower back pain.  She was evaluated in Urgent Care and the ED for this pain.  She was given Flexeril, oxycodone, IM Toradol and steroids.  She has a TENS unit that she uses.  She is seeing PT.  Today she went to PT for her initial eval and the physical therapist told her she needs to get her pain under control prior to doing PT.  Pt agrees, however, she was a pt of Dr. Falk and she left the practice.  Therese NICHOLS, who was speaking to Jing prior to transferring her to the nurse line called the office.  The office told her that Jing would have to establish as a new patient with one of their other providers. They don't have anything until October.  Pt is willing to be evaluated by anyone, New Patient appt made with Clara for tomorrow.  RN advised that pt go the ED in the meantime for immediate pain management.       Reason for Disposition  • Injury and pain has not improved after 3 days    Answer Assessment - Initial Assessment Questions  1. MECHANISM: \"How did the injury happen?\" (Consider the possibility of domestic violence or elder abuse)      Twisted and fell while trying to turn her well pump off  2. ONSET: \"When did the injury happen?\" (Minutes or hours ago)      A few weeks ago   3. LOCATION: \"What part of the back is injured?\"      Lower back  4. SEVERITY: \"Can you move the back normally?\"      no  5. PAIN: \"Is there any pain?\" If Yes, ask: \"How bad is the pain?\"   (Scale 1-10; or mild, moderate, severe)      Yes   6. CORD SYMPTOMS: Any weakness or numbness of the arms or legs?\"      no  7. SIZE: For cuts, bruises, or swelling, ask: \"How large is it?\" (e.g., inches or centimeters)      no  8. TETANUS: For any breaks in the skin, ask: \"When was the last tetanus booster?\"      N/a  9. OTHER SYMPTOMS: \"Do you have any other symptoms?\" (e.g., abdominal pain, blood in urine)      " "no  10. PREGNANCY: \"Is there any chance you are pregnant?\" \"When was your last menstrual period?\"        no    Protocols used: Back Injury-ADULT-[place]    "

## 2024-09-27 ENCOUNTER — OFFICE VISIT (OUTPATIENT)
Dept: FAMILY MEDICINE CLINIC | Facility: CLINIC | Age: 59
End: 2024-09-27
Payer: COMMERCIAL

## 2024-09-27 VITALS
DIASTOLIC BLOOD PRESSURE: 70 MMHG | WEIGHT: 204.4 LBS | SYSTOLIC BLOOD PRESSURE: 142 MMHG | TEMPERATURE: 97.7 F | RESPIRATION RATE: 16 BRPM | BODY MASS INDEX: 36.21 KG/M2 | OXYGEN SATURATION: 97 % | HEART RATE: 85 BPM | HEIGHT: 63 IN

## 2024-09-27 DIAGNOSIS — M62.830 SPASM OF MUSCLE OF LOWER BACK: Primary | ICD-10-CM

## 2024-09-27 DIAGNOSIS — J42 CHRONIC BRONCHITIS, UNSPECIFIED CHRONIC BRONCHITIS TYPE (HCC): ICD-10-CM

## 2024-09-27 PROCEDURE — 99213 OFFICE O/P EST LOW 20 MIN: CPT | Performed by: NURSE PRACTITIONER

## 2024-09-27 RX ORDER — CYCLOBENZAPRINE HCL 10 MG
10 TABLET ORAL 3 TIMES DAILY PRN
Qty: 60 TABLET | Refills: 0 | Status: SHIPPED | OUTPATIENT
Start: 2024-09-27

## 2024-09-27 NOTE — PROGRESS NOTES
"Ambulatory Visit  Name: Jing Ortiz      : 1965      MRN: [id number]  Encounter Provider: BERTHA Luke  Encounter Date: 2024   Encounter department: [facility]    Assessment & Plan  Spasm of muscle of lower back  See hpi for details.  She will be working with [employer] for PT but was advised she needs a muscle relaxer to help them treat her more successfully.  Her strength is intact, no numbness, red flag symptoms.  She can continue tens, continue walker utilization.  Pt does not plan on follow up in this practice; she is scheduled to see new provider at the practice where last pcp was but she is informed she can call with concerns.  Orders:    cyclobenzaprine (FLEXERIL) 10 mg tablet; Take 1 tablet (10 mg total) by mouth 3 (three) times a day as needed for muscle spasms    Chronic bronchitis, unspecified chronic bronchitis type (HCC)  States she does not have copd, she says she has allergies and sinus issues and she was told she does not need to treat this unless she has a respiratory infection.             History of Present Illness     Pt is a 60 yo female here today for \"emergency evaluation\" for evaluation of back pain.  Past medical history of obstructive sleep apnea, asthma, diabetes, urinary incontinence, anxiety, obesity, hyperlipidemia, fatty liver.  She fell in  trying to turn of her well pump and has had 10/10 back pain since.  Seen in urgent care and ER already.  She received flexeril, oxycodone, IM toradol, and steroids.  Xrays of hip and lumbar spine unrevealing.  She has a TENS unit she uses at home.  When she went for initial PT evaluation she was told she needs to have her pain under better control before being treated.  She works at [employer] and a therapist saw her there and said she needs to get her muscles to relax so that he could actually work with her.  He also gave her a walker which she says is helping her walk much better.  Her " "previous PCP left the practice so she needed to change to new provider.  She is only here today because she was unable to get in with anybody sooner and she will be going to the new pcp in the future.  She states that all she wants is a muscle relaxer.  Pain starts slightly above her L SI joint and wraps around her hip and to the anterior thigh not lower than the knee.  She rates pain 6/10 compared to 8-9/10 for the last few days.  She has weakness in her left thigh, denies numbness/tingling.  She is still alternating ibuprofen, acetaminophen, and TENS.  She is also doing some exercises that the PT gave her to do at home.          Review of Systems   Constitutional:  Negative for appetite change and fatigue.   Respiratory:  Negative for shortness of breath.    Cardiovascular:  Negative for chest pain and palpitations.   Gastrointestinal:  Negative for constipation, diarrhea, nausea and vomiting.   Genitourinary:  Positive for enuresis (baseline, she feels this is a bit worse because she can't get to the bathroom fast enough).   Musculoskeletal:  Positive for back pain and myalgias.   Neurological:  Positive for weakness. Negative for numbness.   Psychiatric/Behavioral:  Negative for sleep disturbance.            Objective     /70 (BP Location: Left arm, Patient Position: Sitting, Cuff Size: Standard)   Pulse 85   Temp 97.7 °F (36.5 °C) (Tympanic)   Resp 16   Ht 5' 3\" (1.6 m)   Wt 92.7 kg (204 lb 6.4 oz)   SpO2 97%   BMI 36.21 kg/m²     Physical Exam  Vitals reviewed.   Constitutional:       General: She is awake. She is not in acute distress.     Appearance: Normal appearance. She is well-developed and well-groomed. She is not ill-appearing.   Eyes:      General: Lids are normal.      Conjunctiva/sclera: Conjunctivae normal.   Cardiovascular:      Rate and Rhythm: Normal rate and regular rhythm.      Pulses: Normal pulses.      Heart sounds: Normal heart sounds. No murmur heard.  Pulmonary:      Effort: " Pulmonary effort is normal. No tachypnea, accessory muscle usage or respiratory distress.      Breath sounds: Normal breath sounds.   Abdominal:      General: Bowel sounds are normal.      Tenderness: There is no abdominal tenderness.   Musculoskeletal:      Lumbar back: Spasms present. No tenderness or bony tenderness. Decreased range of motion (due to pain).      Right lower leg: No edema.      Left lower leg: No edema.   Neurological:      Mental Status: She is alert and oriented to person, place, and time.      Motor: No weakness (only limited by pain).      Deep Tendon Reflexes:      Reflex Scores:       Patellar reflexes are 1+ on the right side and 1+ on the left side.  Psychiatric:         Attention and Perception: Attention normal.         Mood and Affect: Mood normal.         Speech: Speech normal.         Behavior: Behavior normal. Behavior is cooperative.         Thought Content: Thought content normal.         Cognition and Memory: Cognition normal.         Judgment: Judgment normal.

## 2024-09-27 NOTE — ASSESSMENT & PLAN NOTE
States she does not have copd, she says she has allergies and sinus issues and she was told she does not need to treat this unless she has a respiratory infection.

## 2024-10-01 DIAGNOSIS — E66.813 CLASS 3 SEVERE OBESITY DUE TO EXCESS CALORIES WITH SERIOUS COMORBIDITY AND BODY MASS INDEX (BMI) OF 40.0 TO 44.9 IN ADULT (HCC): ICD-10-CM

## 2024-10-01 DIAGNOSIS — E66.01 CLASS 3 SEVERE OBESITY DUE TO EXCESS CALORIES WITH SERIOUS COMORBIDITY AND BODY MASS INDEX (BMI) OF 40.0 TO 44.9 IN ADULT (HCC): ICD-10-CM

## 2024-10-01 DIAGNOSIS — E11.9 TYPE 2 DIABETES MELLITUS WITHOUT COMPLICATION, WITHOUT LONG-TERM CURRENT USE OF INSULIN (HCC): Primary | ICD-10-CM

## 2024-10-01 RX ORDER — TIRZEPATIDE 2.5 MG/.5ML
2.5 INJECTION, SOLUTION SUBCUTANEOUS WEEKLY
Qty: 2 ML | Refills: 0 | Status: SHIPPED | OUTPATIENT
Start: 2024-10-01 | End: 2024-10-03

## 2024-10-03 DIAGNOSIS — E11.9 TYPE 2 DIABETES MELLITUS WITHOUT COMPLICATION, WITHOUT LONG-TERM CURRENT USE OF INSULIN (HCC): Primary | ICD-10-CM

## 2024-10-03 DIAGNOSIS — E66.01 CLASS 3 SEVERE OBESITY DUE TO EXCESS CALORIES WITH SERIOUS COMORBIDITY AND BODY MASS INDEX (BMI) OF 40.0 TO 44.9 IN ADULT (HCC): ICD-10-CM

## 2024-10-03 DIAGNOSIS — E66.813 CLASS 3 SEVERE OBESITY DUE TO EXCESS CALORIES WITH SERIOUS COMORBIDITY AND BODY MASS INDEX (BMI) OF 40.0 TO 44.9 IN ADULT (HCC): ICD-10-CM

## 2024-10-04 ENCOUNTER — TELEPHONE (OUTPATIENT)
Age: 59
End: 2024-10-04

## 2024-10-04 NOTE — TELEPHONE ENCOUNTER
"Pt message:    \"So Monjaro is covered by my insurance but they are requiring a prior authorization. \"  "

## 2024-10-07 ENCOUNTER — TELEPHONE (OUTPATIENT)
Dept: FAMILY MEDICINE CLINIC | Facility: CLINIC | Age: 59
End: 2024-10-07

## 2024-10-07 NOTE — TELEPHONE ENCOUNTER
Patient dropped off [facility] paperwork for Dr. Henderson to complete. Scanned into chart.

## 2024-10-10 ENCOUNTER — VBI (OUTPATIENT)
Dept: ADMINISTRATIVE | Facility: OTHER | Age: 59
End: 2024-10-10

## 2024-10-10 NOTE — TELEPHONE ENCOUNTER
10/10/24 12:26 PM     Chart reviewed for Diabetic Eye Exam ; nothing is submitted to the patient's insurance at this time.     Rita Hoyos MA   [facility]

## 2024-10-28 ENCOUNTER — AMB VIDEO VISIT (OUTPATIENT)
Dept: OTHER | Facility: HOSPITAL | Age: 59
End: 2024-10-28
Payer: COMMERCIAL

## 2024-10-28 PROCEDURE — 99212 OFFICE O/P EST SF 10 MIN: CPT | Performed by: FAMILY MEDICINE

## 2024-10-28 NOTE — CARE ANYWHERE EVISITS
Visit Summary for СЕРГЕЙ CABRERA - Gender: Female - Date of Birth: 1965  Date: [id number] - Duration: 5 minutes  Patient: СЕРГЕЙ CABRERA  Provider: Kevlin Frost    Patient Contact Information  Address  [address] [place] [address]; [place] [address]  [phone]    Visit Topics  bronchitis. extreme coughing for last 4 days. occasionally productive for dark yellow sputum. no fever. [Added By: Self - 2024-10-28]    Triage Questions   What is your current physical address in the event of a medical emergency? Answer []  Are you allergic to any medications? Answer []  Are you now or could you be pregnant? Answer []  Do you have any immune system compromise or chronic lung   disease? Answer []  Do you have any vulnerable family members in the home (infant, pregnant, cancer, elderly)? Answer []     Conversation Transcripts  [0A][0A] [Notification] You are connected with Kelvin Frost, Family Physician.[0A][Notification] СЕРГЕЙ CABRERA is located in [place].[0A][Notification] СЕРГЕЙ CABRERA has shared health history...[0A][Notification] Kelvin Frost has added a   diagnosis/procedure code.[0A][Notification] Kelvin Frost has added a prescription.[0A][Notification] Kelvin Frost has added a prescription.[0A]    Diagnosis  Acute bronchitis due to other specified organisms    Procedures  Value: [id number] Code: CPT-4 OL DIG E/M SVC 11-20 MIN    Medications Prescribed    prednisone      Frequency :   Patient Instructions : 2 tab po daily  Refills : 0  Instructions to the Pharmacist : Substitutions allowed    doxycycline monohydrate      Frequency :   Patient Instructions : 1 cap po BID for 7 days  Refills : 0  Instructions to the Pharmacist : Substitutions allowed      Provider Notes  [0A][0A] We strongly encourage you[0A]to share the following record of today's visit with your primary care[0A]physician.[0A][0A][0A][0A]Contact phone number:[0A][0A][0A][0A]Mode of Communication: online[0A][0A][0A][0A]HPI: Over the past week the patient    has had a[0A]deep, wet cough associated with some chest discomfort and mild shortness of[0A]breath. The patient has felt achy and tired but has not had a documented fever.[0A][0A][0A][0A]PMH:Asthma, sleep apnea, DM, overactive   bladder[0A][0A]PSH:[0A][0A]Smoking HX:[0A][0A]Meds:reviewed[0A][0A]Allergies:NKDA[0A][0A]LMP:[0A][0A][0A][0A]PE:[0A][0A]Gen: Slightly ill appearing but non-toxic[0A][0A]Nose: Clear[0A][0A]Resp: Nasal voice and throat cleaning. No[0A]tachypnea,   retractions, nasal flaring or wheeze[0A][0A]Sinus: No sinus tenderness. No SOB or wheeze.[0A][0A][0A][0A]Assessment: acute bronchitis Diagnosis Code[0A]J20.9[0A][0A][0A][0A]Plan:[0A][0A]1. Discussed options. Prescription as described[0A]below[0A][0A]2.   Recommend supportive treatment- Fluids, rest,[0A]Tylenol, OTC cough supressants[0A][0A]3. Follow up if symptoms worsen or if symptoms[0A]persist longer than 1 weeks of if they worsen[0A][0A]4. Discussed precautions[0A][0A][0A][0A]Antibiotic indication:   Acute Bronchitis[0A][0A]Antibiotic choice: doxycycline[0A][0A][0A][0A]Follow up:[0A][0A]1. If you received a prescription at this visit[0A]and have any questions or problems with the prescription, call [phone] for[0A]assistance[0A][0A]2. Please   re-connect for another online visit or[0A]see an in-person provider should your symptoms worsen or persist longer than 1[0A]week[0A][0A]3. Please print a copy of this note and send it[0A]to your regular doctor, or take it to your next visit so it may be   included in[0A]your medical record[0A][0A][0A][0A]Patient voiced understanding and agrees to plan.[0A][0A][0A][0A]Please be sure to see your PCP on an annual[0A]basis.[0A]    Electronically signed by: Kelvin Frost(NPI [id number])

## 2024-10-31 DIAGNOSIS — E11.9 TYPE 2 DIABETES MELLITUS WITHOUT COMPLICATION, WITHOUT LONG-TERM CURRENT USE OF INSULIN (HCC): Primary | ICD-10-CM

## 2024-10-31 RX ORDER — TIRZEPATIDE 5 MG/.5ML
5 INJECTION, SOLUTION SUBCUTANEOUS WEEKLY
Qty: 2 ML | Refills: 0 | Status: SHIPPED | OUTPATIENT
Start: 2024-10-31 | End: 2024-11-30

## 2024-11-22 ENCOUNTER — TELEPHONE (OUTPATIENT)
Dept: FAMILY MEDICINE CLINIC | Facility: CLINIC | Age: 59
End: 2024-11-22

## 2024-11-26 DIAGNOSIS — E11.9 TYPE 2 DIABETES MELLITUS WITHOUT COMPLICATION, WITHOUT LONG-TERM CURRENT USE OF INSULIN (HCC): ICD-10-CM

## 2024-11-26 RX ORDER — TIRZEPATIDE 5 MG/.5ML
5 INJECTION, SOLUTION SUBCUTANEOUS WEEKLY
Qty: 6 ML | Refills: 0 | Status: SHIPPED | OUTPATIENT
Start: 2024-11-26 | End: 2025-02-24

## 2024-12-18 DIAGNOSIS — E03.8 OTHER SPECIFIED HYPOTHYROIDISM: ICD-10-CM

## 2024-12-19 RX ORDER — LEVOTHYROXINE SODIUM 25 UG/1
25 TABLET ORAL
Qty: 90 TABLET | Refills: 0 | Status: SHIPPED | OUTPATIENT
Start: 2024-12-19

## 2025-01-09 ENCOUNTER — RA CDI HCC (OUTPATIENT)
Dept: OTHER | Facility: HOSPITAL | Age: 60
End: 2025-01-09

## 2025-01-09 NOTE — PROGRESS NOTES
HCC coding opportunities          Chart Reviewed number of suggestions sent to Provider: 1     Patients Insurance        Commercial Insurance: [employer]       J45.41: Moderate persistent asthma with (acute) exacerbation [J45.41]      Last assessed on 9/18/2023 please review and assess and document per MEAT if applicable for 2025

## 2025-01-15 ENCOUNTER — OFFICE VISIT (OUTPATIENT)
Dept: FAMILY MEDICINE CLINIC | Facility: CLINIC | Age: 60
End: 2025-01-15
Payer: COMMERCIAL

## 2025-01-15 ENCOUNTER — TELEPHONE (OUTPATIENT)
Dept: FAMILY MEDICINE CLINIC | Facility: CLINIC | Age: 60
End: 2025-01-15

## 2025-01-15 VITALS
TEMPERATURE: 97.2 F | HEIGHT: 63 IN | OXYGEN SATURATION: 99 % | DIASTOLIC BLOOD PRESSURE: 70 MMHG | RESPIRATION RATE: 18 BRPM | WEIGHT: 173 LBS | BODY MASS INDEX: 30.65 KG/M2 | HEART RATE: 78 BPM | SYSTOLIC BLOOD PRESSURE: 116 MMHG

## 2025-01-15 DIAGNOSIS — R32 URINARY INCONTINENCE, UNSPECIFIED TYPE: ICD-10-CM

## 2025-01-15 DIAGNOSIS — Z00.00 ANNUAL PHYSICAL EXAM: Primary | ICD-10-CM

## 2025-01-15 DIAGNOSIS — E11.9 TYPE 2 DIABETES MELLITUS WITHOUT COMPLICATION, WITHOUT LONG-TERM CURRENT USE OF INSULIN (HCC): ICD-10-CM

## 2025-01-15 DIAGNOSIS — J45.20 MILD INTERMITTENT ASTHMA WITHOUT COMPLICATION: ICD-10-CM

## 2025-01-15 DIAGNOSIS — G47.33 OSA ON CPAP: ICD-10-CM

## 2025-01-15 DIAGNOSIS — J42 CHRONIC BRONCHITIS, UNSPECIFIED CHRONIC BRONCHITIS TYPE (HCC): ICD-10-CM

## 2025-01-15 DIAGNOSIS — E78.2 MIXED HYPERLIPIDEMIA: ICD-10-CM

## 2025-01-15 DIAGNOSIS — E03.8 SUBCLINICAL HYPOTHYROIDISM: ICD-10-CM

## 2025-01-15 PROBLEM — J45.41 MODERATE PERSISTENT ASTHMA WITH ACUTE EXACERBATION: Status: RESOLVED | Noted: 2020-09-16 | Resolved: 2025-01-15

## 2025-01-15 PROBLEM — K76.0 METABOLIC DYSFUNCTION-ASSOCIATED STEATOTIC LIVER DISEASE (MASLD): Status: RESOLVED | Noted: 2022-12-15 | Resolved: 2025-01-15

## 2025-01-15 PROBLEM — J20.9 ACUTE BRONCHITIS: Status: RESOLVED | Noted: 2023-09-29 | Resolved: 2025-01-15

## 2025-01-15 PROBLEM — E66.01 CLASS 3 SEVERE OBESITY DUE TO EXCESS CALORIES WITH SERIOUS COMORBIDITY AND BODY MASS INDEX (BMI) OF 40.0 TO 44.9 IN ADULT (HCC): Status: RESOLVED | Noted: 2021-02-24 | Resolved: 2025-01-15

## 2025-01-15 PROBLEM — E66.813 CLASS 3 SEVERE OBESITY DUE TO EXCESS CALORIES WITH SERIOUS COMORBIDITY AND BODY MASS INDEX (BMI) OF 40.0 TO 44.9 IN ADULT (HCC): Status: RESOLVED | Noted: 2021-02-24 | Resolved: 2025-01-15

## 2025-01-15 PROBLEM — E11.69 TYPE 2 DIABETES MELLITUS WITH OTHER SPECIFIED COMPLICATION, WITHOUT LONG-TERM CURRENT USE OF INSULIN (HCC): Status: RESOLVED | Noted: 2024-01-31 | Resolved: 2025-01-15

## 2025-01-15 PROCEDURE — 99396 PREV VISIT EST AGE 40-64: CPT | Performed by: INTERNAL MEDICINE

## 2025-01-15 RX ORDER — OXYBUTYNIN CHLORIDE 10 MG/1
10 TABLET, EXTENDED RELEASE ORAL
Qty: 90 TABLET | Refills: 1 | Status: SHIPPED | OUTPATIENT
Start: 2025-01-15 | End: 2025-07-14

## 2025-01-15 RX ORDER — CETIRIZINE HYDROCHLORIDE 10 MG/1
10 TABLET ORAL DAILY
COMMUNITY

## 2025-01-15 RX ORDER — ALBUTEROL SULFATE 90 UG/1
2 INHALANT RESPIRATORY (INHALATION) EVERY 6 HOURS PRN
Qty: 18 G | Refills: 11 | Status: SHIPPED | OUTPATIENT
Start: 2025-01-15

## 2025-01-15 NOTE — ASSESSMENT & PLAN NOTE
Orders:  •  albuterol (ProAir HFA) 90 mcg/act inhaler; Inhale 2 puffs every 6 (six) hours as needed for wheezing or shortness of breath

## 2025-01-15 NOTE — PROGRESS NOTES
Adult Annual Physical  Name: Jing Ortiz      : 1965      MRN: [id number]  Encounter Provider: Cori Willis MD  Encounter Date: 1/15/2025   Encounter department: [facility]    Assessment & Plan  Annual physical exam         Type 2 diabetes mellitus without complication, without long-term current use of insulin (HCC)    Lab Results   Component Value Date    HGBA1C 5.5 2024          Urinary incontinence, unspecified type    Orders:  •  oxybutynin (DITROPAN-XL) 10 MG 24 hr tablet; Take 1 tablet (10 mg total) by mouth daily at bedtime    Mixed hyperlipidemia         Subclinical hypothyroidism         NICKY on CPAP         Mild intermittent asthma without complication    Orders:  •  albuterol (ProAir HFA) 90 mcg/act inhaler; Inhale 2 puffs every 6 (six) hours as needed for wheezing or shortness of breath    Chronic bronchitis, unspecified chronic bronchitis type (HCC)           Immunizations and preventive care screenings were discussed with patient today. Appropriate education was printed on patient's after visit summary.    Counseling:  Exercise: the importance of regular exercise/physical activity was discussed. Recommend exercise 3-5 times per week for at least 30 minutes.       Depression Screening and Follow-up Plan: Patient was screened for depression during today's encounter. They screened negative with a PHQ-2 score of 0.    BMI Counseling: Body mass index is 30.65 kg/m². The BMI is above normal. Nutrition recommendations include reducing portion sizes and decreasing overall calorie intake. Continue Mounjaro and current plan of reduced calories, regular exercise     History of Present Illness     Adult Annual Physical:  Patient presents for annual physical. 60yo female with history of diabetes, hypothyroidism here to establish care.     Diabetes: well-controlled, lost 60 lbs with [place] diet and now on Mounjaro through Endocrine. Last A1c 5.5%. History of fatty  liver. Last US in 2023. Stopped metformin about 2 months.     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 .     Diet and Physical Activity:  - Diet/Nutrition: well balanced diet, portion control and low carb diet.  - Exercise: 3-4 times a week on average, strength training exercises and walking. usually bikes  miles/week    Depression Screening:  - PHQ-2 Score: 0    General Health:  - Sleep: sleeps well. uses CPAP  - Vision: goes for regular eye exams. Dr. Huizar  - Dental: regular dental visits.    /GYN Health:  - Follows with GYN: yes.   - Menopause: postmenopausal.   - Contraception: menopause.      Advanced Care Planning:  - Has an advanced directive?: yes    - Has a durable medical POA?: yes    - ACP document given to patient?: no      Review of Systems   Constitutional:  Negative for appetite change, chills, fatigue, fever and unexpected weight change.   HENT:  Negative for congestion and sore throat.    Respiratory:  Negative for cough, chest tightness and shortness of breath.    Cardiovascular:  Negative for chest pain.   Gastrointestinal:  Negative for abdominal pain, constipation, diarrhea, nausea and vomiting.   Genitourinary:  Positive for urgency. Negative for difficulty urinating, dysuria and hematuria.   Neurological:  Negative for dizziness, light-headedness and headaches.     Current Outpatient Medications on File Prior to Visit   Medication Sig Dispense Refill   • acetaminophen (TYLENOL) 325 mg tablet Take 650 mg by mouth every 6 (six) hours as needed for mild pain     • b complex vitamins tablet Take 1 tablet by mouth daily     • cetirizine (ZyrTEC) 10 mg tablet Take 10 mg by mouth daily     • cholecalciferol (VITAMIN D3) 1,000 units tablet Take 10,000 Units by mouth daily Only on Sundays     • EPINEPHrine (EPIPEN JR) 0.15 mg/0.3 mL SOAJ Inject 0.3 mg into a muscle once     • fluticasone (FLONASE) 50 mcg/act nasal spray 2 sprays into each nostril daily Per nare daily     •  "levothyroxine (Levoxyl) 25 mcg tablet Take 1 tablet (25 mcg total) by mouth daily in the early morning 90 tablet 0   • Tirzepatide (Mounjaro) 5 MG/0.5ML SOAJ Inject 5 mg under the skin once a week 6 mL 0   • [DISCONTINUED] oxybutynin (DITROPAN-XL) 10 MG 24 hr tablet Take 1 tablet (10 mg total) by mouth daily at bedtime 90 tablet 1   • Loratadine 10 MG CAPS Take by mouth daily after breakfast (Patient not taking: Reported on 1/15/2025)     • [DISCONTINUED] Albuterol (PROVENTIL IN) Inhale (Patient not taking: Reported on 8/13/2024)     • [DISCONTINUED] BORON PO Take 1 Capful by mouth in the morning     • [DISCONTINUED] cyclobenzaprine (FLEXERIL) 10 mg tablet Take 1 tablet (10 mg total) by mouth 3 (three) times a day as needed for muscle spasms 60 tablet 0   • [DISCONTINUED] metFORMIN (GLUCOPHAGE-XR) 500 mg 24 hr tablet Take 1 tablet (500 mg total) by mouth daily with breakfast Once a day 90 tablet 0     No current facility-administered medications on file prior to visit.      Social History     Tobacco Use   • Smoking status: Never   • Smokeless tobacco: Never   • Tobacco comments:     Secondhand smoke growing up   Vaping Use   • Vaping status: Never Used   Substance and Sexual Activity   • Alcohol use: Yes     Comment: Three to four per month hard cider.   • Drug use: Never   • Sexual activity: Not Currently     Partners: Male     Birth control/protection: Post-menopausal       Objective   /70   Pulse 78   Temp (!) 97.2 °F (36.2 °C)   Resp 18   Ht 5' 3\" (1.6 m)   Wt 78.5 kg (173 lb)   SpO2 99%   BMI 30.65 kg/m²     Physical Exam  Vitals reviewed.   Constitutional:       General: She is not in acute distress.     Appearance: She is normal weight.   Cardiovascular:      Rate and Rhythm: Normal rate and regular rhythm.      Pulses: no weak pulses.           Dorsalis pedis pulses are 2+ on the right side and 2+ on the left side.        Posterior tibial pulses are 2+ on the right side and 2+ on the left side. "      Heart sounds: No murmur heard.     No friction rub. No gallop.   Pulmonary:      Effort: Pulmonary effort is normal. No respiratory distress.      Breath sounds: No wheezing, rhonchi or rales.   Abdominal:      General: Abdomen is flat. Bowel sounds are normal. There is no distension.      Palpations: Abdomen is soft. There is no mass.      Tenderness: There is no abdominal tenderness. There is no guarding or rebound.   Musculoskeletal:      Right lower leg: No edema.      Left lower leg: No edema.   Feet:      Right foot:      Skin integrity: No ulcer, skin breakdown, erythema, warmth, callus or dry skin.      Left foot:      Skin integrity: No ulcer, skin breakdown, erythema, warmth, callus or dry skin.   Skin:     General: Skin is warm and dry.   Neurological:      General: No focal deficit present.      Mental Status: She is alert.   Psychiatric:         Mood and Affect: Mood normal.         Behavior: Behavior normal.       Diabetic Foot Exam    Patient's shoes and socks removed.    Right Foot/Ankle   Right Foot Inspection  Skin Exam: skin normal and skin intact. No dry skin, no warmth, no callus, no erythema, no maceration, no abnormal color, no pre-ulcer, no ulcer and no callus.     Toe Exam: ROM and strength within normal limits.     Sensory   Proprioception: intact  Monofilament testing: intact    Vascular  Capillary refills: < 3 seconds  The right DP pulse is 2+. The right PT pulse is 2+.     Left Foot/Ankle  Left Foot Inspection  Skin Exam: skin normal and skin intact. No dry skin, no warmth, no erythema, no maceration, normal color, no pre-ulcer, no ulcer and no callus.     Toe Exam: ROM and strength within normal limits.     Sensory   Proprioception: intact  Monofilament testing: intact    Vascular  Capillary refills: < 3 seconds  The left DP pulse is 2+. The left PT pulse is 2+.     Assign Risk Category  No deformity present  No loss of protective sensation  No weak pulses  Risk: 0

## 2025-01-15 NOTE — PATIENT INSTRUCTIONS
"Patient Education     Routine physical for adults   The Basics   Written by the doctors and editors at [facility]   What is a physical? -- A physical is a routine visit, or \"check-up,\" with your doctor. You might also hear it called a \"wellness visit\" or \"preventive visit.\"  During each visit, the doctor will:   Ask about your physical and mental health   Ask about your habits, behaviors, and lifestyle   Do an exam   Give you vaccines if needed   Talk to you about any medicines you take   Give advice about your health   Answer your questions  Getting regular check-ups is an important part of taking care of your health. It can help your doctor find and treat any problems you have. But it's also important for preventing health problems.  A routine physical is different from a \"sick visit.\" A sick visit is when you see a doctor because of a health concern or problem. Since physicals are scheduled ahead of time, you can think about what you want to ask the doctor.  How often should I get a physical? -- It depends on your age and health. In general, for people age 21 years and older:   If you are younger than 50 years, you might be able to get a physical every 3 years.   If you are 50 years or older, your doctor might recommend a physical every year.  If you have an ongoing health condition, like diabetes or high blood pressure, your doctor will probably want to see you more often.  What happens during a physical? -- In general, each visit will include:   Physical exam - The doctor or nurse will check your height, weight, heart rate, and blood pressure. They will also look at your eyes and ears. They will ask about how you are feeling and whether you have any symptoms that bother you.   Medicines - It's a good idea to bring a list of all the medicines you take to each doctor visit. Your doctor will talk to you about your medicines and answer any questions. Tell them if you are having any side effects that bother you. You " "should also tell them if you are having trouble paying for any of your medicines.   Habits and behaviors - This includes:   Your diet   Your exercise habits   Whether you smoke, drink alcohol, or use drugs   Whether you are sexually active   Whether you feel safe at home  Your doctor will talk to you about things you can do to improve your health and lower your risk of health problems. They will also offer help and support. For example, if you want to quit smoking, they can give you advice and might prescribe medicines. If you want to improve your diet or get more physical activity, they can help you with this, too.   Lab tests, if needed - The tests you get will depend on your age and situation. For example, your doctor might want to check your:   Cholesterol   Blood sugar   Iron level   Vaccines - The recommended vaccines will depend on your age, health, and what vaccines you already had. Vaccines are very important because they can prevent certain serious or deadly infections.   Discussion of screening - \"Screening\" means checking for diseases or other health problems before they cause symptoms. Your doctor can recommend screening based on your age, risk, and preferences. This might include tests to check for:   Cancer, such as breast, prostate, cervical, ovarian, colorectal, prostate, lung, or skin cancer   Sexually transmitted infections, such as chlamydia and gonorrhea   Mental health conditions like depression and anxiety  Your doctor will talk to you about the different types of screening tests. They can help you decide which screenings to have. They can also explain what the results might mean.   Answering questions - The physical is a good time to ask the doctor or nurse questions about your health. If needed, they can refer you to other doctors or specialists, too.  Adults older than 65 years often need other care, too. As you get older, your doctor will talk to you about:   How to prevent falling at " home   Hearing or vision tests   Memory testing   How to take your medicines safely   Making sure that you have the help and support you need at home  All topics are updated as new evidence becomes available and our peer review process is complete.  This topic retrieved from [email or web address] on: May 02, 2024.  Topic 049263 Version 1.0  Release: 32.4.3 - C32.122  © 2024 UpToDate, Inc. and/or its affiliates. All rights reserved.  Consumer Information Use and Disclaimer   Disclaimer: This generalized information is a limited summary of diagnosis, treatment, and/or medication information. It is not meant to be comprehensive and should be used as a tool to help the user understand and/or assess potential diagnostic and treatment options. It does NOT include all information about conditions, treatments, medications, side effects, or risks that may apply to a specific patient. It is not intended to be medical advice or a substitute for the medical advice, diagnosis, or treatment of a health care provider based on the health care provider's examination and assessment of a patient's specific and unique circumstances. Patients must speak with a health care provider for complete information about their health, medical questions, and treatment options, including any risks or benefits regarding use of medications. This information does not endorse any treatments or medications as safe, effective, or approved for treating a specific patient. UpToDate, Inc. and its affiliates disclaim any warranty or liability relating to this information or the use thereof.The use of this information is governed by the Terms of Use, available at https://www.woltersMashapeuwer.com/en/know/clinical-effectiveness-terms. 2024© UpToDate, Inc. and its affiliates and/or licensors. All rights reserved.  Copyright   © 2024 UpToDate, Inc. and/or its affiliates. All rights reserved.

## 2025-01-20 ENCOUNTER — TELEPHONE (OUTPATIENT)
Dept: ADMINISTRATIVE | Facility: OTHER | Age: 60
End: 2025-01-20

## 2025-01-20 NOTE — TELEPHONE ENCOUNTER
----- Message from Naman NAVA sent at 1/17/2025  2:19 PM EST -----  Regarding: Care Gap Request  01/17/25 2:19 PM    Hello, our patient Jing Ortiz has had Diabetic Eye Exam completed/performed. Please assist in updating the patient chart by pulling the document from the Media Tab. The date of service is 1/16/25.     Thank you,  Naman Willett MA  [facility]

## 2025-01-23 NOTE — TELEPHONE ENCOUNTER
01/23/25 8:26 AM     Chart reviewed for Diabetic Eye Exam was/were submitted to the patient's insurance.     Renetta Joseph   [facility]

## 2025-01-23 NOTE — TELEPHONE ENCOUNTER
Upon review of the In Basket request we were able to locate, review, and update the patient chart as requested for Diabetic Eye Exam.    Any additional questions or concerns should be emailed to the Practice Liaisons via the appropriate education email address, please do not reply via In Basket.    Thank you  Renetta Joseph   [facility]

## 2025-02-18 DIAGNOSIS — E03.8 OTHER SPECIFIED HYPOTHYROIDISM: ICD-10-CM

## 2025-02-18 DIAGNOSIS — E11.9 TYPE 2 DIABETES MELLITUS WITHOUT COMPLICATION, WITHOUT LONG-TERM CURRENT USE OF INSULIN (HCC): ICD-10-CM

## 2025-02-18 RX ORDER — TIRZEPATIDE 5 MG/.5ML
5 INJECTION, SOLUTION SUBCUTANEOUS WEEKLY
Qty: 2 ML | Refills: 0 | Status: SHIPPED | OUTPATIENT
Start: 2025-02-18 | End: 2025-05-19

## 2025-02-18 RX ORDER — LEVOTHYROXINE SODIUM 25 UG/1
25 TABLET ORAL
Qty: 90 TABLET | Refills: 1 | Status: SHIPPED | OUTPATIENT
Start: 2025-02-18

## 2025-02-26 ENCOUNTER — TELEPHONE (OUTPATIENT)
Age: 60
End: 2025-02-26

## 2025-02-26 DIAGNOSIS — E11.9 TYPE 2 DIABETES MELLITUS WITHOUT COMPLICATION, WITHOUT LONG-TERM CURRENT USE OF INSULIN (HCC): ICD-10-CM

## 2025-02-26 DIAGNOSIS — E11.9 TYPE 2 DIABETES MELLITUS WITHOUT COMPLICATION, WITHOUT LONG-TERM CURRENT USE OF INSULIN (HCC): Primary | ICD-10-CM

## 2025-02-26 RX ORDER — TIRZEPATIDE 5 MG/.5ML
5 INJECTION, SOLUTION SUBCUTANEOUS WEEKLY
Qty: 2 ML | Refills: 0 | Status: SHIPPED | OUTPATIENT
Start: 2025-02-26 | End: 2025-05-27

## 2025-02-26 RX ORDER — TIRZEPATIDE 5 MG/.5ML
5 INJECTION, SOLUTION SUBCUTANEOUS WEEKLY
Qty: 2 ML | Refills: 0 | Status: CANCELLED | OUTPATIENT
Start: 2025-02-26 | End: 2025-05-27

## 2025-02-26 NOTE — TELEPHONE ENCOUNTER
Pt stated mounjaro is out of stock.she wants prescription send to [facility] they have it in stock.         [facility] pharmacy  in [place]

## 2025-02-26 NOTE — TELEPHONE ENCOUNTER
Patient called back stating she is not leaving [facility] without her script today. Unable to call verbal order in to [facility] for her Mounjaro. Message sent to on call provider to send mounjaro 5 mg to [facility] in [place].

## 2025-02-26 NOTE — TELEPHONE ENCOUNTER
Mounjaro 5mg/0.5ml    [facility] [address]    Per above message from [facility] pharmacy out of stock. Patient asking to Please send script to new pharmacy- [facility] [place]. Thank you

## 2025-03-14 DIAGNOSIS — E11.9 TYPE 2 DIABETES MELLITUS WITHOUT COMPLICATION, WITHOUT LONG-TERM CURRENT USE OF INSULIN (HCC): ICD-10-CM

## 2025-03-14 NOTE — TELEPHONE ENCOUNTER
Medication: Tirzepatide (Mounjaro) 5 MG/0.5ML SOAJ     Dose/Frequency: Inject 5mg under the skim once a week    Quantity: requesting 90 day supply     Pharmacy: [facility].     Office:   [] PCP/Provider -   [x] Speciality/Provider - endo    Does the patient have enough for 3 days?   [] Yes   [x] No - Send as HP to POD

## 2025-03-17 DIAGNOSIS — E11.9 TYPE 2 DIABETES MELLITUS WITHOUT COMPLICATION, WITHOUT LONG-TERM CURRENT USE OF INSULIN (HCC): ICD-10-CM

## 2025-03-17 RX ORDER — TIRZEPATIDE 5 MG/.5ML
5 INJECTION, SOLUTION SUBCUTANEOUS WEEKLY
Qty: 2 ML | Refills: 0 | Status: SHIPPED | OUTPATIENT
Start: 2025-03-17 | End: 2025-03-17

## 2025-03-17 RX ORDER — TIRZEPATIDE 5 MG/.5ML
5 INJECTION, SOLUTION SUBCUTANEOUS WEEKLY
Qty: 6 ML | Refills: 0 | Status: SHIPPED | OUTPATIENT
Start: 2025-03-17 | End: 2025-03-19

## 2025-03-19 ENCOUNTER — OFFICE VISIT (OUTPATIENT)
Dept: ENDOCRINOLOGY | Facility: CLINIC | Age: 60
End: 2025-03-19
Payer: COMMERCIAL

## 2025-03-19 VITALS
HEIGHT: 63 IN | OXYGEN SATURATION: 99 % | TEMPERATURE: 98.1 F | BODY MASS INDEX: 30.12 KG/M2 | RESPIRATION RATE: 18 BRPM | WEIGHT: 170 LBS | DIASTOLIC BLOOD PRESSURE: 70 MMHG | HEART RATE: 69 BPM | SYSTOLIC BLOOD PRESSURE: 124 MMHG

## 2025-03-19 DIAGNOSIS — E03.8 SUBCLINICAL HYPOTHYROIDISM: ICD-10-CM

## 2025-03-19 DIAGNOSIS — E11.9 TYPE 2 DIABETES MELLITUS WITHOUT COMPLICATION, WITHOUT LONG-TERM CURRENT USE OF INSULIN (HCC): Primary | ICD-10-CM

## 2025-03-19 DIAGNOSIS — E78.2 MIXED HYPERLIPIDEMIA: ICD-10-CM

## 2025-03-19 PROCEDURE — 99214 OFFICE O/P EST MOD 30 MIN: CPT | Performed by: STUDENT IN AN ORGANIZED HEALTH CARE EDUCATION/TRAINING PROGRAM

## 2025-03-19 RX ORDER — TIRZEPATIDE 5 MG/.5ML
5 INJECTION, SOLUTION SUBCUTANEOUS WEEKLY
Qty: 6 ML | Refills: 1 | Status: SHIPPED | OUTPATIENT
Start: 2025-03-19 | End: 2025-03-25 | Stop reason: SDUPTHER

## 2025-03-19 NOTE — ASSESSMENT & PLAN NOTE
Her thyroid function tests are within normal limits. She will continue her levothyroxine at the same dose of 25 mcg.  Orders:  •  TSH, 3rd generation with Free T4 reflex; Future

## 2025-03-19 NOTE — ASSESSMENT & PLAN NOTE
Her A1c has improved to 5.1%, and she has lost 65 pounds. She is currently on Mounjaro 5 mg once a week. She will continue with the current dose of Mounjaro 5 mg once a week. A prescription refill for Mounjaro has been provided to ensure she does not face issues with obtaining the medication.  Her blood pressure is well-controlled at 124/70 mmHg, with a target goal of less than 130/80 mmHg. She is not currently on any blood pressure medication.    Follow-up  The patient will follow up in 6 months.    Lab Results   Component Value Date    HGBA1C 5.5 07/18/2024     Orders:  •  Tirzepatide (Mounjaro) 5 MG/0.5ML SOAJ; Inject 5 mg under the skin once a week  •  Hemoglobin A1C; Future  •  Comprehensive metabolic panel; Future  •  Lipid Panel with Direct LDL reflex; Future

## 2025-03-19 NOTE — ASSESSMENT & PLAN NOTE
Her LDL is below 100, which is acceptable. She is not on any specific medication for cholesterol.  We will monitor lipid panel over time to determine needs for initiation of statin.

## 2025-03-19 NOTE — PROGRESS NOTES
Name: Jing Ortiz      : 1965      MRN: [id number]  Encounter Provider: Jeff Henderson MD  Encounter Date: 3/19/2025   Encounter department: [facility] [place]    Chief Complaint   Patient presents with   • Follow-up   :  Assessment & Plan  Type 2 diabetes mellitus without complication, without long-term current use of insulin (HCC)    Her A1c has improved to 5.1%, and she has lost 65 pounds. She is currently on Mounjaro 5 mg once a week. She will continue with the current dose of Mounjaro 5 mg once a week. A prescription refill for Mounjaro has been provided to ensure she does not face issues with obtaining the medication.  Her blood pressure is well-controlled at 124/70 mmHg, with a target goal of less than 130/80 mmHg. She is not currently on any blood pressure medication.    Follow-up  The patient will follow up in 6 months.    Lab Results   Component Value Date    HGBA1C 5.5 2024     Orders:  •  Tirzepatide (Mounjaro) 5 MG/0.5ML SOAJ; Inject 5 mg under the skin once a week  •  Hemoglobin A1C; Future  •  Comprehensive metabolic panel; Future  •  Lipid Panel with Direct LDL reflex; Future    Mixed hyperlipidemia  Her LDL is below 100, which is acceptable. She is not on any specific medication for cholesterol.  We will monitor lipid panel over time to determine needs for initiation of statin.       Subclinical hypothyroidism  Her thyroid function tests are within normal limits. She will continue her levothyroxine at the same dose of 25 mcg.  Orders:  •  TSH, 3rd generation with Free T4 reflex; Future        History of Present Illness   History of Present Illness  Jing Ortiz is a 59-year-old female who presents for evaluation of type 2 diabetes without long-term current use of insulin, subclinical hypothyroidism and hyperlipidemia.    She has been managing her diabetes with Mounjaro 5 mg, administered weekly. Previously, she was on metformin, but it was  discontinued due to a decrease in her A1c levels from 5.5 to 5.1. She has also achieved a significant weight loss of 65 pounds. She continues to follow [employer] diet.  She has encountered issues with her pharmacy regarding the duration of her prescription refill, which was initially intended for 3 months but was only filled for 1 month. This discrepancy led to a delay in receiving her medication, with a 2-day gap between the due date and the actual receipt of the medication.   She has been informed that her insurance does not cover the cost of Mounjaro, resulting in out-of-pocket expenses. She is currently exploring options to obtain the medication at a reduced cost through a specialty pharmacy.    She is not currently on any antihypertensive medications.    She is not on any cholesterol-lowering medications.                    Last Eye Exam: 10/04/2024  Last Foot Exam: 01/15/2025  Health Maintenance   Topic Date Due   • Diabetic Foot Exam  01/15/2026   • Diabetic Eye Exam  10/04/2026     Pertinent Medical History           Review of Systems as per HPI    Medical History Reviewed by provider this encounter:     .  Current Outpatient Medications on File Prior to Visit   Medication Sig Dispense Refill   • acetaminophen (TYLENOL) 325 mg tablet Take 650 mg by mouth every 6 (six) hours as needed for mild pain     • albuterol (ProAir HFA) 90 mcg/act inhaler Inhale 2 puffs every 6 (six) hours as needed for wheezing or shortness of breath 18 g 11   • b complex vitamins tablet Take 1 tablet by mouth daily     • cetirizine (ZyrTEC) 10 mg tablet Take 10 mg by mouth daily     • cholecalciferol (VITAMIN D3) 1,000 units tablet Take 10,000 Units by mouth daily Only on Sundays     • EPINEPHrine (EPIPEN JR) 0.15 mg/0.3 mL SOAJ Inject 0.3 mg into a muscle once     • fluticasone (FLONASE) 50 mcg/act nasal spray 2 sprays into each nostril daily Per nare daily     • levothyroxine (Levoxyl) 25 mcg tablet Take 1 tablet (25 mcg total) by  "mouth daily in the early morning 90 tablet 1   • oxybutynin (DITROPAN-XL) 10 MG 24 hr tablet Take 1 tablet (10 mg total) by mouth daily at bedtime 90 tablet 1   • [DISCONTINUED] Tirzepatide (Mounjaro) 5 MG/0.5ML SOAJ Inject 5 mg under the skin once a week 6 mL 0   • Loratadine 10 MG CAPS Take by mouth daily after breakfast (Patient not taking: Reported on 1/15/2025)       No current facility-administered medications on file prior to visit.         Medical History Reviewed by provider this encounter:     .    Objective   /70 (BP Location: Right arm, Patient Position: Sitting, Cuff Size: Adult)   Pulse 69   Temp 98.1 °F (36.7 °C) (Temporal)   Resp 18   Ht 5' 3.25\" (1.607 m)   Wt 77.1 kg (170 lb)   SpO2 99%   BMI 29.88 kg/m²      Body mass index is 29.88 kg/m².  Wt Readings from Last 3 Encounters:   03/19/25 77.1 kg (170 lb)   01/15/25 78.5 kg (173 lb)   09/27/24 92.7 kg (204 lb 6.4 oz)     Physical Exam  Thyroid size is normal.  Lungs are clear.  Heart sounds are normal.    Vital Signs  Blood pressure is 124/70.  Physical Exam  Vitals and nursing note reviewed.   Constitutional:       General: She is not in acute distress.     Appearance: She is well-developed.   HENT:      Head: Normocephalic and atraumatic.   Cardiovascular:      Rate and Rhythm: Normal rate and regular rhythm.   Pulmonary:      Effort: Pulmonary effort is normal. No respiratory distress.   Abdominal:      Palpations: Abdomen is soft.   Musculoskeletal:         General: No swelling.      Cervical back: Neck supple.   Skin:     General: Skin is warm and dry.   Neurological:      Mental Status: She is alert.       Results  Laboratory Studies  Fasting glucose was 84. Creatinine was 0.89. Sodium was 140, potassium was 4.4, calcium 9.8. Albumin was 4.3. Alkaline phosphatase was 44. AST was 20, ALT was 17. Total cholesterol was 168, HDL was 54, triglyceride was 108, LDL was 94. Free T4 was 1.1 ng per dL and T3 was normal at 2.7, none 50 " pg/mL. A1c was 5.1%.  Labs:   Lab Results   Component Value Date    HGBA1C 5.5 07/18/2024    HGBA1C 5.9 (H) 03/13/2024    HGBA1C 8.6 (H) 12/18/2023     Lab Results   Component Value Date    CREATININE 0.86 07/18/2024    CREATININE 0.90 03/13/2024    CREATININE 0.97 12/18/2023    BUN 19 07/18/2024    K 4.2 07/18/2024     07/18/2024    CO2 23 07/18/2024     eGFR   Date Value Ref Range Status   07/18/2024 74 ml/min/1.73sq m Final     Lab Results   Component Value Date    HDL 52 07/18/2024    TRIG 109 07/18/2024     Lab Results   Component Value Date    ALT 19 07/18/2024    AST 21 07/18/2024    ALKPHOS 48 07/18/2024     Lab Results   Component Value Date    VBG3WIPWJXEC 2.321 07/18/2024    HKL6LUWLZSZO 2.354 05/14/2024    DGY5DGLFLLLK 1.587 03/13/2024       There are no Patient Instructions on file for this visit.    Discussed with the patient and all questioned fully answered. She will call me if any problems arise.

## 2025-03-25 DIAGNOSIS — E11.9 TYPE 2 DIABETES MELLITUS WITHOUT COMPLICATION, WITHOUT LONG-TERM CURRENT USE OF INSULIN (HCC): ICD-10-CM

## 2025-03-25 RX ORDER — TIRZEPATIDE 5 MG/.5ML
5 INJECTION, SOLUTION SUBCUTANEOUS WEEKLY
Qty: 6 ML | Refills: 1 | Status: SHIPPED | OUTPATIENT
Start: 2025-03-25 | End: 2025-03-25 | Stop reason: SDUPTHER

## 2025-03-25 RX ORDER — TIRZEPATIDE 5 MG/.5ML
5 INJECTION, SOLUTION SUBCUTANEOUS WEEKLY
Qty: 6 ML | Refills: 1 | Status: SHIPPED | OUTPATIENT
Start: 2025-03-25 | End: 2025-06-23

## 2025-04-09 ENCOUNTER — TELEPHONE (OUTPATIENT)
Dept: ENDOCRINOLOGY | Facility: CLINIC | Age: 60
End: 2025-04-09

## 2025-04-09 DIAGNOSIS — E11.9 TYPE 2 DIABETES MELLITUS WITHOUT COMPLICATION, WITHOUT LONG-TERM CURRENT USE OF INSULIN (HCC): ICD-10-CM

## 2025-04-10 RX ORDER — TIRZEPATIDE 5 MG/.5ML
INJECTION, SOLUTION SUBCUTANEOUS
Qty: 2 ML | Refills: 5 | Status: SHIPPED | OUTPATIENT
Start: 2025-04-10 | End: 2025-04-16 | Stop reason: SDUPTHER

## 2025-04-10 NOTE — TELEPHONE ENCOUNTER
PA for Mounjaro 5MG/0.5ML SUBMITTED to [employer]     via    [x]CMM-KEY: [id number]    [x]PA sent as URGENT    All office notes, labs and other pertaining documents and studies sent. Clinical questions answered. Awaiting determination from insurance company.     Turnaround time for your insurance to make a decision on your Prior Authorization can take 7-21 business days.

## 2025-04-10 NOTE — TELEPHONE ENCOUNTER
PA for Mounjaro 5MG/0.5ML  APPROVED     Date(s) approved Until 03/14/2026     Previously approved

## 2025-04-16 DIAGNOSIS — E11.9 TYPE 2 DIABETES MELLITUS WITHOUT COMPLICATION, WITHOUT LONG-TERM CURRENT USE OF INSULIN (HCC): ICD-10-CM

## 2025-04-16 RX ORDER — TIRZEPATIDE 5 MG/.5ML
5 INJECTION, SOLUTION SUBCUTANEOUS WEEKLY
Qty: 6 ML | Refills: 5 | Status: SHIPPED | OUTPATIENT
Start: 2025-04-16

## 2025-06-06 ENCOUNTER — VBI (OUTPATIENT)
Dept: ADMINISTRATIVE | Facility: OTHER | Age: 60
End: 2025-06-06

## 2025-06-06 NOTE — TELEPHONE ENCOUNTER
06/06/25 11:19 AM     Chart reviewed for Mammogram ; nothing is submitted to the patient's insurance at this time.     Che Shelton MA   [facility]

## 2025-07-07 DIAGNOSIS — R32 URINARY INCONTINENCE, UNSPECIFIED TYPE: ICD-10-CM

## 2025-07-07 RX ORDER — OXYBUTYNIN CHLORIDE 10 MG/1
10 TABLET, EXTENDED RELEASE ORAL
Qty: 90 TABLET | Refills: 1 | Status: SHIPPED | OUTPATIENT
Start: 2025-07-07 | End: 2026-01-03

## 2025-07-07 NOTE — TELEPHONE ENCOUNTER
Patient requesting refill(s) of: Oxybutynin 10 mg     Last filled: 1/15/25  Last appt:1/15/25   Next appt:1/20/26 (needs to schedule w/ available provider)  [facility]

## (undated) DEVICE — 10FR FRAZIER SUCTION HANDLE: Brand: CARDINAL HEALTH

## (undated) DEVICE — TUBING SUCTION 5MM X 12 FT

## (undated) DEVICE — INTENDED FOR TISSUE SEPARATION, AND OTHER PROCEDURES THAT REQUIRE A SHARP SURGICAL BLADE TO PUNCTURE OR CUT.: Brand: BARD-PARKER ® CARBON RIB-BACK BLADES

## (undated) DEVICE — LIGHT HANDLE COVER SLEEVE DISP BLUE STELLAR

## (undated) DEVICE — SUT ETHILON 3-0 FS-1 18 IN 663G

## (undated) DEVICE — STOCKINETTE REGULAR

## (undated) DEVICE — DISPOSABLE EQUIPMENT COVER: Brand: SMALL TOWEL DRAPE

## (undated) DEVICE — GLOVE SRG BIOGEL 7.5

## (undated) DEVICE — STERILE BETHLEHEM PLASTIC HAND: Brand: CARDINAL HEALTH

## (undated) DEVICE — NEEDLE 25G X 1 1/2

## (undated) DEVICE — GLOVE INDICATOR PI UNDERGLOVE SZ 8 BLUE

## (undated) DEVICE — NON-STERILE REUSABLE TOURNIQUET CUFF SINGLE BLADDER, DUAL PORT AND QUICK CONNECT CONNECTOR: Brand: COLOR CUFF

## (undated) DEVICE — U-DRAPE: Brand: CONVERTORS

## (undated) DEVICE — GLOVE SRG BIOGEL 8